# Patient Record
Sex: FEMALE | Race: WHITE | NOT HISPANIC OR LATINO | Employment: UNEMPLOYED | URBAN - METROPOLITAN AREA
[De-identification: names, ages, dates, MRNs, and addresses within clinical notes are randomized per-mention and may not be internally consistent; named-entity substitution may affect disease eponyms.]

---

## 2017-01-01 LAB — NEGATIVE: NORMAL

## 2017-10-15 ENCOUNTER — TRANSFERRED RECORDS (OUTPATIENT)
Dept: HEALTH INFORMATION MANAGEMENT | Facility: CLINIC | Age: 52
End: 2017-10-15

## 2018-03-06 ENCOUNTER — OFFICE VISIT (OUTPATIENT)
Dept: OTHER | Facility: OUTPATIENT CENTER | Age: 53
End: 2018-03-06

## 2018-03-06 DIAGNOSIS — Z51.89 AFTER CARE: Primary | ICD-10-CM

## 2018-03-06 NOTE — MR AVS SNAPSHOT
After Visit Summary   3/6/2018    Jocelin Hernandez    MRN: [id number]           Patient Information     Date Of Birth          1965        Visit Information        Provider Department      3/6/2018 4:00 PM [facility] Sexual [facility]        Today's Diagnoses     After care    -  1       Follow-ups after your visit        Your next 10 appointments already scheduled     Apr 03, 2018  4:00 PM CDT   GROUP with [facility] for [facility] ([facility])    [address]  Mail Code 7521  [facility] [address]   [phone]            Apr 17, 2018  4:00 PM CDT   GROUP with [facility] ([facility])    [address]  Mail Code 7521  [facility] [address]   [phone]            May 01, 2018  4:00 PM CDT   GROUP with [facility] ([facility])    [address]  Mail Code 7521  [facility] [address]   [phone]            May 15, 2018  4:00 PM CDT   GROUP with [facility] ([facility])    [address]  Mail Code 7521  [facility] [address]   [phone]              Who to contact     Please call your clinic at [phone] to:    Ask questions about your health    Make or cancel appointments    Discuss your medicines    Learn about your test results    Speak to your doctor            Additional Information About Your Visit        MyChart Information     BATS is an electronic gateway that provides easy, online access to your medical records. With BATS, you can request a clinic appointment, read your test results, renew a prescription or communicate with your care team.     To sign up for Cloudamizet visit the website at [email or web address]   You will be asked to enter the access code listed below, as well as some personal information. Please follow the directions to create your username  and password.     Your access code is: [email or web address]  Expires: 2018  1:57 PM     Your access code will  in 90 days. If you need help or a new code, please contact your [facility] or call [phone] for assistance.        Care EveryWhere ID     This is your Care EveryWhere ID. This could be used by other organizations to access your [place] medical records  [id number]         Blood Pressure from Last 3 Encounters:   No data found for BP    Weight from Last 3 Encounters:   No data found for Wt              We Performed the Following     Support Group - 120 Min. [90886.539]        Primary Care Provider    None Specified       No primary provider on file.        Equal Access to Services     CHARLES AMOR : Leticia Pradhan, amelia awad, zhen lundberg, soledad serrano . So [facility] [phone].    ATENCIÓN: Si habla español, tiene a lantigua disposición servicios gratuitos de asistencia lingüística. Llame al [phone].    We comply with applicable federal civil rights laws and [place] laws. We do not discriminate on the basis of race, color, national origin, age, disability, sex, sexual orientation, or gender identity.            Thank you!     Thank you for choosing [place] [facility]  for your care. Our goal is always to provide you with excellent care. Hearing back from our patients is one way we can continue to improve our services. Please take a few minutes to complete the written survey that you may receive in the mail after your visit with us. Thank you!             Your Updated Medication List - Protect others around you: Learn how to safely use, store and throw away your medicines at www.disposemymeds.org.      Notice  As of 3/6/2018 11:59 PM    You have not been prescribed any medications.

## 2018-03-20 ENCOUNTER — OFFICE VISIT (OUTPATIENT)
Dept: OTHER | Facility: OUTPATIENT CENTER | Age: 53
End: 2018-03-20

## 2018-03-20 DIAGNOSIS — Z51.89 AFTER CARE: Primary | ICD-10-CM

## 2018-03-20 NOTE — PROGRESS NOTES
Program in Human Sexuality  Center [facility]  [address], [address]  [place]  [address]    Group Progress Note    Date of Service: Mar 6, 2018  Client Name: Jocelin Hernandez  YOB: 1965   MRN: [id number]   Number of Minutes: 120  Therapist(s): Kisha Infante and Cathy Davis, PhD, LP     Current Symptoms/Status:  Distress related to partner's struggles with CSB     Progress Toward Treatment Goals:  Attending first psychoeducational group     Intervention - Modality and Description:  Supportive and Psychoeducational Techniques designed to increase level of support, improve self-esteem and empower client to make decisions that are in their best interest given the current relational dynamics.   Client participated in a ice-breaker activity where able to slowly introduce self to others.  Participated in a discussion of the importance of confidentiality and gave ideas about the way that the group could run so that it would feel emotionally safe.  Client shared aspects of personal narrative that would help the group get to know her struggles being in a relationship with someone who has CSB as well as serve as a shame-reduction mechanism.        Response to Intervention:  Client was engaged throughout the group process.      Assignment:  Materials for 2nd group     Interactive Complexity:  none     Diagnosis:  Z51.89 After care        Plan/Need for Future Services:  Return for group therapy in 2 weeks.     Cathy Davis, PhD LP  /Supervisor

## 2018-03-20 NOTE — MR AVS SNAPSHOT
After Visit Summary   3/20/2018    Jocelin Hernandez    MRN: [id number]           Patient Information     Date Of Birth          1965        Visit Information        Provider Department      3/20/2018 4:00 PM [facility]        Today's Diagnoses     After care    -  1       Follow-ups after your visit        Your next 10 appointments already scheduled     Apr 03, 2018  4:00 PM CDT   GROUP with [facility] for Sexual [facility] ([facility])    [address]  Mail Code 7521  [facility] [address]   [phone]            Apr 17, 2018  4:00 PM CDT   GROUP with [facility] ([facility])    [address]  Mail [address] 7521  [facility] [address]   [phone]            May 01, 2018  4:00 PM CDT   GROUP with [facility] ([facility])    [address]  Mail [address] 7521  [facility] [address]   [phone]            May 15, 2018  4:00 PM CDT   GROUP with [facility] ([facility])    [address]  Mail [address] 7521  [facility] [address]   [phone]              Who to contact     Please call your clinic at [phone] to:    Ask questions about your health    Make or cancel appointments    Discuss your medicines    Learn about your test results    Speak to your doctor            Additional Information About Your Visit        MyChart Information     SmartDocs (Teknowmics)t is an electronic gateway that provides easy, online access to your medical records. With Voxel, you can request a clinic appointment, read your test results, renew a prescription or communicate with your care team.     To sign up for SmartDocs (Teknowmics)t visit the website at [email or web address]   You will be asked to enter the access code listed below, as well as some personal information. Please follow the directions to create your  username and password.     Your access code is: [email or web address]  Expires: 2018  1:57 PM     Your access code will  in 90 days. If you need help or a new code, please contact your [facility] or call [phone] for assistance.        Care EveryWhere ID     This is your Care EveryWhere ID. This could be used by other organizations to access your [place] medical records  [id number]         Blood Pressure from Last 3 Encounters:   No data found for BP    Weight from Last 3 Encounters:   No data found for Wt              We Performed the Following     Support Group - 120 Min. [30188.539]        Primary Care Provider    None Specified       No primary provider on file.        Equal Access to Services     CHARLES AMOR : Leticia Pradhan, amelia awad, zhen lundberg, soledad serrano . So [facility] [phone].    ATENCIÓN: Si habla español, tiene a lantigua disposición servicios gratuitos de asistencia lingüística. Llame al [phone].    We comply with applicable federal civil rights laws and [place] laws. We do not discriminate on the basis of race, color, national origin, age, disability, sex, sexual orientation, or gender identity.            Thank you!     Thank you for choosing [place] [facility]  for your care. Our goal is always to provide you with excellent care. Hearing back from our patients is one way we can continue to improve our services. Please take a few minutes to complete the written survey that you may receive in the mail after your visit with us. Thank you!             Your Updated Medication List - Protect others around you: Learn how to safely use, store and throw away your medicines at www.disposemymeds.org.      Notice  As of 3/20/2018 11:59 PM    You have not been prescribed any medications.

## 2018-03-21 NOTE — PROGRESS NOTES
Program in Human Sexuality  Center for [facility]  [address], [address]  [place]  [address]    Group Progress Note    Date of Service: Mar 6, 2018  Client Name: Jocelin Hernandez  YOB: 1965   MRN: [id number]   Number of Minutes: 120  Therapist(s): Kisha Infante and Cathy Davis, PhD, LP      Current Symptoms/Status:  Distress related to partner's struggles with CSB      Progress Toward Treatment Goals:  Attending 2nd psychoeducational group      Intervention - Modality and Description:  Supportive and Psychoeducational Techniques designed to increase level of support, improve self-esteem and empower client to make decisions that are in their best interest given the current relational dynamics.   Client introduced self to new members who were joining by sharing aspects of personal narrative.  Participated in a discussion of treatment model for CSB at this clinic and how this is similar and different from other types of treatment (e.g. sex addiction models of recovery).       Response to Intervention:  Client was engaged throughout the group process.       Assignment:  Reflect on what she knows about partner's CSB treatment and how she fits into it.       Interactive Complexity:  none      Diagnosis:  Z51.89 After care          Plan/Need for Future Services:  Return for group therapy in 2 weeks.      Cathy Davis, PhD LP  /Supervisor

## 2018-04-03 ENCOUNTER — OFFICE VISIT (OUTPATIENT)
Dept: OTHER | Facility: OUTPATIENT CENTER | Age: 53
End: 2018-04-03

## 2018-04-03 DIAGNOSIS — Z51.89 AFTER CARE: Primary | ICD-10-CM

## 2018-04-03 NOTE — MR AVS SNAPSHOT
After Visit Summary   4/3/2018    Jocelin Hernandez    MRN: [id number]           Patient Information     Date Of Birth          1965        Visit Information        Provider Department      4/3/2018 4:00 PM [facility]        Today's Diagnoses     After care    -  1       Follow-ups after your visit        Your next 10 appointments already scheduled     May 01, 2018  4:00 PM CDT   GROUP with [facility] Health ([facility])    [address]  Mail [address] 7521  [facility] [address]   [phone]            May 15, 2018  4:00 PM CDT   GROUP with [facility] ([facility])    [address]  Mail Code 7521  [facility] [address]   [phone]              Who to contact     Please call your clinic at [phone] to:    Ask questions about your health    Make or cancel appointments    Discuss your medicines    Learn about your test results    Speak to your doctor            Additional Information About Your Visit        MyChart Information     [employer] is an electronic gateway that provides easy, online access to your medical records. With [employer] Medical, you can request a clinic appointment, read your test results, renew a prescription or communicate with your care team.     To sign up for [employer] Medical visit the website at [email or web address]   You will be asked to enter the access code listed below, as well as some personal information. Please follow the directions to create your username and password.     Your access code is: [email or web address]  Expires: 2018  1:57 PM     Your access code will  in 90 days. If you need help or a new code, please contact your [facility] or call [phone] for assistance.        Care EveryWhere ID     This is your Care EveryWhere ID. This could be used by other organizations to access your [facility]  records  [id number]         Blood Pressure from Last 3 Encounters:   No data found for BP    Weight from Last 3 Encounters:   No data found for Wt              We Performed the Following     Group Therapy [68232]        Primary Care Provider    None Specified       No primary provider on file.        Equal Access to Services     CHARLES AMOR : Hadii georgia mitchell darlin Soerasmo, washadiada luqadaha, redta kaalmada ademonique, soledad harmeet myeshaainsley menesesyaquelin cannon maggie . So [facility] [phone].    ATENCIÓN: Si habla español, tiene a lantigua disposición servicios gratuitos de asistencia lingüística. Llame al [phone].    We comply with applicable federal civil rights laws and [place] laws. We do not discriminate on the basis of race, color, national origin, age, disability, sex, sexual orientation, or gender identity.            Thank you!     Thank you for choosing [place] [facility]  for your care. Our goal is always to provide you with excellent care. Hearing back from our patients is one way we can continue to improve our services. Please take a few minutes to complete the written survey that you may receive in the mail after your visit with us. Thank you!             Your Updated Medication List - Protect others around you: Learn how to safely use, store and throw away your medicines at www.disposemymeds.org.      Notice  As of 4/3/2018 11:59 PM    You have not been prescribed any medications.

## 2018-04-16 NOTE — PROGRESS NOTES
Program in Human Sexuality  [facility]  [address], [address]  [place]  [address]    Group Progress Note    Date of Service: 4/03/18  Client Name: Jocelin Hernandez  YOB: 1965   MRN: [id number]   Number of Minutes: 120  Therapist(s): Amanda Pablo PsyD LMSANTIAGO and Cathy Davis, PhD, LP      Current Symptoms/Status:  Distress related to partner's struggles with CSB      Progress Toward Treatment Goals:  Attending 3rd psychoeducational group      Intervention - Modality and Description:  Supportive and Psychoeducational Techniques designed to increase level of support, improve self-esteem and empower client to make decisions that are in their best interest given the current relational dynamics.   Client checked in and shared questions about treatment model.  Participated in a discussion of treatment model for CSB, and conversation about boundaries.     Response to Intervention:  Client was engaged throughout the group process.       Assignment:  Reflect on what kinds of boundaries she would like to set for herself.       Interactive Complexity:  none      Diagnosis:  Z51.89 After care          Plan/Need for Future Services:  Return for group therapy in 2 weeks.      Amanda Pablo PsyD  Postdoctoral Fellow

## 2018-04-17 ENCOUNTER — TELEPHONE (OUTPATIENT)
Dept: OTHER | Facility: OUTPATIENT CENTER | Age: 53
End: 2018-04-17

## 2018-04-17 NOTE — PROGRESS NOTES
I was present for the entire group therapy session and actively participated in the session.  Cathy Davis, Ph.D.   Licensed Psychologist

## 2018-05-15 ENCOUNTER — HEALTH MAINTENANCE LETTER (OUTPATIENT)
Age: 53
End: 2018-05-15

## 2018-05-15 ENCOUNTER — OFFICE VISIT (OUTPATIENT)
Dept: OTHER | Facility: OUTPATIENT CENTER | Age: 53
End: 2018-05-15

## 2018-05-15 DIAGNOSIS — Z51.89 AFTER CARE: Primary | ICD-10-CM

## 2018-05-15 NOTE — MR AVS SNAPSHOT
After Visit Summary   5/15/2018    Jocelin Hernandez    MRN: [id number]           Patient Information     Date Of Birth          1965        Visit Information        Provider Department      5/15/2018 4:00 PM [facility]        Today's Diagnoses     After care    -  1       Follow-ups after your visit        Your next 10 appointments already scheduled     May 25, 2018 12:00 PM CDT   Return Walk In Ortho with Efrain Rooney MD   [facility] ([facility])    [address]  4th Floor  [facility] [address] [phone]            Aug 10, 2018  1:00 PM CDT   New Patient Visit with Nola Nielson MD   [facility] ([facility])    [address] [address]   [phone]              Who to contact     Please call your clinic at [phone] to:    Ask questions about your health    Make or cancel appointments    Discuss your medicines    Learn about your test results    Speak to your doctor            Additional Information About Your Visit        AVA [email or web address].[email or web address] Information     Context Matters is an electronic gateway that provides easy, online access to your medical records. With Context Matters, you can request a clinic appointment, read your test results, renew a prescription or communicate with your care team.     To sign up for Context Matters visit the website at [email or web address]   You will be asked to enter the access code listed below, as well as some personal information. Please follow the directions to create your username and password.     Your access code is: [email or web address]  Expires: 2018  1:57 PM     Your access code will  in 90 days. If you need help or a new code, please contact your [facility] or call [phone] for assistance.        Care EveryWhere ID     This is your Care EveryWhere  ID. This could be used by other organizations to access your [place] medical records  [id number]         Blood Pressure from Last 3 Encounters:   05/21/18 138/84    Weight from Last 3 Encounters:   05/21/18 77.6 kg (171 lb)              We Performed the Following     Support Group - 120 Min. [80997.539]        Primary Care Provider Office Phone # Fax #    Nola Nielson [phone]       [address] [place] [address]        Equal Access to Services     CHARLES [facility] : Hadii aad ku hadasho Soomaali, waaxda luqadaha, qaybta kaalmada adeegyada, waxay idiin hayaan adeyaquelin serrano . So [facility] [phone].    ATENCIÓN: Si habla español, tiene a lantigua disposición servicios gratuitos de asistencia lingüística. Musa al [phone].    We comply with applicable federal civil rights laws and [place] laws. We do not discriminate on the basis of race, color, national origin, age, disability, sex, sexual orientation, or gender identity.            Thank you!     Thank you for choosing [place] [facility]  for your care. Our goal is always to provide you with excellent care. Hearing back from our patients is one way we can continue to improve our services. Please take a few minutes to complete the written survey that you may receive in the mail after your visit with us. Thank you!             Your Updated Medication List - Protect others around you: Learn how to safely use, store and throw away your medicines at www.disposemymeds.org.      Notice  As of 5/15/2018 11:59 PM    You have not been prescribed any medications.

## 2018-05-21 ENCOUNTER — OFFICE VISIT (OUTPATIENT)
Dept: ORTHOPEDICS | Facility: CLINIC | Age: 53
End: 2018-05-21
Payer: COMMERCIAL

## 2018-05-21 VITALS
DIASTOLIC BLOOD PRESSURE: 84 MMHG | HEART RATE: 75 BPM | SYSTOLIC BLOOD PRESSURE: 138 MMHG | HEIGHT: 65 IN | BODY MASS INDEX: 28.49 KG/M2 | WEIGHT: 171 LBS

## 2018-05-21 DIAGNOSIS — M25.561 ACUTE PAIN OF RIGHT KNEE: Primary | ICD-10-CM

## 2018-05-21 NOTE — PROGRESS NOTES
"[facility] Walk-in Clinic Note      Patient is a 53 year old female who presents to the office today for: Left knee pain  Roughly 6 months ago the patient began having pain in the medial left knee.  No specific injury or trauma but it occurred while walking.  Was seen and evaluated at [facility] and ultimately diagnosed with meniscus tear.  Underwent arthroscopic surgery, but has had persistent pain in the medial knee since that time.  Also has persistent effusion as well as popliteal cyst.  Has tried physical therapy briefly at [facility] but was unhappy with the treatment and found it difficult to participate because of pain and swelling.  Currently she continues to have swelling.  Pain is worse with deep flexion, stairs, kneeling.  Also has aching pain with walking.  Pain localizes medially and and posterior knee.    Denies weakness, numbness, tingling, clicking, locking, or catching.      Past Medical History, Current Medications, and Allergies are reviewed in the electronic medical record as appropriate.     ROS: Pertinent items are noted in HPI.  Constitutional: negative for fevers, chills and malaise  Cardiovascular: negative for dyspnea, fatigue, lower extremity edema  Integument/breast: negative for rash, skin lesion(s) and skin color change  Neurological: negative for paresthesia and weakness      EXAM:/84  Pulse 75  Ht 5' 5.25\" (1.657 m)  Wt 171 lb (77.6 kg)  BMI 28.24 kg/m2    Patient is alert, No acute distress, pleasant and conversational.    Gait: nonantalgic. Normal heel toe gait.    Patient is able to perform two legged squat with medial left knee at roughly 75  flexion.    left knee:   Skin intact. No erythema or ecchymosis.  Mild to moderate effusion.  No soft tissue swelling.  Popliteal cyst present    AROM: Zero to approximately 135  without restriction but pain on forced full flexion    Palpation:   TTP over medial joint line  No medial or lateral facet joint tenderness.  No " lateral joint line tenderness     Special Tests:  Negative bounce test, + Roldan's.  No ligamentous laxity or pain with valgus or varus stress.  Negative Lachman's, Anterior Drawer and Posterior Drawer     Full Isometric quad strength, extensor mechanism in place     Neurovascularly intact in the lower extremity    Hip and Ankle with full AROM and nontender      Imaging:   No previous imaging available for review.  However,  radiology reports are available and reviewed.  MRI left knee dated 10/15/17:  IMPRESSION:   1. Large complex primarily horizontal tear throughout the medial meniscus.  2. Mild tearing of the anterior horn of the lateral meniscus with adjacent 2.5 cm parameniscal cyst.   3. Full-thickness chondral fissure in the medial patellar facet.  4. Small joint effusion. Moderate popliteal cyst.    X-ray left knee dated 10/12/2017:  Left knee, 3 views.   Indication:  Left knee pain.  Assessment:  No obvious fracture, subluxation or dislocation noted.     Assessment: Patient is a 53 year old female with persistent left knee pain after arthroscopy.  While her pain continues to localize medially, she may be having persistent effusion from chondromalacia seen on MRI as well.  No mechanical symptoms at the current time.    Recommendations:   Reviewed imaging and previous records with the patient in detail.  Discussed options at this point and patient would like to pursue therapeutic aspiration as well as corticosteroid injection.  She will make an appointment to follow-up and have this done.  Also plan to pursue dedicated course of physical therapy.  If ineffective, will consider repeat MRI and/or surgical consultation.    Efrain Rooney MD

## 2018-05-21 NOTE — MR AVS SNAPSHOT
After Visit Summary   2018    Jocelin Hernandez    MRN: [id number]           Patient Information     Date Of Birth          1965        Visit Information        Provider Department      2018 12:00 PM Efrain Rooney MD  [facility]        Today's Diagnoses     Acute pain of right knee    -  1       Follow-ups after your visit        Additional Services     RADHA PT, HAND, AND CHIROPRACTIC REFERRAL       Physical Therapy Referral                  Your next 10 appointments already scheduled     May 25, 2018 12:00 PM CDT   Return Walk In Ortho with MD FLAKO Chowdary [facility] ([facility] [place])    [address]  4th Floor  [facility] [id number] [phone]            Aug 10, 2018  1:00 PM CDT   New Patient Visit with Nola Nielson MD   [facility] ([facility])    [address], [facility] [address]  [facility] [address]   [phone]              Who to contact     Please call your clinic at [phone] to:    Ask questions about your health    Make or cancel appointments    Discuss your medicines    Learn about your test results    Speak to your doctor            Additional Information About Your Visit        MyChart Information     Plurchasehart is an electronic gateway that provides easy, online access to your medical records. With [employer], you can request a clinic appointment, read your test results, renew a prescription or communicate with your care team.     To sign up for [email or web address] visit the website at [email or web address]   You will be asked to enter the access code listed below, as well as some personal information. Please follow the directions to create your username and password.     Your access code is: [email or web address]  Expires: 2018  1:57 PM     Your access code will  in 90 days. If you need help or a new code,  "please contact your [facility] or call [phone] for assistance.        Care EveryWhere ID     This is your Care EveryWhere ID. This could be used by other organizations to access your [facility] medical records  [id number]        Your Vitals Were     Pulse Height BMI (Body Mass Index)             75 5' 5.25\" (1.657 m) 28.24 kg/m2          Blood Pressure from Last 3 Encounters:   05/21/18 138/84    Weight from Last 3 Encounters:   05/21/18 171 lb (77.6 kg)              We Performed the Following     RADHA PT, HAND, AND CHIROPRACTIC REFERRAL        Primary Care Provider Office Phone # Fax #    Nola Nielson [phone]       [address] [place] [address]        Equal Access to Services     [facility] : Hadii georgia mitchell hadasho Soomaali, waaxda luqadaha, qaybta kaalmada adeegyada, soledad ga haycathy serrano . So [facility] [place] [facility] [phone].    ATENCIÓN: Si habla español, tiene a lantigua disposición servicios gratuitos de asistencia lingüística. Musa al [phone].    We comply with applicable federal civil rights laws and [place] laws. We do not discriminate on the basis of race, color, national origin, age, disability, sex, sexual orientation, or gender identity.            Thank you!     Thank you for choosing [facility] CLINIC  for your care. Our goal is always to provide you with excellent care. Hearing back from our patients is one way we can continue to improve our services. Please take a few minutes to complete the written survey that you may receive in the mail after your visit with us. Thank you!             Your Updated Medication List - Protect others around you: Learn how to safely use, store and throw away your medicines at www.disposemymeds.org.          This list is accurate as of 5/21/18 11:59 PM.  Always use your most recent med list.                   Brand Name Dispense Instructions for use Diagnosis    " acetaminophen 500 MG tablet    TYLENOL     Take 1,000 mg by mouth        albuterol 108 (90 Base) MCG/ACT Inhaler    PROAIR HFA/PROVENTIL HFA/VENTOLIN HFA     Inhale 2 puffs into the lungs        atenolol 25 MG tablet    TENORMIN     Take 25 mg by mouth        buPROPion 150 MG 24 hr tablet    WELLBUTRIN XL     TK 1 T PO QAM

## 2018-05-22 PROBLEM — L71.9 ROSACEA: Status: ACTIVE | Noted: 2018-05-22

## 2018-05-22 PROBLEM — I10 HTN (HYPERTENSION): Status: ACTIVE | Noted: 2018-05-22

## 2018-05-22 RX ORDER — ALBUTEROL SULFATE 90 UG/1
2 AEROSOL, METERED RESPIRATORY (INHALATION)
COMMUNITY
Start: 2018-04-22 | End: 2018-09-12

## 2018-05-22 RX ORDER — BUPROPION HYDROCHLORIDE 150 MG/1
TABLET ORAL
COMMUNITY
Start: 2017-08-24 | End: 2018-09-12

## 2018-05-22 RX ORDER — ATENOLOL 25 MG/1
25 TABLET ORAL
COMMUNITY
Start: 2018-04-23 | End: 2018-09-12

## 2018-05-22 RX ORDER — ACETAMINOPHEN 500 MG
1000 TABLET ORAL
COMMUNITY

## 2018-05-25 ENCOUNTER — OFFICE VISIT (OUTPATIENT)
Dept: ORTHOPEDICS | Facility: CLINIC | Age: 53
End: 2018-05-25
Payer: COMMERCIAL

## 2018-05-25 VITALS — WEIGHT: 171 LBS | BODY MASS INDEX: 28.49 KG/M2 | HEIGHT: 65 IN | HEART RATE: 78 BPM

## 2018-05-25 DIAGNOSIS — G89.29 CHRONIC PAIN OF LEFT KNEE: Primary | ICD-10-CM

## 2018-05-25 DIAGNOSIS — M25.562 CHRONIC PAIN OF LEFT KNEE: Primary | ICD-10-CM

## 2018-05-25 NOTE — PROGRESS NOTES
Program in [facility]  [facility]  [address], [address]  [place]  [address]    Group Progress Note    Date of Service: 5/15/18  Client Name: Jocelin Hernandez  YOB: 1965   MRN: [id number]   Number of Minutes: 120  Therapist(s): Kisha Infante (absent due to illness) and Cathy Davis, PhD, LP     Current Symptoms/Status:  Distress related to partner's struggles with CSB     Progress Toward Treatment Goals:  Attending psychoeducational group     Intervention - Modality and Description:  Supportive and Psychoeducational Techniques designed to increase level of support, improve self-esteem and empower client to make decisions that are in their best interest given the current relational dynamics.   Client participated in a review of boundary setting and the implications of setting boundaries in group member's different R dynamics.  Strongly recommended that client put boundaries in place that are doable when feeling unsafe.  Began discussion of own willingness to be vulnerable and increase levels of intimacy across Rs including with spouse.      Response to Intervention:  Client was engaged throughout the group process.      Assignment:  Review question of willingness to be vulnerable.      Interactive Complexity:  none     Diagnosis:  Z51.89 After care        Plan/Need for Future Services:  Return for group in 2 weeks.      Cathy Davis, PhD LP

## 2018-05-25 NOTE — MR AVS SNAPSHOT
"              After Visit Summary   5/25/2018    Jocelin Hernandez    MRN: [id number]           Patient Information     Date Of Birth          1965        Visit Information        Provider Department      5/25/2018 12:00 PM Efrain Rooney MD [facility]        Today's Diagnoses     Chronic pain of left knee    -  1       Follow-ups after your visit        Your next 10 appointments already scheduled     Jun 05, 2018  4:00 PM CDT   GROUP with [facility]   [facility] ([facility])    [address]  [address]  [facility] [address]   [phone]            Aug 10, 2018  1:00 PM CDT   New Patient Visit with Nola Nielson MD   [facility] ([facility])    [facility]  [address]  [facility] [address]   [phone]              Who to contact     Please call your clinic at [phone] to:    Ask questions about your health    Make or cancel appointments    Discuss your medicines    Learn about your test results    Speak to your doctor            Additional Information About Your Visit        Care EveryWhere ID     This is your Care EveryWhere ID. This could be used by other organizations to access your [facility] medical records  [id number]        Your Vitals Were     Pulse Height BMI (Body Mass Index)             78 5' 5\" (1.651 m) 28.46 kg/m2          Blood Pressure from Last 3 Encounters:   05/21/18 138/84    Weight from Last 3 Encounters:   05/25/18 171 lb (77.6 kg)   05/21/18 171 lb (77.6 kg)              We Performed the Following     DRAIN/INJECT LARGE JOINT/BURSA        Primary Care Provider Office Phone # Fax #    Nola Nielson [phone]       [address]  [facility] [address]        Equal Access to Services     CHARLES AMOR AH: Leticia Pradhan, amelia awad, zhen lundberg, soledad cannon " lanestor bowers. So [facility] [phone].    ATENCIÓN: Si habla bety, tiene a lantgiua disposición servicios gratuitos de asistencia lingüística. Musa al [phone].    We comply with applicable federal civil rights laws and [place] laws. We do not discriminate on the basis of race, color, national origin, age, disability, sex, sexual orientation, or gender identity.            Thank you!     Thank you for choosing [facility] OR[facility] CLINIC  for your care. Our goal is always to provide you with excellent care. Hearing back from our patients is one way we can continue to improve our services. Please take a few minutes to complete the written survey that you may receive in the mail after your visit with us. Thank you!             Your Updated Medication List - Protect others around you: Learn how to safely use, store and throw away your medicines at www.disposemymeds.org.          This list is accurate as of 5/25/18 11:59 PM.  Always use your most recent med list.                   Brand Name Dispense Instructions for use Diagnosis    acetaminophen 500 MG tablet    TYLENOL     Take 1,000 mg by mouth        albuterol 108 (90 Base) MCG/ACT Inhaler    PROAIR HFA/PROVENTIL HFA/VENTOLIN HFA     Inhale 2 puffs into the lungs        atenolol 25 MG tablet    TENORMIN     Take 25 mg by mouth        buPROPion 150 MG 24 hr tablet    WELLBUTRIN XL     TK 1 T PO QAM

## 2018-05-25 NOTE — NURSING NOTE
[address] [address]  Dept: [phone]  ______________________________________________________________________________    Patient: Jocelin Hernandez   : 1965   MRN: [id number]   May 25, 2018    INVASIVE PROCEDURE SAFETY CHECKLIST    Date: 18   Procedure: Left knee CSI and aspiration   Patient Name: Jocelin Hernandez  MRN: [id number]  YOB: 1965    Action: Complete sections as appropriate. Any discrepancy results in a HARD COPY until resolved.     PRE PROCEDURE:  Patient ID verified with 2 identifiers (name and  or MRN): Yes  Procedure and site verified with patient/designee (when able): Yes  Accurate consent documentation in medical record: Yes  H&P (or appropriate assessment) documented in medical record: Yes  H&P must be up to 20 days prior to procedure and updates within 24 hours of procedure as applicable: Yes  Relevant diagnostic and radiology test results appropriately labeled and displayed as applicable: Yes  Procedure site(s) marked with provider initials: Yes    TIMEOUT:  Time-Out performed immediately prior to starting procedure, including verbal and active participation of all team members addressing the following:Yes  * Correct patient identify  * Confirmed that the correct side and site are marked  * An accurate procedure consent form  * Agreement on the procedure to be done  * Correct patient position  * Relevant images and results are properly labeled and appropriately displayed  * The need to administer antibiotics or fluids for irrigation purposes during the procedure as applicable   * Safety precautions based on patient history or medication use    DURING PROCEDURE: Verification of correct person, site, and procedures any time the responsibility for care of the patient is transferred to another member of the care team.     The following medication was given:     MEDICATION:  Kenalog 40 mg  ROUTE: IA  SITE:  Left knee  DOSE: 1mL  LOT #: [id number]  : Keegy  EXPIRATION DATE: 01/2020  NDC#: [address]-7   Was there drug waste? No    MEDICATION:  Lidocaine without epinephrine  ROUTE: IA  SITE: Left knee  DOSE: 9mL  LOT #: [id number] (2vials)  : flaveit  EXPIRATION DATE: 02/2022  NDC#: 10450-455-12   Was there drug waste? Yes  Amount of drug waste (mL): 1.  Reason for waste:  Single use vial    Christen Lim, ATC  May 25, 2018

## 2018-05-25 NOTE — PROGRESS NOTES
SPORTS & ORTHOPEDIC WALK-IN FOLLOW-UP VISIT 5/25/2018    Interval History:     Follow up reason: L knee aspiration/CSI    Date of injury: no injury, pain since knee scope 6 months ago    Date last seen: 5/21/18    Following Therapeutic Plan: Yes     Pain: Unchanged    Function: Unchanged    Interval History: NA     Medical History:    Any recent changes to your medical history? No    Any new medication prescribed since last visit? No

## 2018-05-29 ENCOUNTER — OFFICE VISIT (OUTPATIENT)
Dept: OTHER | Facility: OUTPATIENT CENTER | Age: 53
End: 2018-05-29

## 2018-05-29 DIAGNOSIS — Z51.89 AFTER CARE: Primary | ICD-10-CM

## 2018-05-29 NOTE — MR AVS SNAPSHOT
After Visit Summary   5/29/2018    Jocelin Hernandez    MRN: [id number]           Patient Information     Date Of Birth          1965        Visit Information        Provider Department      5/29/2018 4:00 PM [facility] Sexual Health        Today's Diagnoses     After care    -  1       Follow-ups after your visit        Your next 10 appointments already scheduled     Jun 28, 2018  8:20 AM CDT   RADHA Extremity with Jocelin Simmons PT   [facility] Physical Therapy RADHA ([facility])    [address] [place] [address] [address] [facility] [address] [phone]            Jul 17, 2018  4:00 PM CDT   GROUP with [facility] Sexual Health ([facility])    [address]  [address] 7521  [facility] [address]   [phone]            Aug 10, 2018  1:00 PM CDT   New Patient Visit with Nola Nielson MD   [facility] ([facility])    [facility]  [address]  [facility] [address]   [phone]            Aug 21, 2018  4:00 PM CDT   GROUP with [facility] Sexual Health ([facility])    [address]  [address]  [facility] [address]   [phone]              Who to contact     Please call your clinic at [phone] to:    Ask questions about your health    Make or cancel appointments    Discuss your medicines    Learn about your test results    Speak to your doctor            Additional Information About Your Visit        Care EveryWhere ID     This is your Care EveryWhere ID. This could be used by other organizations to access your [facility] medical records  [id number]         Blood Pressure from Last 3 Encounters:   05/21/18 138/84    Weight from Last 3 Encounters:   05/25/18 77.6 kg (171 lb)   05/21/18 77.6 kg (171 lb)              We Performed the Following     Support Group - 120 Min. [[id number]]        Primary Care Provider  Office Phone # Fax #    Nola Nielson [phone]       [address] [facility] [address]  [facility] [address]        Equal Access to Services     CHARLES AMOR : Hadii aad ku hadvanialisa Abbiali, washadiada luqadaha, qaiggyta kalara lundberg, soledad quiles gideonyaquelin cannon maggie bowers. So [facility] [phone].    ATENCIÓN: Si habla español, tiene a lantigua disposición servicios gratuitos de asistencia lingüística. Llame al [phone].    We comply with applicable federal civil rights laws and [place] laws. We do not discriminate on the basis of race, color, national origin, age, disability, sex, sexual orientation, or gender identity.            Thank you!     Thank you for choosing [place] [facility]  for your care. Our goal is always to provide you with excellent care. Hearing back from our patients is one way we can continue to improve our services. Please take a few minutes to complete the written survey that you may receive in the mail after your visit with us. Thank you!             Your Updated Medication List - Protect others around you: Learn how to safely use, store and throw away your medicines at www.disposemymeds.org.          This list is accurate as of 5/29/18 11:59 PM.  Always use your most recent med list.                   Brand Name Dispense Instructions for use Diagnosis    acetaminophen 500 MG tablet    TYLENOL     Take 1,000 mg by mouth        albuterol 108 (90 Base) MCG/ACT Inhaler    PROAIR HFA/PROVENTIL HFA/VENTOLIN HFA     Inhale 2 puffs into the lungs        atenolol 25 MG tablet    TENORMIN     Take 25 mg by mouth        buPROPion 150 MG 24 hr tablet    WELLBUTRIN XL     TK 1 T PO QAM

## 2018-05-30 NOTE — PROGRESS NOTES
"[facility] Sports and Orthopedic Walk-in Clinic Note      Patient is a 53 year old female who presents to the office today for left knee aspiration and injection. Seen previously on  5/21/18 and discussed steroid injection and physical therapy because of persistent pain postoperatively from menisectomy 6 months ago.     ROS: Pertinent items are noted in HPI.        EXAM:Pulse 78  Ht 5' 5\" (1.651 m)  Wt 171 lb (77.6 kg)  BMI 28.46 kg/m2    General: alert, pleasant, no distress    Imaging: none new     Assessment: Patient is a 53 year old female with persistent knee pain and stiffness following menisectomy.     Recommendations:   Steroid injection given, see note below.   Recommended course of physical therapy  If no improvement after 6 weeks, will consider repeat MRI and/or follow up with surgeon.     PROCEDURE: left knee aspiration/injection   The patient was apprised of the risks and the benefits of the procedure written consent was signed by the patient.   Landmarks were located on the anterior knee, superior lateral to the patella and the area was marked and cleaned with chlorhexadine swab.   Using no touch technique, the skin was anesthetized with 3mL 1% lidocaine. A 20g needle was introduced easily into the joint space resulting in the aspiration of 30 ML clear yellowish fluid. The using sterile technique, the syringe was removed and exchanged. 40 mg of triamcinolone along with 4 mL of 1% lidocaine  was injected easily into the joint space.   There were no complications. The patient tolerated the procedure well. There was minimal bleeding.   The patient was instructed to ice the knee upon leaving clinic and refrain from overuse over the next 2 days.   The patient was instructed to go to the emergency room with any unusual pain, swelling, or redness occurred in the injected area.           Efrain Rooney MD          "

## 2018-06-01 ENCOUNTER — TRANSFERRED RECORDS (OUTPATIENT)
Dept: HEALTH INFORMATION MANAGEMENT | Facility: CLINIC | Age: 53
End: 2018-06-01

## 2018-06-01 ENCOUNTER — MEDICAL CORRESPONDENCE (OUTPATIENT)
Dept: HEALTH INFORMATION MANAGEMENT | Facility: CLINIC | Age: 53
End: 2018-06-01

## 2018-06-01 LAB — PAP SMEAR - HIM PATIENT REPORTED: NEGATIVE

## 2018-06-03 ENCOUNTER — MEDICAL CORRESPONDENCE (OUTPATIENT)
Dept: HEALTH INFORMATION MANAGEMENT | Facility: CLINIC | Age: 53
End: 2018-06-03

## 2018-06-05 ENCOUNTER — OFFICE VISIT (OUTPATIENT)
Dept: OTHER | Facility: OUTPATIENT CENTER | Age: 53
End: 2018-06-05

## 2018-06-05 DIAGNOSIS — Z51.89 AFTER CARE: Primary | ICD-10-CM

## 2018-06-05 NOTE — MR AVS SNAPSHOT
After Visit Summary   6/5/2018    Jocelin Hernandez    MRN: [id number]           Patient Information     Date Of Birth          1965        Visit Information        Provider Department      6/5/2018 4:00 PM [facility] Sexual Health        Today's Diagnoses     After care    -  1       Follow-ups after your visit        Your next 10 appointments already scheduled     Jun 19, 2018  4:00 PM CDT   GROUP with [facility] Sexual Health ([facility])    [address]  [address]  [facility] [address]   [phone]            Jun 28, 2018  8:20 AM CDT   RADHA Extremity with Jocelin Simmons PT   [facility] Physical Therapy RAD[facility] ([facility])    [address] [place] [address] [address] [facility] [address] [phone]            Jul 17, 2018  4:00 PM CDT   GROUP with [facility] Sexual Health ([facility])    [address]  [address]  [facility] [address]   [phone]            Aug 10, 2018  1:00 PM CDT   New Patient Visit with Nola Nielson MD   [facility] ([facility])    [facility]  [address]  [facility] [address]   [phone]            Aug 21, 2018  4:00 PM CDT   GROUP with [facility] Sexual Health ([facility])    [address]  [address]  [facility] [address]   [phone]              Who to contact     Please call your clinic at [phone] to:    Ask questions about your health    Make or cancel appointments    Discuss your medicines    Learn about your test results    Speak to your doctor            Additional Information About Your Visit        Care EveryWhere ID     This is your Care EveryWhere ID. This could be used by other organizations to access your [facility] medical records  [id number]         Blood Pressure from Last 3 Encounters:   05/21/18 138/84     Weight from Last 3 Encounters:   05/25/18 77.6 kg (171 lb)   05/21/18 77.6 kg (171 lb)              We Performed the Following     Support Group - 120 Min. [[phone]]        Primary Care Provider Office Phone # Fax #    Nola Nielson [phone]       [address] [facility] [address]  [facility] [address]        Equal Access to Services     [facility] : Hadii aad ku hadasho Soomaali, waaxda luqadaha, qaybta kaalmada adeegyada, waxay idiin hayaan adeeg kharash la'aan . So [facility] [phone].    ATENCIÓN: Si rohini albert, tiene a lantigua disposición servicios gratuitos de asistencia lingüística. Llame al [phone].    We comply with applicable federal civil rights laws and [place] laws. We do not discriminate on the basis of race, color, national origin, age, disability, sex, sexual orientation, or gender identity.            Thank you!     Thank you for choosing [facility]  for your care. Our goal is always to provide you with excellent care. Hearing back from our patients is one way we can continue to improve our services. Please take a few minutes to complete the written survey that you may receive in the mail after your visit with us. Thank you!             Your Updated Medication List - Protect others around you: Learn how to safely use, store and throw away your medicines at www.disposemymeds.org.          This list is accurate as of 6/5/18 11:59 PM.  Always use your most recent med list.                   Brand Name Dispense Instructions for use Diagnosis    acetaminophen 500 MG tablet    TYLENOL     Take 1,000 mg by mouth        albuterol 108 (90 Base) MCG/ACT Inhaler    PROAIR HFA/PROVENTIL HFA/VENTOLIN HFA     Inhale 2 puffs into the lungs        atenolol 25 MG tablet    TENORMIN     Take 25 mg by mouth        buPROPion 150 MG 24 hr tablet    WELLBUTRIN XL     TK 1 T PO QAM

## 2018-06-13 ENCOUNTER — THERAPY VISIT (OUTPATIENT)
Dept: PHYSICAL THERAPY | Facility: CLINIC | Age: 53
End: 2018-06-13
Payer: COMMERCIAL

## 2018-06-13 DIAGNOSIS — M25.562 LEFT KNEE PAIN, UNSPECIFIED CHRONICITY: Primary | ICD-10-CM

## 2018-06-13 PROCEDURE — 97161 PT EVAL LOW COMPLEX 20 MIN: CPT | Mod: GP | Performed by: PHYSICAL THERAPIST

## 2018-06-13 PROCEDURE — 97530 THERAPEUTIC ACTIVITIES: CPT | Mod: GP | Performed by: PHYSICAL THERAPIST

## 2018-06-13 PROCEDURE — 97110 THERAPEUTIC EXERCISES: CPT | Mod: GP | Performed by: PHYSICAL THERAPIST

## 2018-06-13 ASSESSMENT — ACTIVITIES OF DAILY LIVING (ADL)
LIMPING: THE SYMPTOM AFFECTS MY ACTIVITY MODERATELY
SWELLING: THE SYMPTOM AFFECTS MY ACTIVITY MODERATELY
WALK: ACTIVITY IS MINIMALLY DIFFICULT
HOW_WOULD_YOU_RATE_THE_OVERALL_FUNCTION_OF_YOUR_KNEE_DURING_YOUR_USUAL_DAILY_ACTIVITIES?: ABNORMAL
RISE FROM A CHAIR: ACTIVITY IS MINIMALLY DIFFICULT
STAND: ACTIVITY IS NOT DIFFICULT
WEAKNESS: THE SYMPTOM AFFECTS MY ACTIVITY MODERATELY
SQUAT: ACTIVITY IS FAIRLY DIFFICULT
STIFFNESS: THE SYMPTOM AFFECTS MY ACTIVITY SLIGHTLY
HOW_WOULD_YOU_RATE_THE_CURRENT_FUNCTION_OF_YOUR_KNEE_DURING_YOUR_USUAL_DAILY_ACTIVITIES_ON_A_SCALE_FROM_0_TO_100_WITH_100_BEING_YOUR_LEVEL_OF_KNEE_FUNCTION_PRIOR_TO_YOUR_INJURY_AND_0_BEING_THE_INABILITY_TO_PERFORM_ANY_OF_YOUR_USUAL_DAILY_ACTIVITIES?: 3
KNEE_ACTIVITY_OF_DAILY_LIVING_SCORE: 61.43
KNEE_ACTIVITY_OF_DAILY_LIVING_SUM: 43
KNEEL ON THE FRONT OF YOUR KNEE: ACTIVITY IS FAIRLY DIFFICULT
GO UP STAIRS: ACTIVITY IS SOMEWHAT DIFFICULT
RAW_SCORE: 43
SIT WITH YOUR KNEE BENT: ACTIVITY IS MINIMALLY DIFFICULT
GO DOWN STAIRS: ACTIVITY IS SOMEWHAT DIFFICULT
PAIN: THE SYMPTOM AFFECTS MY ACTIVITY SLIGHTLY
GIVING WAY, BUCKLING OR SHIFTING OF KNEE: I HAVE THE SYMPTOM BUT IT DOES NOT AFFECT MY ACTIVITY

## 2018-06-13 NOTE — PROGRESS NOTES
"KEY PT FINDINGS:  1) Quad girth deficits (2.5cm)  2) Impaired quad activation - co-contraction with gluteals  3) Impaired hip strength    Physical Therapy Initial Evaluation: Subjective History     Injury/Condition Details:  Presenting Complaint Left Knee Pain    Onset Timing/Date November 2017   Mechanism Had meniscus surgery (menisectomy) at [facility] in November. Since surgery has had continued swelling and pain. Had two sessions of PT - did not continue, not happy with her care. Initially hurt her left knee while walking and then stepped off a window sill.     Saw Dr. Rooney and had aspiration of fluid and a cortizone injection. Helped initially but it did not last. The swelling has stayed down except for the cyst in the back (from her dog running into her).     Is out of shape currently because she has not been able to be active.      Symptom Behavior Details    Primary Symptoms Constant symptoms; worsen with activity, pain (Location: Medial aspect of the knee, feeling of fullness posterior knee, also having pain bilateral hipsQuality: Sharp and Aching/Throbbing), stiffness, weakness, swelling, buckling/shifting/giving way, denies mechanical symptoms - catching/locking.    Worst Pain 5/10 (with Stairs)   Symptom Provocators Standing, walking, stairs, crossing left leg over right leg.    Best Pain 2/10    Symptom Relievers Ice, medication, activity medication   Time of day dependent? Worse in evening after activity   Recent symptom change? symptoms improving since injection     Prior Testing/Intervention for current condition:  Prior Tests  x-ray and MRI   Prior Treatment PT , Injections: steroid (yes, helpful) and Surgery(ies): menisectomy     Lifestyle & General Medical History:  Employment Homemaker    Usual physical activities  (within past year) Riding bike, walking, pilates (3x/week)   Orthopaedic history Labral surgery on the left, calf has been 1\" bigger.    Notable medical history See Epic Chart     Lower " Extremity Physical Therapy Examination    Dynamic Movement Screen:  2 leg stance: Mild decrease in arch height bilaterally, fullness of the left knee - fat pad area  2 leg squat: Apprehension to motion, off loading of the left side    1 leg stance: Right: Proprioception deficits; Left: Proprioception deficits,   1 leg squat:   Right: Femoral IR/ADD, increased trunk flexion and anterior knee excursion  Left: Not assessed due to apprehension    Gait: Non antalgic gait pattern    Knee Joint ROM   Hyperextension Extension Flexion   Right 4 deg 0 deg 145 deg   Left 4 deg 0 deg 142 deg             Hip Joint ROM: deferred to next visit    Lower Extremity Muscle Strength (x/5)   Hip IR Hip ER Knee Ext Hip ABD Hip Ext Knee Flex   Right  5-/5 4+/5 5-/5 4+/5 4/5 5-/5   Left 4+/5 4/5 4+/5 4/5 4-/5 5-/5     Basic Muscle Activation:  Transversus Abdominus: Poor core control with DL bridge test and ASLR  Quadriceps: Right: Normal, Left: Depressed intensity, co-contraction with gluteals    Circumferential Thigh Girth (5cm>15cm proximal to suprapatellar border):  Right: 36.5cm > 52.5cm  Left: 38.5cm > 49.5cm    Knee Joint Effusion (Stroke Test Assessment):  Right: 0  Left: 1+    Lower Extremity Flexibility Screen:  Hamstrings (Supine SLR): Right: -; Left: +  Gastroc (Supine Active DF): Right: -; Left: +  Quadriceps (Prone KF): Right: -; Left: +  Hip Flexors (Josse Test): Right: +; Left: +  ITB (Zeinab Test): Right: +; Left: +    Palpation:   Tender to palpation at the following structures: Medial joint line, infrapatellar fat pad  NOT tender to palpation at the following structures: Medial patellar boarder, lateral patellar boarder    Assessment/Plan:  Jocelin presents to physical therapy with complaints of left knee pain. She is 8 months s/p menisectomy and continues to have quad strength deficits and girth deficits which could be contributing to her continued pain and swelling. She would benefit from quadriceps strengthening and  continued movement analysis as well as hip assessment given her history of hip surgery.     Patient is a 53 year old female with left side knee complaints.    Patient has the following significant findings with corresponding treatment plan.                Diagnosis 1:  Left knee pain  Pain -  hot/cold therapy, manual therapy, STS, splint/taping/bracing/orthotics, self management and education  Decreased ROM/flexibility - manual therapy, therapeutic exercise and home program  Decreased strength - therapeutic exercise, therapeutic activities and home program  Impaired balance - neuro re-education, therapeutic activities and home program  Decreased proprioception - neuro re-education, therapeutic activities and home program  Edema - vasopneumatics and cryocuff  Impaired muscle performance - neuro re-education and home program  Decreased function - therapeutic activities and home program    Therapy Evaluation Codes:   1) History comprised of:   Personal factors that impact the plan of care:      Age, Overall behavior pattern and Time since onset of symptoms.    Comorbidity factors that impact the plan of care are:      None.     Medications impacting care: None.  2) Examination of Body Systems comprised of:   Body structures and functions that impact the plan of care:      Hip and Knee.   Activity limitations that impact the plan of care are:      Sports, Squatting/kneeling, Stairs, Standing and Walking.  3) Clinical presentation characteristics are:   Stable/Uncomplicated.  4) Decision-Making    Low complexity using standardized patient assessment instrument and/or measureable assessment of functional outcome.  Cumulative Therapy Evaluation is: Low complexity.    Previous and current functional limitations:  (See Goal Flow Sheet for this information)    Short term and Long term goals: (See Goal Flow Sheet for this information)     Communication ability:  Patient appears to be able to clearly communicate and understand  verbal and written communication and follow directions correctly.  Treatment Explanation - The following has been discussed with the patient:   RX ordered/plan of care  Anticipated outcomes  Possible risks and side effects  This patient would benefit from PT intervention to resume normal activities.   Rehab potential is good.    Frequency:  1 X week, once daily  Duration:  for 3 weeks then transition to 2 x month for 2 months  Discharge Plan:  Achieve all LTG.  Independent in home treatment program.  Reach maximal therapeutic benefit.    Please refer to the daily flowsheet for treatment today, total treatment time and time spent performing 1:1 timed codes.

## 2018-06-13 NOTE — MR AVS SNAPSHOT
After Visit Summary   6/13/2018    Jocelin Hernandez    MRN: [id number]           Patient Information     Date Of Birth          1965        Visit Information        Provider Department      6/13/2018 8:50 AM Jocelin Simmons PT [facility]        Today's Diagnoses     Left knee pain, unspecified chronicity    -  1       Follow-ups after your visit        Your next 10 appointments already scheduled     Jun 19, 2018  8:20 AM CDT   [facility] Extremity with [facility] ([facility])    [address] [place] [id number] [phone]            Jun 26, 2018  8:20 AM CDT   [facility] Extremity with [facility] ([facility])    [address] [place] [address] [phone]            Aug 10, 2018  1:00 PM CDT   New Patient Visit with Nola Nielson MD   [facility])    [address] [address]   [phone]              Who to contact     If you have questions or need follow up information about today's clinic visit or your schedule please contact [facility] directly at [phone].  Normal or non-critical lab and imaging results will be communicated to you by MyChart, letter or phone within 4 business days after the clinic has received the results. If you do not hear from us within 7 days, please contact the clinic through MyChart or phone. If you have a critical or abnormal lab result, we will notify you by phone as soon as possible.  Submit refill requests through "[employer] or call your pharmacy and they will forward the refill request to us. Please allow 3 business days for your refill to be completed.          Additional Information About Your Visit        Care EveryWhere ID     This is your Care EveryWhere  ID. This could be used by other organizations to access your [facility] medical records  [id number]         Blood Pressure from Last 3 Encounters:   05/21/18 138/84    Weight from Last 3 Encounters:   05/25/18 77.6 kg (171 lb)   05/21/18 77.6 kg (171 lb)              We Performed the Following     HC PT EVAL, LOW COMPLEXITY     [facility] INITIAL EVAL REPORT     THERAPEUTIC ACTIVITIES     THERAPEUTIC EXERCISES        Primary Care Provider Office Phone # Fax #    Nola Nielson [phone]       [address] [address]        Equal Access to Services     [facility]: Hadii aad ku hadasho Soomaali, waaxda luqadaha, qaybta kaalmada adeegyada, soledad ga hayaan adeyaquelin serrano . So [facility] [phone].    ATENCIÓN: Si habla español, tiene a lantigua disposición servicios gratuitos de asistencia lingüística. Llame al [phone].    We comply with applicable federal civil rights laws and [place] laws. We do not discriminate on the basis of race, color, national origin, age, disability, sex, sexual orientation, or gender identity.            Thank you!     Thank you for choosing [facility]  for your care. Our goal is always to provide you with excellent care. Hearing back from our patients is one way we can continue to improve our services. Please take a few minutes to complete the written survey that you may receive in the mail after your visit with us. Thank you!             Your Updated Medication List - Protect others around you: Learn how to safely use, store and throw away your medicines at www.disposemymeds.org.          This list is accurate as of 6/13/18 10:45 AM.  Always use your most recent med list.                   Brand Name Dispense Instructions for use Diagnosis    acetaminophen 500 MG tablet    TYLENOL     Take 1,000 mg by mouth        albuterol 108 (90 Base) MCG/ACT Inhaler    PROAIR HFA/PROVENTIL HFA/VENTOLIN HFA     Inhale 2 puffs into the lungs         atenolol 25 MG tablet    TENORMIN     Take 25 mg by mouth        buPROPion 150 MG 24 hr tablet    WELLBUTRIN XL     TK 1 T PO QAM

## 2018-06-18 NOTE — PROGRESS NOTES
Program in Human [facility]  [facility]  [address], [address]  [place]  [address]    Group Progress Note    Date of Service: 6/05/18  Client Name: Jocelin Hernandez  YOB: 1965   MRN: [id number]   Number of Minutes: 120  Therapist(s): Kisha Infante and Cathy Davis, PhD, LP     Current Symptoms/Status:  Distress related to partner's struggles with CSB     Progress Toward Treatment Goals:  Attending psychoeducational group       Intervention - Modality and Description:  Supportive and Psychoeducational Techniques designed to increase level of support, improve self-esteem and empower client to make decisions that are in their best interest given the current relational dynamics.   Client participated in a review of boundaries and progress over past week(s).  Followed up by completing my/our sexuality exercise and having discussion about healing sexual wounds.     Response to Intervention:  Client was engaged throughout the group process.      Assignment:  Review question of willingness to be vulnerable.      Interactive Complexity:  none     Diagnosis:  Z51.89 After care        Plan/Need for Future Services:  Begin aftercare group    Amanda Pablo PsyD  Postdoctoral Fellow

## 2018-06-21 NOTE — PROGRESS NOTES
Program in Human Sexuality  [facility]  [address], [address]  [place]  [address]    Group Progress Note    Date of Service: 5/29/18  Client Name: Jocelin Hernandez  YOB: 1965   MRN: [id number]   Number of Minutes: 120  Therapist(s): Amanda Pablo PsyD LMJESSICA and Cathy Davis, PhD, LP     Current Symptoms/Status:  Distress related to partner's struggles with CSB     Progress Toward Treatment Goals:  Attending psychoeducational group     Intervention - Modality and Description:  Supportive and Psychoeducational Techniques designed to increase level of support, improve self-esteem and empower client to make decisions that are in their best interest given the current relational dynamics. Explored intimacy assessment exercise in group and had small group discussion about areas of intimacy that are important and how to make changes with discrepancies. Began discussion on sexuality and introduced sexual wounds worksheet.     Response to Intervention:  Client was engaged throughout the group process.      Assignment:  Review question of willingness to be vulnerable.      Interactive Complexity:  none     Diagnosis:  Z51.89 After care        Plan/Need for Future Services:  Return for aftercare group in July  .    Amanda Pablo PsyD  Postdoctoral Fellow

## 2018-06-28 ENCOUNTER — THERAPY VISIT (OUTPATIENT)
Dept: PHYSICAL THERAPY | Facility: CLINIC | Age: 53
End: 2018-06-28
Payer: COMMERCIAL

## 2018-06-28 DIAGNOSIS — M25.562 LEFT KNEE PAIN, UNSPECIFIED CHRONICITY: ICD-10-CM

## 2018-06-28 PROCEDURE — 97140 MANUAL THERAPY 1/> REGIONS: CPT | Mod: GP | Performed by: PHYSICAL THERAPIST

## 2018-06-28 PROCEDURE — 97530 THERAPEUTIC ACTIVITIES: CPT | Mod: GP | Performed by: PHYSICAL THERAPIST

## 2018-06-28 PROCEDURE — 97110 THERAPEUTIC EXERCISES: CPT | Mod: GP | Performed by: PHYSICAL THERAPIST

## 2018-07-03 ENCOUNTER — RADIANT APPOINTMENT (OUTPATIENT)
Dept: CARDIOLOGY | Facility: CLINIC | Age: 53
End: 2018-07-03
Payer: COMMERCIAL

## 2018-07-03 ENCOUNTER — OFFICE VISIT (OUTPATIENT)
Dept: CARDIOLOGY | Facility: CLINIC | Age: 53
End: 2018-07-03
Attending: INTERNAL MEDICINE
Payer: COMMERCIAL

## 2018-07-03 VITALS
BODY MASS INDEX: 28.49 KG/M2 | HEIGHT: 65 IN | OXYGEN SATURATION: 100 % | SYSTOLIC BLOOD PRESSURE: 134 MMHG | DIASTOLIC BLOOD PRESSURE: 89 MMHG | HEART RATE: 96 BPM | WEIGHT: 171 LBS

## 2018-07-03 DIAGNOSIS — F41.0 PANIC ATTACK: ICD-10-CM

## 2018-07-03 DIAGNOSIS — R00.2 PALPITATIONS: Primary | ICD-10-CM

## 2018-07-03 DIAGNOSIS — R00.2 PALPITATIONS: ICD-10-CM

## 2018-07-03 PROCEDURE — G0463 HOSPITAL OUTPT CLINIC VISIT: HCPCS | Mod: ZF

## 2018-07-03 PROCEDURE — 99204 OFFICE O/P NEW MOD 45 MIN: CPT | Mod: ZP | Performed by: INTERNAL MEDICINE

## 2018-07-03 ASSESSMENT — PAIN SCALES - GENERAL: PAINLEVEL: NO PAIN (0)

## 2018-07-03 NOTE — PATIENT INSTRUCTIONS
"You were seen today in the Cardiovascular Clinic at the [facility].     Cardiology Providers you saw during your visit: Nahun Rios MD     Diagnosis:   Encounter Diagnoses   Name Primary?     Palpitations Yes     Panic attack      Orders:   Orders Placed This Encounter   Procedures     Colonoscopy - HIM Scan     Follow-Up with Cardiac Advanced Practice Provider     Echocardiogram Limited       Current Medication List  Current Outpatient Prescriptions   Medication Sig Dispense Refill     acetaminophen (TYLENOL) 500 MG tablet Take 1,000 mg by mouth       albuterol (PROAIR HFA/PROVENTIL HFA/VENTOLIN HFA) 108 (90 Base) MCG/ACT Inhaler Inhale 2 puffs into the lungs       atenolol (TENORMIN) 25 MG tablet Take 25 mg by mouth       buPROPion (WELLBUTRIN XL) 150 MG 24 hr tablet TK 1 T PO QAM           Medications Discontinued:  There are no discontinued medications.      Recommendations:   1. Echocardiogram     Follow up with Provider - As needed         Please feel free to call me with any questions or concerns.       Phyllis Ramirez LPN     Questions: [phone].   First press #1 for the [employer] for \"Medical Questions\" to reach us Cardiology Nurses.     [phone].   First press #1 for the [employer] and then press #1     On Call Cardiologist for after hours or on weekends: [phone]   option #4 and ask to speak to the on-call Cardiologist.          If you need a medication refill please contact your pharmacy.  Please allow 3 business days for your refill to be completed.  ________________________________________________________________________________________________________________________________     Preventive Care:    Breast Cancer Screening: During our visit today, we discussed that it is recommended you receive breast cancer screening. Please call or make an appointment with your primary care provider to discuss this with them. You may also call the  [facility] scheduling line " ([phone]) to set up a mammography appointment at the Breast Center within the [facility].

## 2018-07-03 NOTE — NURSING NOTE
Chief Complaint   Patient presents with     New Patient     Palpitations     Vitals were taken and medications were reconciled.     ALESHIA Marin  8:05 AM

## 2018-07-03 NOTE — PROGRESS NOTES
I am delighted to see Jocelin Hernandez in consultation.The primary encounter diagnosis was Palpitations. A diagnosis of Panic attack was also pertinent to this visit.   As you know, the patient is a 53 year old  female. She   has a past medical history of Hypertension; Hypothyroid; Palpitations; Pneumonia; and Shortness of breath..    On this visit, the patient states that she has palpitations about once a day associated mostly associated by anxiety.  The patient denies chest pressure/discomfort, dyspnea, near-syncope, syncope, fatigue, orthopnea, paroxysmal nocturnal dyspnea, lower extermity edema and shortness of breath.    The patient's cardiovascular risk factors include hypertension.    The following portions of the patient's history were reviewed and updated as appropriate: allergies, current medications, past family history, past medical history, past social history, past surgical history, and the problem list.    PMH: The patient's past medical history includes:    Past Medical History:   Diagnosis Date     Hypertension      Hypothyroid      Palpitations      Pneumonia      Shortness of breath       Past Surgical History:   Procedure Laterality Date     KNEE SURGERY Left        The patient's medications as of the current encounter are:     Current Outpatient Prescriptions   Medication Sig Dispense Refill     acetaminophen (TYLENOL) 500 MG tablet Take 1,000 mg by mouth       albuterol (PROAIR HFA/PROVENTIL HFA/VENTOLIN HFA) 108 (90 Base) MCG/ACT Inhaler Inhale 2 puffs into the lungs       atenolol (TENORMIN) 25 MG tablet Take 25 mg by mouth       buPROPion (WELLBUTRIN XL) 150 MG 24 hr tablet TK 1 T PO QAM         Labs:     No results found for any previous visit.    Allergies:    Allergies   Allergen Reactions     Hydrocortisone      Seasonal Allergies        Family History:   Family History   Problem Relation Age of Onset     Lung Cancer Mother      Cerebrovascular Disease Father      No Known  "Problems Sister      No Known Problems Son      No Known Problems Daughter      No Known Problems Sister      No Known Problems Daughter        Psychosocial history:  reports that she has never smoked. She has never used smokeless tobacco. She reports that she drinks about 0.6 oz of alcohol per week  She reports that she does not use illicit drugs.    Review of systems: negative for, chest pain, paroxysmal nocturnal dyspnea, dyspnea on exertion, orthopnea, lower extremity edema, syncope or near-syncope, cyanosis, claudication, phlebitis, varicose veins and exercise intolerance    In addition,   General: No change in weight, sleep or appetite.  Normal energy.  No fever or chills  Eyes: Negative for vision changes or eye problems  ENT: No problems with ears, nose or throat.  No difficulty swallowing.  Resp: No coughing, wheezing or shortness of breath. Recovered from pneumonia this spring.  GI: No nausea, vomiting,  heartburn, abdominal pain, diarrhea, constipation or change in bowel habits  : No urinary frequency or dysuria, bladder or kidney problems  Musculoskeletal: No significant muscle.  L>R knee pain  Neurologic: No headaches, numbness, tingling, weakness, problems with balance or coordination  Psychiatric: No problems with depression or mental health. No SI. Frequent anxiety  Heme/immune/allergy: No history of bleeding or clotting problems or anemia.  No allergies or immune system problems  Endocrine: No history of  Diabetes.  Hypothyroid (meds recently raised.)  Skin: No rashes,worrisome lesions or skin problems  Vascular:  No claudication, lifestyle limiting or otherwise; no ischemic rest pain; no non-healing ulcers. No weakness, No loss of sensation        Physical examination  Vitals: /89 (BP Location: Left arm, Patient Position: Chair, Cuff Size: Adult Regular)  Pulse 96  Ht 1.651 m (5' 5\")  Wt 77.6 kg (171 lb)  SpO2 100%  BMI 28.46 kg/m2  BMI= Body mass index is 28.46 kg/(m^2).    In " general, the patient is a pleasant female in no apparent distress.    HEENT: Normiocephalic and atraumatic.  PERRLA.  EOMI.  Sclerae white, not injected.  Nares clear.  Pharynx without erythema or exudate.  Dentition intact.    Neck: No adenopathy.  No thyromegaly. Carotids +2/2 bilaterally without bruits.  No jugular venous distension.   Heart:  The PMI is in the 5th ICS in the midclavicular line. There is no heave. Regular rate and rhythm. Normal S1, S2 splits physiologically. No murmur, rub, click, or gallop.    Lungs: Clear to asculation.  No ronchi, wheezes, rales.  No dullness to percussion.   Abdomen: Soft, nontender, nondistended. No organomegaly. No AAA.  No bruits.   Extremities: No clubbing, cyanosis, or edema. The pulses were intact bilaterally.   Neurological: The neurological examination reveal a patient who was oriented to person, place, and time.  The remainder of the examination was nonfocal.    Cardiac tests include:    Holter reviewed.  Pt says Holter recorded her palpitations.  Holter shows no significant ventricular ectopy and rare SVT, most likely sinus tach.    Assessment and Plan    1. Palpitations - will get echo, especially since history of cyanosis elliott-delivery  - may treat expectantly or with PRN beta blocker  - most likely secondary to anxiety  2. HTN - trial of exercise    The patient is to return  PRN. The patient understood the treatment plan as outlined above.  There were no barriers to learning.      Nate Rios MD

## 2018-07-03 NOTE — MR AVS SNAPSHOT
"              After Visit Summary   7/3/2018    Jocelin Hernandez    MRN: [id number]           Patient Information     Date Of Birth          1965        Visit Information        Provider Department      7/3/2018 8:00 AM Nate Rios MD [facility]        Today's Diagnoses     Palpitations    -  1    Panic attack          Care Instructions    You were seen today in the Cardiovascular Clinic at the [facility].     Cardiology Providers you saw during your visit: Nahun Rios MD     Diagnosis:   Encounter Diagnoses   Name Primary?     Palpitations Yes     Panic attack      Orders:   Orders Placed This Encounter   Procedures     Colonoscopy - HIM Scan     Follow-Up with Cardiac Advanced Practice Provider     Echocardiogram Limited       Current Medication List  Current Outpatient Prescriptions   Medication Sig Dispense Refill     acetaminophen (TYLENOL) 500 MG tablet Take 1,000 mg by mouth       albuterol (PROAIR HFA/PROVENTIL HFA/VENTOLIN HFA) 108 (90 Base) MCG/ACT Inhaler Inhale 2 puffs into the lungs       atenolol (TENORMIN) 25 MG tablet Take 25 mg by mouth       buPROPion (WELLBUTRIN XL) 150 MG 24 hr tablet TK 1 T PO QAM           Medications Discontinued:  There are no discontinued medications.      Recommendations:   1. Echocardiogram     Follow up with Provider - As needed         Please feel free to call me with any questions or concerns.       Phyllis Ramirez LPN     Questions: [phone].   First press #1 for the [employer] for \"Medical Questions\" to reach us Cardiology Nurses.     [phone].   First press #1 for the [employer] and then press #1     On Call Cardiologist for after hours or on weekends: [phone]   option #4 and ask to speak to the on-call Cardiologist.          If you need a medication refill please contact your pharmacy.  Please allow 3 business days for your refill to be " completed.  ________________________________________________________________________________________________________________________________     Preventive Care:    Breast Cancer Screening: During our visit today, we discussed that it is recommended you receive breast cancer screening. Please call or make an appointment with your primary care provider to discuss this with them. You may also call the [facility] scheduling line ([phone]) to set up a mammography appointment at the Breast Center within the [facility].              Follow-ups after your visit        Additional Services     Follow-Up with Cardiac Advanced Practice Provider       As needed                  Your next 10 appointments already scheduled     Jul 03, 2018 10:00 AM CDT   Ech Limited with [id number]   [facility] Echo ([facility])    [address]  3rd Floor  [facility] [id number] [phone]           1.  Please bring or wear a comfortable two-piece outfit. 2.  You may eat, drink and take your normal medicines. 3.  For any questions that cannot be answered, please contact the ordering physician            Jul 17, 2018  4:00 PM CDT   GROUP with [facility] ([facility])    [address]  [address] 75[address]  [facility] [address]   [phone]            Aug 10, 2018  1:00 PM CDT   New Patient Visit with Nola Nielson MD   [facility] ([facility])    [facility]  [address]  [facility] [address]   [phone]            Aug 21, 2018  4:00 PM CDT   GROUP with [facility] ([facility])    [address]  [address]  [facility] [address]   [phone]              Future tests that were ordered for you today     Open Future Orders        Priority Expected Expires Ordered    Follow-Up with Cardiac Advanced Practice Provider Routine   "10/1/2019 7/3/2018    Echocardiogram Limited Routine 7/3/2018 10/1/2018 7/3/2018            Who to contact     If you have questions or need follow up information about today's clinic visit or your schedule please contact [facility] directly at [phone].  Normal or non-critical lab and imaging results will be communicated to you by MyChart, letter or phone within 4 business days after the clinic has received the results. If you do not hear from us within 7 days, please contact the clinic through MyChart or phone. If you have a critical or abnormal lab result, we will notify you by phone as soon as possible.  Submit refill requests through Tier 3 or call your pharmacy and they will forward the refill request to us. Please allow 3 business days for your refill to be completed.          Additional Information About Your Visit        Care EveryWhere ID     This is your Care EveryWhere ID. This could be used by other organizations to access your [place] medical records  [id number]        Your Vitals Were     Pulse Height Pulse Oximetry BMI (Body Mass Index)          96 1.651 m (5' 5\") 100% 28.46 kg/m2         Blood Pressure from Last 3 Encounters:   07/03/18 134/89   05/21/18 138/84    Weight from Last 3 Encounters:   07/03/18 77.6 kg (171 lb)   05/25/18 77.6 kg (171 lb)   05/21/18 77.6 kg (171 lb)              We Performed the Following     Colonoscopy - HIM Scan        Primary Care Provider Office Phone # Fax #    Nola Nielson [phone]       [address] [place] [id number]        Equal Access to Services     [facility]: Hadii aad stephen hadasho Soerasmo, waaxda luqadaha, qaybta kaalmada soledad lundberg . So [facility] [phone].    ATENCIÓN: Si habla español, tiene a lantigua disposición servicios gratuitos de asistencia lingüística. Llame al [phone].    We comply with applicable federal civil rights laws and [place] laws. We do not " discriminate on the basis of race, color, national origin, age, disability, sex, sexual orientation, or gender identity.            Thank you!     Thank you for choosing [facility]  for your care. Our goal is always to provide you with excellent care. Hearing back from our patients is one way we can continue to improve our services. Please take a few minutes to complete the written survey that you may receive in the mail after your visit with us. Thank you!             Your Updated Medication List - Protect others around you: Learn how to safely use, store and throw away your medicines at www.disposemymeds.org.          This list is accurate as of 7/3/18  9:06 AM.  Always use your most recent med list.                   Brand Name Dispense Instructions for use Diagnosis    acetaminophen 500 MG tablet    TYLENOL     Take 1,000 mg by mouth        albuterol 108 (90 Base) MCG/ACT Inhaler    PROAIR HFA/PROVENTIL HFA/VENTOLIN HFA     Inhale 2 puffs into the lungs        atenolol 25 MG tablet    TENORMIN     Take 25 mg by mouth        buPROPion 150 MG 24 hr tablet    WELLBUTRIN XL     TK 1 T PO QAM

## 2018-07-03 NOTE — LETTER
7/3/2018    RE: Jocelin Hernandez  [address]  [place] [facility] [place] [address]       Dear Colleague,    Thank you for the opportunity to participate in the care of your patient, Jocelin Hernandez, at the [facility]. Please see a copy of my visit note below.    I am delighted to see Jocelin Hernandez in consultation.The primary encounter diagnosis was Palpitations. A diagnosis of Panic attack was also pertinent to this visit.   As you know, the patient is a 53 year old  female. She   has a past medical history of Hypertension; Hypothyroid; Palpitations; Pneumonia; and Shortness of breath..    On this visit, the patient states that she has palpitations about once a day associated mostly associated by anxiety.  The patient denies chest pressure/discomfort, dyspnea, near-syncope, syncope, fatigue, orthopnea, paroxysmal nocturnal dyspnea, lower extermity edema and shortness of breath.    The patient's cardiovascular risk factors include hypertension.    The following portions of the patient's history were reviewed and updated as appropriate: allergies, current medications, past family history, past medical history, past social history, past surgical history, and the problem list.    PMH: The patient's past medical history includes:    Past Medical History:   Diagnosis Date     Hypertension      Hypothyroid      Palpitations      Pneumonia      Shortness of breath       Past Surgical History:   Procedure Laterality Date     KNEE SURGERY Left        The patient's medications as of the current encounter are:     Current Outpatient Prescriptions   Medication Sig Dispense Refill     acetaminophen (TYLENOL) 500 MG tablet Take 1,000 mg by mouth       albuterol (PROAIR HFA/PROVENTIL HFA/VENTOLIN HFA) 108 (90 Base) MCG/ACT Inhaler Inhale 2 puffs into the lungs       atenolol (TENORMIN) 25 MG tablet Take 25 mg by mouth       buPROPion (WELLBUTRIN XL) 150 MG 24 hr tablet  TK 1 T PO QAM         Labs:     No results found for any previous visit.    Allergies:    Allergies   Allergen Reactions     Hydrocortisone      Seasonal Allergies        Family History:   Family History   Problem Relation Age of Onset     Lung Cancer Mother      Cerebrovascular Disease Father      No Known Problems Sister      No Known Problems Son      No Known Problems Daughter      No Known Problems Sister      No Known Problems Daughter        Psychosocial history:  reports that she has never smoked. She has never used smokeless tobacco. She reports that she drinks about 0.6 oz of alcohol per week  She reports that she does not use illicit drugs.    Review of systems: negative for, chest pain, paroxysmal nocturnal dyspnea, dyspnea on exertion, orthopnea, lower extremity edema, syncope or near-syncope, cyanosis, claudication, phlebitis, varicose veins and exercise intolerance    In addition,   General: No change in weight, sleep or appetite.  Normal energy.  No fever or chills  Eyes: Negative for vision changes or eye problems  ENT: No problems with ears, nose or throat.  No difficulty swallowing.  Resp: No coughing, wheezing or shortness of breath. Recovered from pneumonia this spring.  GI: No nausea, vomiting,  heartburn, abdominal pain, diarrhea, constipation or change in bowel habits  : No urinary frequency or dysuria, bladder or kidney problems  Musculoskeletal: No significant muscle.  L>R knee pain  Neurologic: No headaches, numbness, tingling, weakness, problems with balance or coordination  Psychiatric: No problems with depression or mental health. No SI. Frequent anxiety  Heme/immune/allergy: No history of bleeding or clotting problems or anemia.  No allergies or immune system problems  Endocrine: No history of  Diabetes.  Hypothyroid (meds recently raised.)  Skin: No rashes,worrisome lesions or skin problems  Vascular:  No claudication, lifestyle limiting or otherwise; no ischemic rest pain; no  "non-healing ulcers. No weakness, No loss of sensation        Physical examination  Vitals: /89 (BP Location: Left arm, Patient Position: Chair, Cuff Size: Adult Regular)  Pulse 96  Ht 1.651 m (5' 5\")  Wt 77.6 kg (171 lb)  SpO2 100%  BMI 28.46 kg/m2  BMI= Body mass index is 28.46 kg/(m^2).    In general, the patient is a pleasant female in no apparent distress.    HEENT: Normiocephalic and atraumatic.  PERRLA.  EOMI.  Sclerae white, not injected.  Nares clear.  Pharynx without erythema or exudate.  Dentition intact.    Neck: No adenopathy.  No thyromegaly. Carotids +2/2 bilaterally without bruits.  No jugular venous distension.   Heart:  The PMI is in the 5th ICS in the midclavicular line. There is no heave. Regular rate and rhythm. Normal S1, S2 splits physiologically. No murmur, rub, click, or gallop.    Lungs: Clear to asculation.  No ronchi, wheezes, rales.  No dullness to percussion.   Abdomen: Soft, nontender, nondistended. No organomegaly. No AAA.  No bruits.   Extremities: No clubbing, cyanosis, or edema. The pulses were intact bilaterally.   Neurological: The neurological examination reveal a patient who was oriented to person, place, and time.  The remainder of the examination was nonfocal.    Cardiac tests include:    Holter reviewed.  Pt says Holter recorded her palpitations.  Holter shows no significant ventricular ectopy and rare SVT, most likely sinus tach.    Assessment and Plan    1. Palpitations - will get echo, especially since history of cyanosis elliott-delivery  - may treat expectantly or with PRN beta blocker  - most likely secondary to anxiety  2. HTN - trial of exercise    The patient is to return  PRN. The patient understood the treatment plan as outlined above.  There were no barriers to learning.      Nate Rios MD     "

## 2018-07-17 ENCOUNTER — OFFICE VISIT (OUTPATIENT)
Dept: OTHER | Facility: OUTPATIENT CENTER | Age: 53
End: 2018-07-17

## 2018-07-17 DIAGNOSIS — Z51.89 AFTER CARE: Primary | ICD-10-CM

## 2018-07-17 NOTE — MR AVS SNAPSHOT
After Visit Summary   7/17/2018    Jocelin Hernandez    MRN: [id number]           Patient Information     Date Of Birth          1965        Visit Information        Provider Department      7/17/2018 4:00 PM [facility]        Today's Diagnoses     After care    -  1       Follow-ups after your visit        Your next 10 appointments already scheduled     Aug 21, 2018  4:00 PM CDT   GROUP with [facility] ([facility])    [address]  Mail Code 7521  [facility] [address]   [phone]              Who to contact     Please call your clinic at [phone] to:    Ask questions about your health    Make or cancel appointments    Discuss your medicines    Learn about your test results    Speak to your doctor            Additional Information About Your Visit        MyChart Information     Jet gives you secure access to your electronic health record. If you see a primary care provider, you can also send messages to your care team and make appointments. If you have questions, please call your primary care clinic.  If you do not have a primary care provider, please call [phone] and they will assist you.      Jet is an electronic gateway that provides easy, online access to your medical records. With Jet, you can request a clinic appointment, read your test results, renew a prescription or communicate with your care team.     To access your existing account, please contact your [facility] or call [phone] for assistance.        Care EveryWhere ID     This is your Care EveryWhere ID. This could be used by other organizations to access your [place] medical records  [id number]         Blood Pressure from Last 3 Encounters:   08/10/18 133/83   07/03/18 134/89   05/21/18 138/84    Weight from Last 3 Encounters:   08/10/18 76.7 kg (169 lb)   07/03/18 77.6 kg (171 lb)    05/25/18 77.6 kg (171 lb)              We Performed the Following     Support Group - 90 Min. [[id number]]        Primary Care Provider Office Phone # Fax #    Nola Nielson [phone]       [address] [address]        Equal Access to Services     [facility] : Hadii aad ku hadasho Soomaali, waaxda luqadaha, qaybta kaalmada adeegyada, waxay idiin hayaan adeeg analiash la'corinen . So [facility] [phone].    ATENCIÓN: Si habla español, tiene a lantigua disposición servicios gratuitos de asistencia lingüística. Musa al [phone].    We comply with applicable federal civil rights laws and [place] laws. We do not discriminate on the basis of race, color, national origin, age, disability, sex, sexual orientation, or gender identity.            Thank you!     Thank you for choosing [facility]  for your care. Our goal is always to provide you with excellent care. Hearing back from our patients is one way we can continue to improve our services. Please take a few minutes to complete the written survey that you may receive in the mail after your visit with us. Thank you!             Your Updated Medication List - Protect others around you: Learn how to safely use, store and throw away your medicines at www.disposemymeds.org.          This list is accurate as of 7/17/18 11:59 PM.  Always use your most recent med list.                   Brand Name Dispense Instructions for use Diagnosis    acetaminophen 500 MG tablet    TYLENOL     Take 1,000 mg by mouth        albuterol 108 (90 Base) MCG/ACT inhaler    PROAIR HFA/PROVENTIL HFA/VENTOLIN HFA     Inhale 2 puffs into the lungs        atenolol 25 MG tablet    TENORMIN     Take 25 mg by mouth        buPROPion 150 MG 24 hr tablet    WELLBUTRIN XL     TK 1 T PO QAM

## 2018-08-06 NOTE — PROGRESS NOTES
Program in [facility]  [facility]  [address], [address]  [place]  [address]    Group Progress Note    Date of Service: 7/17/18  Client Name: Jocelin Hernandez  YOB: 1965   MRN: [id number]   Number of Minutes: 60  Therapist(s): Kisha Infante      Current Symptoms/Status:  Distress related to partner's struggles with CSB     Progress Toward Treatment Goals:  Attends monthly support group.    Intervention - Modality and Description:  Supportive techniques.     Response to Intervention:  Client shared that she was diagnosed with stage 3 adrenal fatigue and has asked her  for a separation.     Assignment:  None     Interactive Complexity:  none     Diagnosis:  Z51.89 After care        Plan/Need for Future Services:  Continue aftercare group    Amanda Pablo PsyD  Postdoctoral Fellow

## 2018-08-10 ENCOUNTER — OFFICE VISIT (OUTPATIENT)
Dept: FAMILY MEDICINE | Facility: CLINIC | Age: 53
End: 2018-08-10
Payer: COMMERCIAL

## 2018-08-10 VITALS
DIASTOLIC BLOOD PRESSURE: 83 MMHG | SYSTOLIC BLOOD PRESSURE: 133 MMHG | BODY MASS INDEX: 28.16 KG/M2 | HEART RATE: 83 BPM | TEMPERATURE: 98.4 F | OXYGEN SATURATION: 99 % | HEIGHT: 65 IN | WEIGHT: 169 LBS

## 2018-08-10 DIAGNOSIS — K92.9 DIGESTIVE DISORDER: ICD-10-CM

## 2018-08-10 DIAGNOSIS — G89.29 CHRONIC PAIN OF LEFT KNEE: Primary | ICD-10-CM

## 2018-08-10 DIAGNOSIS — E03.8 SUBCLINICAL HYPOTHYROIDISM: ICD-10-CM

## 2018-08-10 DIAGNOSIS — M25.562 CHRONIC PAIN OF LEFT KNEE: Primary | ICD-10-CM

## 2018-08-10 DIAGNOSIS — R61 NIGHT SWEATS: ICD-10-CM

## 2018-08-10 DIAGNOSIS — Z72.820 POOR SLEEP: ICD-10-CM

## 2018-08-10 DIAGNOSIS — N32.9 BLADDER PROBLEM: ICD-10-CM

## 2018-08-10 DIAGNOSIS — F43.9 STRESS AT HOME: ICD-10-CM

## 2018-08-10 PROBLEM — Z51.89 AFTER CARE: Status: RESOLVED | Noted: 2018-03-20 | Resolved: 2018-08-10

## 2018-08-10 PROBLEM — I10 HTN (HYPERTENSION): Status: RESOLVED | Noted: 2018-05-22 | Resolved: 2018-08-10

## 2018-08-10 LAB
BILIRUBIN UR: NEGATIVE
BLOOD UR: ABNORMAL
GLUCOSE URINE: NEGATIVE
KETONES UR QL: NEGATIVE
LEUKOCYTE ESTERASE UR: NEGATIVE
NITRITE UR QL STRIP: NEGATIVE
PH UR STRIP: 7.5 [PH] (ref 5–7)
PROTEIN UR: NEGATIVE
SP GR UR STRIP: 1.02
UROBILINOGEN UR STRIP-ACNC: ABNORMAL

## 2018-08-10 RX ORDER — THYROID 15 MG/1
TABLET ORAL
COMMUNITY
Start: 2018-06-21 | End: 2019-03-02

## 2018-08-10 RX ORDER — THYROID 30 MG/1
TABLET ORAL
COMMUNITY
Start: 2018-06-21 | End: 2019-03-02

## 2018-08-10 ASSESSMENT — PAIN SCALES - GENERAL: PAINLEVEL: MILD PAIN (2)

## 2018-08-10 NOTE — MR AVS SNAPSHOT
"              After Visit Summary   8/10/2018    Jocelin Hernandez    MRN: [id number]           Patient Information     Date Of Birth          1965        Visit Information        Provider Department      8/10/2018 1:00 PM Nola Nielson MD [facility]        Today's Diagnoses     Chronic pain of left knee    -  1    Night sweats        Bladder problem          Care Instructions    [facility] sports medicine clinic [phone]: Partha Thorpe,     Physical Therapy [call [phone]]  [facility] or RADHA Johnson MD/nutritionist.  [email or web address]    Progesterone 100 mg at bedtime    Follow-up in two months.           Follow-ups after your visit        Additional Services     PHYSICAL THERAPY REFERRAL       This therapy referral will be filtered to a centralized scheduling office at [facility] and the patient will receive a call to schedule an appointment at a [facility] location most convenient for them.     [facility] provides Physical Therapy evaluation and treatment and many specialty services across the [facility] system.  If requesting a specialty program, please choose from the list below.    If you have not heard from the scheduling office within 2 business days, please call [phone] for all locations, with the exception of [place], please call [phone] and [facility], please call [phone]  Treatment: Evaluation & Treatment  Special Instructions/Modalities: PT  Special Programs: None    Please be aware that coverage of these services is subject to the terms and limitations of your health insurance plan.  Call member services at your health plan with any benefit or coverage questions.      **Note to Provider:  If you are referring outside of [facility] for the therapy appointment, please list the name of the location in the \"special instructions\" above, print the referral and give to the patient to schedule the appointment.                " "  Your next 10 appointments already scheduled     Aug 21, 2018  4:00 PM CDT   GROUP with  [facility] ([facility])    [address]  Mail Code 7521  [facility] [address]   [phone]              Who to contact     Please call your clinic at [phone] to:    Ask questions about your health    Make or cancel appointments    Discuss your medicines    Learn about your test results    Speak to your doctor            Additional Information About Your Visit        [employer] Messenger Information     Face-Me gives you secure access to your electronic health record. If you see a primary care provider, you can also send messages to your care team and make appointments. If you have questions, please call your primary care clinic.  If you do not have a primary care provider, please call [phone] and they will assist you.      Face-Me is an electronic gateway that provides easy, online access to your medical records. With Face-Me, you can request a clinic appointment, read your test results, renew a prescription or communicate with your care team.     To access your existing account, please contact your [facility] or call [phone] for assistance.        Care EveryWhere ID     This is your Care EveryWhere ID. This could be used by other organizations to access your [facility] medical records  [id number]        Your Vitals Were     Pulse Temperature Height Pulse Oximetry BMI (Body Mass Index)       83 98.4  F (36.9  C) (Oral) 5' 5\" (165.1 cm) 99% 28.12 kg/m2        Blood Pressure from Last 3 Encounters:   08/10/18 133/83   07/03/18 134/89   05/21/18 138/84    Weight from Last 3 Encounters:   08/10/18 169 lb (76.7 kg)   07/03/18 171 lb (77.6 kg)   05/25/18 171 lb (77.6 kg)              We Performed the Following     Colonoscopy - HIM Scan     Mammogram - HIM Scan     PAP Smear - HIM Patient Reported     PHYSICAL THERAPY REFERRAL     Urinalysis " ([place])     Urine Culture Aerobic Bacterial          Today's Medication Changes          These changes are accurate as of 8/10/18  1:47 PM.  If you have any questions, ask your nurse or doctor.               Start taking these medicines.        Dose/Directions    progesterone 100 MG capsule   Commonly known as:  PROMETRIUM   Used for:  Night sweats   Started by:  Nola Nielson MD        Dose:  100 mg   Take 1 capsule (100 mg) by mouth daily   Quantity:  30 capsule   Refills:  1            Where to get your medicines      These medications were sent to [facility] [address] [facility] [address] [facility] [address] AT SEC of [facility] [address]  [address] [facility] [facility] [address], [facility] [address]     Phone:  [phone]     progesterone 100 MG capsule                Primary Care Provider Office Phone # Fax #    Nola Nielson [phone]       [address] [facility] [address]  [facility] [address]        Equal Access to Services     [facility] : Hadii aad ku hadasho Soomaali, waaxda luqadaha, qaybta kaalmada adeegyada, waxay idiin hayaan archie serrano . So [facility] [phone].    ATENCIÓN: Si habla español, tiene a lantigua disposición servicios gratuitos de asistencia lingüística. Musa al [phone].    We comply with applicable federal civil rights laws and [place] laws. We do not discriminate on the basis of race, color, national origin, age, disability, sex, sexual orientation, or gender identity.            Thank you!     Thank you for choosing [facility]  for your care. Our goal is always to provide you with excellent care. Hearing back from our patients is one way we can continue to improve our services. Please take a few minutes to complete the written survey that you may receive in the mail after your visit with us. Thank you!             Your Updated Medication List - Protect others around you: Learn how to safely use, store and throw away your medicines at www.disposemymeds.org.           This list is accurate as of 8/10/18  1:47 PM.  Always use your most recent med list.                   Brand Name Dispense Instructions for use Diagnosis    acetaminophen 500 MG tablet    TYLENOL     Take 1,000 mg by mouth        albuterol 108 (90 Base) MCG/ACT inhaler    PROAIR HFA/PROVENTIL HFA/VENTOLIN HFA     Inhale 2 puffs into the lungs        * ARMOUR THYROID 15 MG Tabs tablet   Generic drug:  thyroid      TK 1 T PO D WITH A 30MG DOSE        * ARMOUR THYROID 30 MG tablet   Generic drug:  thyroid      TK 1 T PO D        atenolol 25 MG tablet    TENORMIN     Take 25 mg by mouth        buPROPion 150 MG 24 hr tablet    WELLBUTRIN XL     TK 1 T PO QAM        FIRST-PROGESTERONE VGS 25 VA           progesterone 100 MG capsule    PROMETRIUM    30 capsule    Take 1 capsule (100 mg) by mouth daily    Night sweats       TESTOSTERONE 2 MG/GM CREAM      Apply topically 1 gram to inner thigh or periclitorial area nightly        * Notice:  This list has 2 medication(s) that are the same as other medications prescribed for you. Read the directions carefully, and ask your doctor or other care provider to review them with you.

## 2018-08-10 NOTE — PATIENT INSTRUCTIONS
[facility] sports medicine clinic [phone]: Octavia; Maurilio,     Physical Therapy [call [phone]]  [facility] or RADHA Johnson MD/nutritionist.  [email or web address]    Progesterone 100 mg at bedtime    Follow-up in two months.

## 2018-08-10 NOTE — PROGRESS NOTES
"Jocelin is a 53 year old female new patient to [facility] presents to establish care:   HPI:  Wants to consolidate care:  - Left knee surgery [arthroscopic for meniscus tear] at [facility] in 2018.  Minimal improvement with swelling and on going pain in knee. Has followed up [facility] and sports medicine clinic at [facility].  Went to Physical therapy and hard to schedule. Limits her exercise and gaining weight. This has caused achiness in the hips because of change in gait  - IBS with diarrhea: saw [facility] 2018 with colonoscopy and symptoms got better and now symptoms are back with loose stools/explosive diarrhea ~ four times a day. Know specific trigger.  Started about one year ago with poor digestion.   - HT: started by NP with Dr. Cheema ~ 6 weeks ago.  No significant change or improvement of sleep  - Worried about her adrenals \" told she is in stage 3 adrenal dysfuntion\" by the NP  - Very dysfunctional/stressful relationship with . Private therapist and marriage therapist. Having panic attacks.   ROS:  General: generally does not feel well. .    Head/Eyes: none  Ears/Nose/Throat: none  Cardiovascular: palpitations  Respiratory: none  Gastrointestinal: diarrhea  Breast: none  Genitourinary:  Sexual Function: problematic   Musculoskeletal: none  Skin: none  Neurological: none  Mental Health: anxiety and panic attacks  - stress is trigger   Endocrine: none  PROBLEM LIST:  Hypertension; Hypothyroid; Palpitations [has been cardiology] and Anxiety.   Patient Active Problem List   Diagnosis     After care     HTN (hypertension)     Rosacea     Left knee pain     Palpitations     Panic attack   OB/GYN HISTORY:   . [26 - 26 and 15]  Menopause: LMP [after ablation ].   Night sweats \"feeling heat\" started about one year ago.  Sleep is often a problems - wakes frequently.   On HT: from NP at [facility] [Started ~ July progesterone at 25 mg [cream] at hs, tstosterone 2 mg 1/2 g in AM and super adrenal " and armour thyroid 45 mg for about six weeks].   PAST MEDICAL HISTORY:  Past Medical History:   Diagnosis Date     Hypertension      Hypothyroid      Palpitations      Pneumonia      Shortness of breath    Life Style Modifiers:   Tobacco:  reports that she has never smoked. She has never used smokeless tobacco.   Alcohol:  reports that she drinks about 0.6 oz of alcohol per week    Drug use:  reports that she does not use illicit drugs.  Exercise: limited                  Diet: eats gluten free and regimented. Low carb/high protein. Was doing a ketodiet [< 10 carbs a day]   CAM Providers:      NP: Yes    Naturopath: No    Chiropractor: No    Energy Healer: No    Homeopath: No    Acupuncture: No  PAST SURGICAL HISTORY:  Past Surgical History:   Procedure Laterality Date     KNEE SURGERY Left    FAMILY HISTORY:  Family History   Problem Relation Age of Onset     Lung Cancer Mother      Cerebrovascular Disease Father      No Known Problems Sister      No Known Problems Son      No Known Problems Daughter      No Known Problems Sister      No Known Problems Daughter    SOCIAL HISTORY:  Lives in [place].  [ stays in the lower level of the house].  15 year old son.  for 31+ years.   Social History     Marital status:      Spouse name: N/A     Number of children: N/A     Years of education: N/A     Social History Main Topics     Smoking status: Never Smoker     Smokeless tobacco: Never Used     Alcohol use 0.6 oz/week     1 Glasses of wine per week      Comment: socially     Drug use: No     Sexual activity: Not on file   MEDICATIONS:  Current Outpatient Prescriptions   Medication Sig Dispense Refill     FIRST-PROGESTERONE VGS 25 VA        TESTOSTERONE 2 MG/GM CREAM Apply topically 1 gram to inner thigh or periclitorial area nightly       thyroid (ARMOUR THYROID) 15 MG TABS tablet TK 1 T PO D WITH A 30MG DOSE       thyroid (ARMOUR THYROID) 30 MG tablet TK 1 T PO D       acetaminophen  "(TYLENOL) 500 MG tablet Take 1,000 mg by mouth       albuterol (PROAIR HFA/PROVENTIL HFA/VENTOLIN HFA) 108 (90 Base) MCG/ACT Inhaler Inhale 2 puffs into the lungs       atenolol (TENORMIN) 25 MG tablet Take 25 mg by mouth       buPROPion (WELLBUTRIN XL) 150 MG 24 hr tablet TK 1 T PO QAM     ALLERGIES:  Hydrocortisone and Seasonal allergies  VITALS:  Blood pressure 133/83, pulse 83, temperature 98.4  F (36.9  C), temperature source Oral, height 5' 5\" (165.1 cm), weight 169 lb (76.7 kg), SpO2 99 %. Body mass index is 28.12 kg/(m^2).  PHYSICAL EXAM:  Constitutional: Well appearing woman in no acute distress.   Psychological: depressed.  Eyes: anicteric, normal extra-ocular movements,   Ears, Nose and Throat: tympanic membranes clear,   Musculoskeletal: full range of motion    Skin: no concerning lesions, no jaundice.  Neurological: normal gait, no tremor.   Total of 45 minutes was spent in direct contact with the patient and >50% of the time in patient education and coordination of care.  Diagnoses and associated orders for this visit:  Chronic pain of left knee  -     PHYSICAL THERAPY REFERRAL  -     Sports medicine referral   Poor sleep/Night sweats  -     progesterone (PROMETRIUM) 100 MG capsule; Take 1 capsule (100 mg) by mouth daily  -     Discussed the option of estrogen therapy - she wants to defer and give a higher dose of progesterone a trial   Digestive disorder        -    Consult - Wilda Johnson MD/nutritionist.  [email or web address]  Stress at home/adrenal fatigue        -    Therapist/marriage counselor  Subclinical hypothyroidism        -     Continue with low dose armour thyroid [45 mg]  Bladder problem  -     Urinalysis ([place])- negative today    Discuss at length a functional medicine approach to her symptoms  Follow-up in two months  Will get labs on follow-up of thyroid      "

## 2018-08-10 NOTE — NURSING NOTE
"53 year old  Chief Complaint   Patient presents with     Establish Care     Physical     UTI       Blood pressure 133/83, pulse 83, temperature 98.4  F (36.9  C), temperature source Oral, height 5' 5\" (165.1 cm), weight 169 lb (76.7 kg), SpO2 99 %. Body mass index is 28.12 kg/(m^2).  Patient Active Problem List   Diagnosis     After care     HTN (hypertension)     Rosacea     Left knee pain     Palpitations     Panic attack       Wt Readings from Last 2 Encounters:   08/10/18 169 lb (76.7 kg)   07/03/18 171 lb (77.6 kg)     BP Readings from Last 3 Encounters:   08/10/18 133/83   07/03/18 134/89   05/21/18 138/84         Current Outpatient Prescriptions   Medication     FIRST-PROGESTERONE VGS 25 VA     TESTOSTERONE 2 MG/GM CREAM     thyroid (ARMOUR THYROID) 15 MG TABS tablet     thyroid (ARMOUR THYROID) 30 MG tablet     acetaminophen (TYLENOL) 500 MG tablet     albuterol (PROAIR HFA/PROVENTIL HFA/VENTOLIN HFA) 108 (90 Base) MCG/ACT Inhaler     atenolol (TENORMIN) 25 MG tablet     buPROPion (WELLBUTRIN XL) 150 MG 24 hr tablet     No current facility-administered medications for this visit.        Social History   Substance Use Topics     Smoking status: Never Smoker     Smokeless tobacco: Never Used     Alcohol use 0.6 oz/week     1 Glasses of wine per week      Comment: socially       Health Maintenance Due   Topic Date Due     PHQ-2 Q1 YR  01/10/1977     TETANUS IMMUNIZATION (SYSTEM ASSIGNED)  01/10/1983     HIV SCREEN (SYSTEM ASSIGNED)  01/10/1983     HEPATITIS C SCREENING  01/10/1983     PAP SCREENING Q3 YR (SYSTEM ASSIGNED)  01/10/1986     LIPID SCREEN Q5 YR FEMALE (SYSTEM ASSIGNED)  01/10/2010     MAMMO SCREEN Q2 YR (SYSTEM ASSIGNED)  01/10/2015       No results found for: PAP      Vanesa Don CMA  August 10, 2018 1:04 PM  "

## 2018-08-13 NOTE — PROGRESS NOTES
I did not personally attend the group therapy session.  I reviewed and agree with the assessment and plan as documented in this note. Edda Bocanegra, PhD, LP

## 2018-08-21 ENCOUNTER — OFFICE VISIT (OUTPATIENT)
Dept: OTHER | Facility: OUTPATIENT CENTER | Age: 53
End: 2018-08-21

## 2018-08-21 ENCOUNTER — THERAPY VISIT (OUTPATIENT)
Dept: PHYSICAL THERAPY | Facility: CLINIC | Age: 53
End: 2018-08-21
Payer: COMMERCIAL

## 2018-08-21 DIAGNOSIS — Z51.89 AFTERCARE: Primary | ICD-10-CM

## 2018-08-21 DIAGNOSIS — M25.562 LEFT KNEE PAIN: Primary | ICD-10-CM

## 2018-08-21 PROCEDURE — 97110 THERAPEUTIC EXERCISES: CPT | Mod: GP | Performed by: PHYSICAL THERAPIST

## 2018-08-21 PROCEDURE — 97112 NEUROMUSCULAR REEDUCATION: CPT | Mod: GP | Performed by: PHYSICAL THERAPIST

## 2018-08-21 ASSESSMENT — ACTIVITIES OF DAILY LIVING (ADL)
SQUAT: ACTIVITY IS FAIRLY DIFFICULT
KNEE_ACTIVITY_OF_DAILY_LIVING_SUM: 42
STAND: ACTIVITY IS NOT DIFFICULT
RAW_SCORE: 42
LIMPING: THE SYMPTOM AFFECTS MY ACTIVITY MODERATELY
SWELLING: THE SYMPTOM AFFECTS MY ACTIVITY MODERATELY
PAIN: THE SYMPTOM AFFECTS MY ACTIVITY MODERATELY
WEAKNESS: THE SYMPTOM AFFECTS MY ACTIVITY MODERATELY
WALK: ACTIVITY IS MINIMALLY DIFFICULT
GO UP STAIRS: ACTIVITY IS MINIMALLY DIFFICULT
STIFFNESS: THE SYMPTOM AFFECTS MY ACTIVITY MODERATELY
AS_A_RESULT_OF_YOUR_KNEE_INJURY,_HOW_WOULD_YOU_RATE_YOUR_CURRENT_LEVEL_OF_DAILY_ACTIVITY?: ABNORMAL
SIT WITH YOUR KNEE BENT: ACTIVITY IS NOT DIFFICULT
KNEEL ON THE FRONT OF YOUR KNEE: ACTIVITY IS FAIRLY DIFFICULT
GO DOWN STAIRS: ACTIVITY IS SOMEWHAT DIFFICULT
RISE FROM A CHAIR: ACTIVITY IS NOT DIFFICULT
GIVING WAY, BUCKLING OR SHIFTING OF KNEE: THE SYMPTOM AFFECTS MY ACTIVITY MODERATELY
KNEE_ACTIVITY_OF_DAILY_LIVING_SCORE: 60
HOW_WOULD_YOU_RATE_THE_OVERALL_FUNCTION_OF_YOUR_KNEE_DURING_YOUR_USUAL_DAILY_ACTIVITIES?: ABNORMAL

## 2018-08-21 NOTE — MR AVS SNAPSHOT
After Visit Summary   8/21/2018    Jocelin Hernandez    MRN: [id number]           Patient Information     Date Of Birth          1965        Visit Information        Provider Department      8/21/2018 4:00 PM [facility]        Today's Diagnoses     Aftercare    -  1       Follow-ups after your visit        Your next 10 appointments already scheduled     Nov 20, 2018  4:00 PM CST   GROUP with [facility]   [facility] [facility] ([facility])    [address]  [address]  [facility] [address]   [phone]            Dec 18, 2018  4:00 PM CST   GROUP with [facility]   [facility] ([facility])    [address]  [address]  [facility] [address]   [phone]            Aniceto 15, 2019  4:00 PM CST   GROUP with [facility]   [facility] ([facility])    [address]  [address]  [facility] [address]   [phone]            Feb 19, 2019  4:00 PM CST   GROUP with  [facility]   [facility] ([facility])    [address]  [address]  [facility] [address]   [phone]            Mar 19, 2019  4:00 PM CDT   [facility]   [facility] ([facility])    [address]  [address]  [facility] [address]   [phone]              Who to contact     Please call your clinic at [phone] to:    Ask questions about your health    Make or cancel appointments    Discuss your medicines    Learn about your test results    Speak to your doctor            Additional Information About Your Visit        [employer] Information     transOMICt gives you secure access to your electronic health record. If you see a primary care provider, you can also send messages to your care team and make appointments. If you have questions, please call your primary care clinic.  If you do not  have a primary care provider, please call [phone] and they will assist you.      Healthrageous is an electronic gateway that provides easy, online access to your medical records. With Healthrageous, you can request a clinic appointment, read your test results, renew a prescription or communicate with your care team.     To access your existing account, please contact your [facility] or call [phone] for assistance.        Care EveryWhere ID     This is your Care EveryWhere ID. This could be used by other organizations to access your [place] medical records  [id number]         Blood Pressure from Last 3 Encounters:   08/10/18 133/83   07/03/18 134/89   05/21/18 138/84    Weight from Last 3 Encounters:   09/12/18 78.5 kg (173 lb)   08/10/18 76.7 kg (169 lb)   07/03/18 77.6 kg (171 lb)              We Performed the Following     Support Group - 90 Min. [[phone]]        Primary Care Provider Office Phone # Fax #    Nola Nielson [phone]       [address] [place] [address]        Equal Access to Services     [facility]: Hadii aad ku hadasho Soerasmo, waaxda luqadaha, qaybta kaalmada adeegyada, soledad serrano . So [facility] [phone].    ATENCIÓN: Si habla español, tiene a lantigua disposición servicios gratuitos de asistencia lingüística. Llame al [phone].    We comply with applicable federal civil rights laws and [place] laws. We do not discriminate on the basis of race, color, national origin, age, disability, sex, sexual orientation, or gender identity.            Thank you!     Thank you for choosing [facility]  for your care. Our goal is always to provide you with excellent care. Hearing back from our patients is one way we can continue to improve our services. Please take a few minutes to complete the written survey that you may receive in the mail after your visit with us. Thank you!             Your  Updated Medication List - Protect others around you: Learn how to safely use, store and throw away your medicines at www.disposemymeds.org.          This list is accurate as of 8/21/18 11:59 PM.  Always use your most recent med list.                   Brand Name Dispense Instructions for use Diagnosis    acetaminophen 500 MG tablet    TYLENOL     Take 1,000 mg by mouth        albuterol 108 (90 Base) MCG/ACT inhaler    PROAIR HFA/PROVENTIL HFA/VENTOLIN HFA     Inhale 2 puffs into the lungs        * ARMOUR THYROID 15 MG Tabs tablet   Generic drug:  thyroid      TK 1 T PO D WITH A 30MG DOSE        * ARMOUR THYROID 30 MG tablet   Generic drug:  thyroid      TK 1 T PO D        atenolol 25 MG tablet    TENORMIN     Take 25 mg by mouth        buPROPion 150 MG 24 hr tablet    WELLBUTRIN XL     TK 1 T PO QAM        FIRST-PROGESTERONE VGS 25 VA           TESTOSTERONE 2 MG/GM CREAM      Apply topically 1 gram to inner thigh or periclitorial area nightly        * Notice:  This list has 2 medication(s) that are the same as other medications prescribed for you. Read the directions carefully, and ask your doctor or other care provider to review them with you.

## 2018-08-21 NOTE — PROGRESS NOTES
Subjective:  HPI                    Objective:  System    Physical Exam    General     ROS    Assessment/Plan:    PROGRESS  REPORT    Progress reporting period is from SOC to 8/21/2018.       SUBJECTIVE  Subjective changes noted by patient:  Patient reports reports persist knee pain since knee scope surgery in November 2017.  Patient c/o impaired walking, difficulty going up/down stairs, difficulty kneeling/squatting, and difficulty with transfers.   Knee feels better with rest.  She c/o swelling and sensation of instability.  Current pain level is 2/10 - 6/10.   Pain is intermittent.  Previous pain level was  Moderate to severe.   Changes in function:  Yes (See Goal flowsheet attached for changes in current functional level)  Adverse reaction to treatment or activity: None    OBJECTIVE  Gait: Protective gait pattern  PROM: WNL including full hyperextension  PROM: WNL  MMT: 5-/5 Ext, 5-/5 Ext, Hip Abd 5-/5  Mini joint effusion - chronic  Good Knee and Hip control with lateral step down (4 inch)        ASSESSMENT/PLAN  Updated problem list and treatment plan: Diagnosis 1:  Incomplete rehab follow knee scope  Pain -  self management, education and home program  Decreased strength - therapeutic exercise, therapeutic activities and home program  Impaired balance - neuro re-education, gait training, therapeutic activities and home program  Edema - self management/home program  Impaired gait - gait training and home program  Impaired muscle performance - neuro re-education and home program  Decreased function - therapeutic activities and home program  STG/LTGs have been met or progress has been made towards goals:  Yes (See Goal flow sheet completed today.)  Assessment of Progress: The patient's condition is improving.  Self Management Plans:  Patient has been instructed in a home treatment program.  Patient is independent in a home treatment program.  I have re-evaluated this patient and find that the nature, scope,  duration and intensity of the therapy is appropriate for the medical condition of the patient.  Jocelin continues to require the following intervention to meet STG and LTG's:  PT    Recommendations:  This patient would benefit from continued therapy.     Frequency:  1 X week, once daily  Duration:  for 6 weeks        Please refer to the daily flowsheet for treatment today, total treatment time and time spent performing 1:1 timed codes.

## 2018-09-04 DIAGNOSIS — R53.83 FATIGUE, UNSPECIFIED TYPE: Primary | ICD-10-CM

## 2018-09-07 NOTE — TELEPHONE ENCOUNTER
FUTURE VISIT INFORMATION      FUTURE VISIT INFORMATION:    Date: 9/12    Time: 1:20    Location:   REFERRAL INFORMATION:    Referring provider:  self    Referring providers clinic:      Reason for visit/diagnosis:   Left knee pain    RECORDS REQUESTED FROM:       Clinic name Comments Records Status Imaging Status   [facility] RECORDS IN CARE EVERYWHERE  Images in PAC                                   RECORDS STATUS

## 2018-09-12 ENCOUNTER — RADIANT APPOINTMENT (OUTPATIENT)
Dept: GENERAL RADIOLOGY | Facility: CLINIC | Age: 53
End: 2018-09-12
Attending: FAMILY MEDICINE
Payer: COMMERCIAL

## 2018-09-12 ENCOUNTER — PRE VISIT (OUTPATIENT)
Dept: ORTHOPEDICS | Facility: CLINIC | Age: 53
End: 2018-09-12

## 2018-09-12 ENCOUNTER — OFFICE VISIT (OUTPATIENT)
Dept: ORTHOPEDICS | Facility: CLINIC | Age: 53
End: 2018-09-12
Payer: COMMERCIAL

## 2018-09-12 VITALS — WEIGHT: 173 LBS | HEIGHT: 65 IN | RESPIRATION RATE: 16 BRPM | BODY MASS INDEX: 28.82 KG/M2

## 2018-09-12 DIAGNOSIS — M25.562 LEFT KNEE PAIN, UNSPECIFIED CHRONICITY: Primary | ICD-10-CM

## 2018-09-12 DIAGNOSIS — M25.562 LEFT KNEE PAIN, UNSPECIFIED CHRONICITY: ICD-10-CM

## 2018-09-12 NOTE — LETTER
"9/12/2018    RE: Jocelin Hernandez  [address] [facility] [address]      Subjective:   Jocelin Hernandez is a 53 year old female who presents with left knee pain. It is worse in the front and to the outside.     Original injury was walking and it came up suddenly. About 1 month later had a meniscus repair. She is not sure she was ever better after the surgery- still swollen and full for longer than she wanted.    If she rests and uses tylenol can get the pain to be down to a 1/10. If on feet, or change shoes other changes then she \"pays for it\" with swelling in knee and much increase in pain. Feels that her hips are getting out of alignment.     Other injuries: tore labrum left hip 8 years and had it repaired. No trouble since then.     Ice and compression did help. Cortisone shot helped but has worn off.     Sensation of giving out, walks stairs one step at time. Does start to give out but she can catch her self.     Background:   Aggravating factors: Walking, , notes catching/buckleing down steps, standing for long periods of time.  Relieving Factors: rest,tylenol   Prior Evaluation: Prior Physician Evalutation:  5/25/18, PT, Tria  S/p left knee arthroscopy, injection     PAST MEDICAL, SOCIAL, SURGICAL AND FAMILY HISTORY: She  has a past medical history of Hypertension; Hypothyroid; Palpitations; Pneumonia; and Shortness of breath. She also has no past medical history of Hyperlipidemia or Syncope.  She  has a past surgical history that includes knee surgery (Left).  Her family history includes Cerebrovascular Disease in her father; Lung Cancer in her mother; No Known Problems in her daughter, daughter, sister, sister, and son.  She reports that she has never smoked. She has never used smokeless tobacco. She reports that she drinks about 0.6 oz of alcohol per week  She reports that she does not use illicit drugs.    ALLERGIES: She is allergic to hydrocortisone and seasonal " "allergies.    CURRENT MEDICATIONS: She has a current medication list which includes the following prescription(s): diclofenac, progesterone, thyroid, thyroid, and acetaminophen.     REVIEW OF SYSTEMS: 6 point review of systems is negative except as noted above.     Exam:   Resp 16  Ht 5' 5\" (1.651 m)  Wt 173 lb (78.5 kg)  BMI 28.79 kg/m2           CONSTITUTIONAL: healthy, alert and no distress  HEAD: Normocephalic. No masses, lesions, tenderness or abnormalities  SKIN: no suspicious lesions or rashes  GAIT: normal  NEUROLOGIC: Non-focal  PSYCHIATRIC: affect normal/bright and mentation appears normal.    MUSCULOSKELETAL:   Left knee mild joint effusion, ttp at superior patella, nontender along joint line. Some pain reproduced with patellar grind          X-RAY INTERPRETATION:   X-Ray of the Right Knee: 3-view, AP, lateral, sunrise   ordered and interpreted in the office today was negative for fracture, showed some mild lateral patellar tracking appears worse on left. ?possible femoral medial shift. Mild degenerative changes along joint line         Assessment/Plan:       ICD-10-CM    1. Left knee pain, unspecified chronicity M25.562 X-ray Knee Left 3 Views     diclofenac (VOLTAREN) 1 % GEL topical gel     PHYSICAL THERAPY REFERRAL (Internal)     PHYSICAL THERAPY REFERRAL (External-Prints)       Patient Instructions   1. To decrease inflammation try ibuprofen 600mg 3 times a day with food scheduled for 2 weeks. If this is not working you can try the antiinflammatory gel I prescribed you    2. Pool therapy to begin will be helpful. [facility] or [facility] in [place] has this available    3. After you are improving from pool will try the land based therapy    4. Come back in 1 month to check back in. kimberly Esquivel    5. Check with insurance to see if hyaluronic acid shots are covered (synvisc or euflexxa are brand names)      Patient seen and discussed with Dr. Celeste Thorpe.    Blaine Esquivel MD  Sports " Medicine Fellow  9/12/2018 2:53 PM       Attending Note:   I have personally examined this patient and have reviewed the clinical presentation and progress note with the fellow. I agree with the treatment plan as outlined. The plan was formulated with the fellow on the day of the patient's visit. I have reviewed all imaging with the fellow and agree with the findings in the documentation.     Celeste Thorpe MD, CAQ, CCD  [facility]  Sports Medicine and Bone Health

## 2018-09-12 NOTE — PATIENT INSTRUCTIONS
1. To decrease inflammation try ibuprofen 600mg 3 times a day with food scheduled for 2 weeks. If this is not working you can try the antiinflammatory gel I prescribed you    2. Pool therapy to begin will be helpful. [facility] in [place] has this available    3. After you are improving from pool will try the land based therapy    4. Come back in 1 month to check back in. Alvin, or Maurilio    5. Check with insurance to see if hyaluronic acid shots are covered (synvisc or euflexxa are brand names)

## 2018-09-12 NOTE — MR AVS SNAPSHOT
After Visit Summary   9/12/2018    Jocelin Hernandez    MRN: [id number]           Patient Information     Date Of Birth          1965        Visit Information        Provider Department      9/12/2018 1:20 PM Celeste Thorpe MD [facility] Sports Medicine        Today's Diagnoses     Left knee pain, unspecified chronicity    -  1      Care Instructions    1. To decrease inflammation try ibuprofen 600mg 3 times a day with food scheduled for 2 weeks. If this is not working you can try the antiinflammatory gel I prescribed you    2. Pool therapy to begin will be helpful. [facility] or [facility] in [place] has this available    3. After you are improving from pool will try the land based therapy    4. Come back in 1 month to check back in. Alvin or Maurilio    5. Check with insurance to see if hyaluronic acid shots are covered (synvisc or euflexxa are brand names)           Follow-ups after your visit        Additional Services     PHYSICAL THERAPY REFERRAL (External-Prints)       Physical Therapy Referral- Pool therapy left knee pain            PHYSICAL THERAPY REFERRAL (Internal)       Physical Therapy Referral                  Your next 10 appointments already scheduled     Oct 10, 2018  4:00 PM CDT   [facility]   [facility] ([facility]    [address]  Mail [address] 7521  [facility] [address]   [phone]            Nov 14, 2018  4:00 PM CST   [facility] with [facility]   [facility] ([facility])    [address]  Mail [address] 7521  [facility] [address]   [phone]            Dec 12, 2018  4:00 PM CST   GROUP with [facility] AC [facility]   [facility] ([facility])    [address]  Mail [address] 7521  [facility] [address]   [phone]              Who to contact     Please call your clinic at [phone] to:    Ask questions about your health    Make or  "cancel appointments    Discuss your medicines    Learn about your test results    Speak to your doctor            Additional Information About Your Visit        [email or web address] Information     [employer] gives you secure access to your electronic health record. If you see a primary care provider, you can also send messages to your care team and make appointments. If you have questions, please call your primary care clinic.  If you do not have a primary care provider, please call [phone] and they will assist you.      [employer] is an electronic gateway that provides easy, online access to your medical records. With [employer], you can request a clinic appointment, read your test results, renew a prescription or communicate with your care team.     To access your existing account, please contact your [facility] or call [phone] for assistance.        Care EveryWhere ID     This is your Care EveryWhere ID. This could be used by other organizations to access your [facility] medical records  [id number]        Your Vitals Were     Respirations Height BMI (Body Mass Index)             16 5' 5\" (1.651 m) 28.79 kg/m2          Blood Pressure from Last 3 Encounters:   08/10/18 133/83   07/03/18 134/89   05/21/18 138/84    Weight from Last 3 Encounters:   09/12/18 173 lb (78.5 kg)   08/10/18 169 lb (76.7 kg)   07/03/18 171 lb (77.6 kg)              We Performed the Following     PHYSICAL THERAPY REFERRAL (External-Prints)     PHYSICAL THERAPY REFERRAL (Internal)          Today's Medication Changes          These changes are accurate as of 9/12/18  2:53 PM.  If you have any questions, ask your nurse or doctor.               Start taking these medicines.        Dose/Directions    diclofenac 1 % Gel topical gel   Commonly known as:  VOLTAREN   Used for:  Left knee pain, unspecified chronicity   Started by:  Celeste Thorpe MD        Dose:  2 g   Place 2 g onto the skin 4 times daily "   Quantity:  100 g   Refills:  6         Stop taking these medicines if you haven't already. Please contact your care team if you have questions.     albuterol 108 (90 Base) MCG/ACT inhaler   Commonly known as:  PROAIR HFA/PROVENTIL HFA/VENTOLIN HFA   Stopped by:  Celeste Thorpe MD           atenolol 25 MG tablet   Commonly known as:  TENORMIN   Stopped by:  Celeste Thorpe MD           buPROPion 150 MG 24 hr tablet   Commonly known as:  WELLBUTRIN XL   Stopped by:  Celeste Thorpe MD           FIRST-PROGESTERONE VGS 25 VA   Stopped by:  Celeste Thorpe MD           TESTOSTERONE 2 MG/GM CREAM   Stopped by:  Celeste Thorpe MD                Where to get your medicines      These medications were sent to [facility] [address] [address] AT SEC of [facility] [address]  [address] [facility] [address], [facility] [address]     Phone:  [phone]     diclofenac 1 % Gel topical gel                Primary Care Provider Office Phone # Fax #    Nola Nielson [phone]       [address] [address]        Equal Access to Services     ANITA AMOR : Hadii georgia mitchell hadasho Soomaali, waaxda luqadaha, qaybta kaalmada adeegyada, soledad serrano . So [facility] [phone].    ATENCIÓN: Si habla español, tiene a lantigua disposición servicios gratuitos de asistencia lingüística. Llame al [phone].    We comply with applicable federal civil rights laws and [place] laws. We do not discriminate on the basis of race, color, national origin, age, disability, sex, sexual orientation, or gender identity.            Thank you!     Thank you for choosing [facility]  for your care. Our goal is always to provide you with excellent care. Hearing back from our patients is one way we can continue to improve our services. Please take a few minutes to complete the written  survey that you may receive in the mail after your visit with us. Thank you!             Your Updated Medication List - Protect others around you: Learn how to safely use, store and throw away your medicines at www.disposemymeds.org.          This list is accurate as of 9/12/18  2:53 PM.  Always use your most recent med list.                   Brand Name Dispense Instructions for use Diagnosis    acetaminophen 500 MG tablet    TYLENOL     Take 1,000 mg by mouth        * ARMOUR THYROID 15 MG Tabs tablet   Generic drug:  thyroid      TK 1 T PO D WITH A 30MG DOSE        * ARMOUR THYROID 30 MG tablet   Generic drug:  thyroid      TK 1 T PO D        diclofenac 1 % Gel topical gel    VOLTAREN    100 g    Place 2 g onto the skin 4 times daily    Left knee pain, unspecified chronicity       progesterone 100 MG capsule    PROMETRIUM    30 capsule    Take 1 capsule (100 mg) by mouth daily    Night sweats       * Notice:  This list has 2 medication(s) that are the same as other medications prescribed for you. Read the directions carefully, and ask your doctor or other care provider to review them with you.

## 2018-09-12 NOTE — PROGRESS NOTES
" Subjective:   Jocelin Hernandez is a 53 year old female who presents with left knee pain. It is worse in the front and to the outside.     Original injury was walking and it came up suddenly. About 1 month later had a meniscus repair. She is not sure she was ever better after the surgery- still swollen and full for longer than she wanted.    If she rests and uses tylenol can get the pain to be down to a 1/10. If on feet, or change shoes other changes then she \"pays for it\" with swelling in knee and much increase in pain. Feels that her hips are getting out of alignment.     Other injuries: tore labrum left hip 8 years and had it repaired. No trouble since then.     Ice and compression did help. Cortisone shot helped but has worn off.     Sensation of giving out, walks stairs one step at time. Does start to give out but she can catch her self.     Background:   Aggravating factors: Walking, , notes catching/buckleing down steps, standing for long periods of time.  Relieving Factors: rest,tylenol   Prior Evaluation: Prior Physician Evalutation:  5/25/18, PT, Tria  S/p left knee arthroscopy, injection     PAST MEDICAL, SOCIAL, SURGICAL AND FAMILY HISTORY: She  has a past medical history of Hypertension; Hypothyroid; Palpitations; Pneumonia; and Shortness of breath. She also has no past medical history of Hyperlipidemia or Syncope.  She  has a past surgical history that includes knee surgery (Left).  Her family history includes Cerebrovascular Disease in her father; Lung Cancer in her mother; No Known Problems in her daughter, daughter, sister, sister, and son.  She reports that she has never smoked. She has never used smokeless tobacco. She reports that she drinks about 0.6 oz of alcohol per week  She reports that she does not use illicit drugs.    ALLERGIES: She is allergic to hydrocortisone and seasonal allergies.    CURRENT MEDICATIONS: She has a current medication list which includes the following " "prescription(s): diclofenac, progesterone, thyroid, thyroid, and acetaminophen.     REVIEW OF SYSTEMS: 6 point review of systems is negative except as noted above.     Exam:   Resp 16  Ht 5' 5\" (1.651 m)  Wt 173 lb (78.5 kg)  BMI 28.79 kg/m2           CONSTITUTIONAL: healthy, alert and no distress  HEAD: Normocephalic. No masses, lesions, tenderness or abnormalities  SKIN: no suspicious lesions or rashes  GAIT: normal  NEUROLOGIC: Non-focal  PSYCHIATRIC: affect normal/bright and mentation appears normal.    MUSCULOSKELETAL:   Left knee mild joint effusion, ttp at superior patella, nontender along joint line. Some pain reproduced with patellar grind          X-RAY INTERPRETATION:   X-Ray of the Right Knee: 3-view, AP, lateral, sunrise   ordered and interpreted in the office today was negative for fracture, showed some mild lateral patellar tracking appears worse on left. ?possible femoral medial shift. Mild degenerative changes along joint line         Assessment/Plan:       ICD-10-CM    1. Left knee pain, unspecified chronicity M25.562 X-ray Knee Left 3 Views     diclofenac (VOLTAREN) 1 % GEL topical gel     PHYSICAL THERAPY REFERRAL (Internal)     PHYSICAL THERAPY REFERRAL (External-Prints)       Patient Instructions   1. To decrease inflammation try ibuprofen 600mg 3 times a day with food scheduled for 2 weeks. If this is not working you can try the antiinflammatory gel I prescribed you    2. Pool therapy to begin will be helpful. [facility] or [facility] in [place] has this available    3. After you are improving from pool will try the land based therapy    4. Come back in 1 month to check back in. kimberly Esquivel    5. Check with insurance to see if hyaluronic acid shots are covered (synvisc or euflexxa are brand names)      Patient seen and discussed with Dr. Celeste Thorpe.    Blaine Esquivel MD  Sports Medicine Fellow  9/12/2018 2:53 PM     "

## 2018-09-18 ENCOUNTER — OFFICE VISIT (OUTPATIENT)
Dept: OTHER | Facility: OUTPATIENT CENTER | Age: 53
End: 2018-09-18

## 2018-09-18 DIAGNOSIS — Z51.89 AFTERCARE: Primary | ICD-10-CM

## 2018-09-18 NOTE — MR AVS SNAPSHOT
After Visit Summary   9/18/2018    Jocelin Hernandez    MRN: [id number]           Patient Information     Date Of Birth          1965        Visit Information        Provider Department      9[facility]18/2018 4:00 [facility] [place] [facility]        Today's Diagnoses     Aftercare    -  1       Follow-ups after your visit        Your next 10 appointments already scheduled     Nov 20, 2018  4:00 PM CST   [facility] ([facility])    [address]  Mail Code 7521  [facility] [address]   [phone]            Dec 18, 2018  4:00 PM CST   [facility]   [facility] ([facility])    [address]  Mail Code 7521  [facility] [address]   [phone]            Aniceto 15, 2019  4:00 PM CST   [facility]   [facility] ([facility])    [address]  Mail Code 7521  [facility] [address]   [phone]            Feb 19, 2019  4:00 PM CST   [facility] ([facility])    [address]  Mail Code 7521  [facility] [address]   [phone]            Mar 19, 2019  4:00 PM CDT   [facility]   [facility] ([facility])    [address]  Mail [address] 7521  [facility] [address]   [phone]              Who to contact     Please call your clinic at [phone] to:    Ask questions about your health    Make or cancel appointments    Discuss your medicines    Learn about your test results    Speak to your doctor            Additional Information About Your Visit        [employer] Information     [employer] gives you secure access to your electronic health record. If you see a primary care provider, you can also send messages to your care team and make appointments. If you have questions, please call your primary care clinic.  If you do not have a  primary care provider, please call [phone] and they will assist you.      [employer] is an electronic gateway that provides easy, online access to your medical records. With [employer], you can request a clinic appointment, read your test results, renew a prescription or communicate with your care team.     To access your existing account, please contact your [facility] or call [phone] for assistance.        Care EveryWhere ID     This is your Care EveryWhere ID. This could be used by other organizations to access your [facility] medical records  [id number]         Blood Pressure from Last 3 Encounters:   08/10/18 133/83   07/03/18 134/89   05/21/18 138/84    Weight from Last 3 Encounters:   09/12/18 78.5 kg (173 lb)   08/10/18 76.7 kg (169 lb)   07/03/18 77.6 kg (171 lb)              We Performed the Following     Support Group - 90 Min. [[phone]]        Primary Care Provider Office Phone # Fax #    Nola Nielson [phone]       [address] [place] [address]        Equal Access to Services     [facility]: Hadii aad ku hadasho Soomaali, waaxda luqadaha, qaybta kaalmada adeegyada, soledad serrano . So [facility] [phone].    ATENCIÓN: Si habla español, tiene a lantigua disposición servicios gratuitos de asistencia lingüística. Llame al [phone].    We comply with applicable federal civil rights laws and [place] laws. We do not discriminate on the basis of race, color, national origin, age, disability, sex, sexual orientation, or gender identity.            Thank you!     Thank you for choosing [facility]  for your care. Our goal is always to provide you with excellent care. Hearing back from our patients is one way we can continue to improve our services. Please take a few minutes to complete the written survey that you may receive in the mail after your visit with us. Thank you!             Your Updated  Medication List - Protect others around you: Learn how to safely use, store and throw away your medicines at www.disposemymeds.org.          This list is accurate as of 9/18/18 11:59 PM.  Always use your most recent med list.                   Brand Name Dispense Instructions for use Diagnosis    acetaminophen 500 MG tablet    TYLENOL     Take 1,000 mg by mouth        * ARMOUR THYROID 15 MG Tabs tablet   Generic drug:  thyroid      TK 1 T PO D WITH A 30MG DOSE        * ARMOUR THYROID 30 MG tablet   Generic drug:  thyroid      TK 1 T PO D        diclofenac 1 % Gel topical gel    VOLTAREN    100 g    Place 2 g onto the skin 4 times daily    Left knee pain, unspecified chronicity       * Notice:  This list has 2 medication(s) that are the same as other medications prescribed for you. Read the directions carefully, and ask your doctor or other care provider to review them with you.

## 2018-09-19 DIAGNOSIS — R53.83 FATIGUE, UNSPECIFIED TYPE: ICD-10-CM

## 2018-09-19 LAB — T3FREE SERPL-MCNC: 3.4 PG/ML (ref 2.3–4.2)

## 2018-09-19 PROCEDURE — 84481 FREE ASSAY (FT-3): CPT | Performed by: FAMILY MEDICINE

## 2018-09-19 PROCEDURE — 80048 BASIC METABOLIC PNL TOTAL CA: CPT | Performed by: FAMILY MEDICINE

## 2018-09-19 PROCEDURE — 84439 ASSAY OF FREE THYROXINE: CPT | Performed by: FAMILY MEDICINE

## 2018-09-19 PROCEDURE — 82627 DEHYDROEPIANDROSTERONE: CPT | Performed by: FAMILY MEDICINE

## 2018-09-19 PROCEDURE — 36415 COLL VENOUS BLD VENIPUNCTURE: CPT | Performed by: FAMILY MEDICINE

## 2018-09-19 PROCEDURE — 84443 ASSAY THYROID STIM HORMONE: CPT | Performed by: FAMILY MEDICINE

## 2018-09-20 DIAGNOSIS — R61 NIGHT SWEATS: ICD-10-CM

## 2018-09-20 LAB — DHEA-S SERPL-MCNC: 100 UG/DL (ref 35–430)

## 2018-09-20 NOTE — TELEPHONE ENCOUNTER
Last visit 8/10/18  Prescription approved per [facility] Refill Protocol.  Migdalia Tucker RN  Care Coordinator  [facility]

## 2018-09-21 NOTE — PROGRESS NOTES
Attending Note:   I have personally examined this patient and have reviewed the clinical presentation and progress note with the fellow. I agree with the treatment plan as outlined. The plan was formulated with the fellow on the day of the patient's visit. I have reviewed all imaging with the fellow and agree with the findings in the documentation.     Celeste Thorpe MD, CAQ, CCD  [facility]  Sports Medicine and Bone Health

## 2018-09-22 LAB
ANION GAP SERPL CALCULATED.3IONS-SCNC: 6 MMOL/L (ref 3–14)
BUN SERPL-MCNC: 17 MG/DL (ref 7–30)
CALCIUM SERPL-MCNC: 8.6 MG/DL (ref 8.5–10.1)
CHLORIDE SERPL-SCNC: 104 MMOL/L (ref 94–109)
CO2 SERPL-SCNC: 29 MMOL/L (ref 20–32)
CREAT SERPL-MCNC: 0.71 MG/DL (ref 0.52–1.04)
GFR SERPL CREATININE-BSD FRML MDRD: 85 ML/MIN/1.7M2
GLUCOSE SERPL-MCNC: 89 MG/DL (ref 70–99)
POTASSIUM SERPL-SCNC: 3.8 MMOL/L (ref 3.4–5.3)
SODIUM SERPL-SCNC: 139 MMOL/L (ref 133–144)
T4 FREE SERPL-MCNC: 0.81 NG/DL (ref 0.76–1.46)
TSH SERPL DL<=0.005 MIU/L-ACNC: 1.75 MU/L (ref 0.4–4)

## 2018-10-16 DIAGNOSIS — E03.9 ACQUIRED HYPOTHYROIDISM: Primary | ICD-10-CM

## 2018-10-19 NOTE — PROGRESS NOTES
Program in [facility] Sexuality  [facility]  [address], [address]  [place]  [address]    Group Progress Note    Date of Service: 9/18/18  Client Name: Jocelin Hernandez  YOB: 1965   MRN: [id number]   Number of Minutes: 60  Therapist(s): Kisha Infante      Current Symptoms/Status:  Distress related to partner's struggles with CSB     Progress Toward Treatment Goals:  Attends monthly support group.    Intervention - Modality and Description:  Supportive techniques.     Response to Intervention:  Client shared updates around separation with  and challenges with adhering to boundaries in relationship/ambivalence with steps to voice her needs.     Assignment:  None     Interactive Complexity:  none     Diagnosis:  Z51.89 After care        Plan/Need for Future Services:  Continue aftercare group    Amanda Pablo PsyD  Postdoctoral Fellow

## 2018-10-20 NOTE — PROGRESS NOTES
"I did not personally attend the group therapy session.  I reviewed and agree with the assessment and plan as documented in this note.     Ingrid \"Lyndon\" BENJAMIN Garcia, Ph.D.,   Clinical/Medical Director, [place] Licensed Psychologist  [place] Licensed Marriage & Family Therapist & State Approved Supervisor  "

## 2018-10-22 NOTE — PROGRESS NOTES
Program in Human Sexuality  [facility]  [address], [address]  [place]  [address]    Group Progress Note    Date of Service: 8/21/18  Client Name: Jocelin Hernandez  YOB: 1965   MRN: [id number]     Therapist(s): Cathy Davis, PhD LP (covering for Amanda)     Current Symptoms/Status:  Distress related to partner's struggles with CSB     Progress Toward Treatment Goals:  Attends monthly support group.    Intervention - Modality and Description:  Supportive techniques.     Response to Intervention:  Client shared that she continues to move forward with separation from  and the difficulties with this.  Discussed how self-care is more important than ever at this time and offered opportunities for this.      Assignment:  None     Interactive Complexity:  none     Diagnosis:  Z51.89 After care        Plan/Need for Future Services:  Continue aftercare group    Cathy Davis, PhD  Licensed Psychologist     \

## 2018-11-01 ENCOUNTER — TRANSFERRED RECORDS (OUTPATIENT)
Dept: HEALTH INFORMATION MANAGEMENT | Facility: CLINIC | Age: 53
End: 2018-11-01

## 2018-11-02 ENCOUNTER — OFFICE VISIT (OUTPATIENT)
Dept: ORTHOPEDICS | Facility: CLINIC | Age: 53
End: 2018-11-02
Payer: COMMERCIAL

## 2018-11-02 VITALS
HEART RATE: 88 BPM | BODY MASS INDEX: 28.82 KG/M2 | WEIGHT: 173 LBS | DIASTOLIC BLOOD PRESSURE: 103 MMHG | SYSTOLIC BLOOD PRESSURE: 167 MMHG | HEIGHT: 65 IN

## 2018-11-02 DIAGNOSIS — G89.29 CHRONIC PAIN OF LEFT KNEE: ICD-10-CM

## 2018-11-02 DIAGNOSIS — M25.562 CHRONIC PAIN OF LEFT KNEE: ICD-10-CM

## 2018-11-02 DIAGNOSIS — M76.32 ILIOTIBIAL BAND SYNDROME AFFECTING LEFT LOWER LEG: Primary | ICD-10-CM

## 2018-11-02 RX ADMIN — LIDOCAINE HYDROCHLORIDE 4 ML: 10 INJECTION, SOLUTION INFILTRATION; PERINEURAL at 12:40

## 2018-11-02 RX ADMIN — TRIAMCINOLONE ACETONIDE 40 MG: 40 INJECTION, SUSPENSION INTRA-ARTICULAR; INTRAMUSCULAR at 12:40

## 2018-11-02 NOTE — MR AVS SNAPSHOT
After Visit Summary   11/2/2018    Jocelin Hernandez    MRN: [id number]           Patient Information     Date Of Birth          1965        Visit Information        Provider Department      11/2/2018 11:50 AM Singh Jones DO M [facility]        Today's Diagnoses     Iliotibial band syndrome affecting left lower leg    -  1    Chronic pain of left knee           Follow-ups after your visit        Additional Services     PHYSICAL THERAPY REFERRAL (Internal)       Physical Therapy Referral - HEP, core/pelvifemoral/quad strengthening, dry needling, HEP.                  Your next 10 appointments already scheduled     Nov 20, 2018  4:00 PM CST   GROUP with [facility]   [place] ([facility])    [address]  Mail [address] 7521  [facility] [address]   [phone]            Dec 18, 2018  4:00 PM CST   GROUP with [facility] ([facility])    [address]  Mail [address]21  [facility] [address]   [phone]            Aniceto 15, 2019  4:00 PM CST   GROUP with [facility]   [facility] ([facility])    [address]  Mail [address] 7521  [facility] [address]   [phone]            Feb 19, 2019  4:00 PM CST   GROUP with [facility]   [facility] ([facility])    [address]  [address]  [facility] [address]   [phone]            Mar 19, 2019  4:00 PM CDT   GROUP with [facility] ([facility])    [address]  [address]  [facility] [address]   [phone]              Who to contact     Please call your clinic at [phone] to:    Ask questions about your health    Make or cancel appointments    Discuss your medicines    Learn about your test results    Speak to your doctor            Additional Information About Your Visit        Jordan  "Information     [employer] gives you secure access to your electronic health record. If you see a primary care provider, you can also send messages to your care team and make appointments. If you have questions, please call your primary care clinic.  If you do not have a primary care provider, please call [phone] and they will assist you.      [employer] is an electronic gateway that provides easy, online access to your medical records. With [employer], you can request a clinic appointment, read your test results, renew a prescription or communicate with your care team.     To access your existing account, please contact your [facility] or call [phone] for assistance.        Care EveryWhere ID     This is your Care EveryWhere ID. This could be used by other organizations to access your [place] medical records  [id number]        Your Vitals Were     Pulse Height BMI (Body Mass Index)             88 1.651 m (5' 5\") 28.79 kg/m2          Blood Pressure from Last 3 Encounters:   11/02/18 (!) 167/103   08/10/18 133/83   07/03/18 134/89    Weight from Last 3 Encounters:   11/02/18 78.5 kg (173 lb)   09/12/18 78.5 kg (173 lb)   08/10/18 76.7 kg (169 lb)              We Performed the Following     Large Joint Injection/Arthocentesis        Primary Care Provider Office Phone # Fax #    Nola Nielson [phone]       [address] [facility] [address]  [facility] [address]        Equal Access to Services     CHARLES AMOR : Hadii georgia ku hadasho Soomaali, waaxda luqadaha, qaybta kaalmada adeegyada, soledad serrano . So [facility] [phone].    ATENCIÓN: Si habla bety, tiene a lantigua disposición servicios gratuitos de asistencia lingüística. Llame al [phone].    We comply with applicable federal civil rights laws and [place] laws. We do not discriminate on the basis of race, color, national origin, age, disability, sex, sexual orientation, or gender " identity.            Thank you!     Thank you for choosing [facility]THOP[facility] CLINIC  for your care. Our goal is always to provide you with excellent care. Hearing back from our patients is one way we can continue to improve our services. Please take a few minutes to complete the written survey that you may receive in the mail after your visit with us. Thank you!             Your Updated Medication List - Protect others around you: Learn how to safely use, store and throw away your medicines at www.disposemymeds.org.          This list is accurate as of 11/2/18 11:59 PM.  Always use your most recent med list.                   Brand Name Dispense Instructions for use Diagnosis    acetaminophen 500 MG tablet    TYLENOL     Take 1,000 mg by mouth        * ARMOUR THYROID 15 MG Tabs tablet   Generic drug:  thyroid      TK 1 T PO D WITH A 30MG DOSE        * ARMOUR THYROID 30 MG tablet   Generic drug:  thyroid      TK 1 T PO D        diclofenac 1 % Gel topical gel    VOLTAREN    100 g    Place 2 g onto the skin 4 times daily    Left knee pain, unspecified chronicity       progesterone 100 MG capsule    PROMETRIUM    30 capsule    TAKE ONE CAPSULE BY MOUTH EVERY DAY    Night sweats       * Notice:  This list has 2 medication(s) that are the same as other medications prescribed for you. Read the directions carefully, and ask your doctor or other care provider to review them with you.

## 2018-11-02 NOTE — PROGRESS NOTES
Large Joint Injection/Arthocentesis  Date/Time: 11/2/2018 12:40 PM  Performed by: SARITHA LOCKWOOD  Authorized by: SARITHA LOCKWOOD     Indications:  Pain  Needle Size:  22 G  Location:  Knee  Site:  L knee joint  Medications:  40 mg triamcinolone acetonide 40 MG/ML; 4 mL lidocaine 1 %  Medications comment:  1mL of kenalog and 4mL of lidocaine injected into knee joint. 3mL of lidocaine used for numbing solution  Outcome:  Tolerated well, no immediate complications  Procedure discussed: discussed risks, benefits, and alternatives    Consent Given by:  Patient  Timeout: timeout called immediately prior to procedure    Prep: patient was prepped and draped in usual sterile fashion

## 2018-11-02 NOTE — LETTER
11/2/2018     RE: Jocelin Hernandez  [address]  [facility] [address]     Dear Colleague,    Thank you for referring your patient, Jocelin Hernandez, to the [facility] SPORTS AND ORTHOPAEDIC WALK IN CLINIC at [facility]. Please see a copy of my visit note below.          SPORTS & ORTHOPEDIC WALK-IN VISIT 11/2/2018    Primary Care Physician: Dr. Nielson    Would like cortisone injection, possible aspiration as well. Had seen Dr. Thorpe 9/12. Was getting acupuncture because PT would make it worse. Not sure if she overused it but recently it's gotten worse and she's having a lot of trouble walking. Feeling some ITB tightness, causing her toes to turn inward. Has been getting progressively tighter. Feels bakers cyst has gotten worse. Can't pinpoint pain. Throbbing ache everywhere with walking yesterday. Right now not so bad due to increased rest. Injected by Dr. Rooney 5/25. Also reports patellar maltracking.     Reason for visit:     What part of your body is injured / painful?  left knee    What caused the injury /pain? No inciting event     How long ago did your injury occur or pain begin? Over a year    What are your most bothersome symptoms? Pain and Swelling, giving out    How would you characterize your symptom?  aching, sharp and throbing    What makes your symptoms better? Rest and Tylenol, cortisone injection    What makes your symptoms worse? Standing and Walking    Have you been previously seen for this problem? Yes, Vikash Thorpe    Medical History:    Any recent changes to your medical history? No    Any new medication prescribed since last visit? No    Have you had surgery on this body part before? Yes mensicus repair    Social History:    Occupation:     Handedness: Right    Exercise: None    Review of Systems:    Do you have fever, chills, weight loss? No    Do you have any vision problems? No    Do you have any chest pain or edema? No    Do you have any shortness  of breath or wheezing?  No    Do you have stomach problems? No    Do you have any numbness or focal weakness? No    Do you have diabetes? No    Do you have problems with bleeding or clotting? No    Do you have an rashes or other skin lesions? No           [facility]  Orthopedics  Singh Jones DO  2018     Name: Jocelin Hernandez  MRN: [id number]  Age: 53 year old  : 1965  Referring provider: Referred Self     Chief Complaint:   Left knee pain     History of Present Illness:   Jocelin Hernandez is a 53 year old female who presents today for evaluation of left knee pain. The patient has chronic left knee pain and is status-post left knee meniscal repair done at [facility]. Of note, MRI of the left knee on 10/15/2017 was remarkable for medial and lateral meniscus tears as well as a full-thickness fissure in the medial patellar facet. She obtained a left menisectomy near the end of 2017. She has had good range of motion since but the pain never resolved. She presented to see Dr. Rooney on 18 and obtained left knee aspiration and injection. There was also noted to be a baker's cyst. This did well to improve her pain throughout the summer. Treatment also included physical therapy, though this was very painful for the knee after each session so she discontinue this. She presented to see Dr. Thorpe on 18, at which time she had x-ray performed that was significant for mild degenerative changes. She was recommended to resume physical therapy, however, her pain was to severe. She has been doing acupuncture and rest.     Recently, her knee pain was exacerbated with moving, bringing boxes up and down stairs. She states that her pain is diffuse and difficult to localize. There is mild swelling and it feels full in the popliteal aspect. The pain has been throbbing and achy and better with rest. She has also had severe left IT band tightness and pain.Today, she is here for possible injection/aspiration.  "    Review of Systems:   A 10-point review of systems was obtained and is negative except for as noted in the HPI.     Medications:     Current Outpatient Prescriptions:      acetaminophen (TYLENOL) 500 MG tablet, Take 1,000 mg by mouth, Disp: , Rfl:      diclofenac (VOLTAREN) 1 % GEL topical gel, Place 2 g onto the skin 4 times daily, Disp: 100 g, Rfl: 6     progesterone (PROMETRIUM) 100 MG capsule, TAKE ONE CAPSULE BY MOUTH EVERY DAY, Disp: 30 capsule, Rfl: 1     thyroid (ARMOUR THYROID) 15 MG TABS tablet, TK 1 T PO D WITH A 30MG DOSE, Disp: , Rfl:      thyroid (ARMOUR THYROID) 30 MG tablet, TK 1 T PO D, Disp: , Rfl:     Allergies:  Hydrocortisone     Past Medical History:  Hypertension   Hypothyroidism   Palpitations   Pneumonia     Past Surgical History:  Left knee surgery      Social History:  Presents alone   Tobacco use: Never smoker   Alcohol consumption: 1 glass wine per week   Marital status:     PCP: Nola Nielson       Family History:  Mother - lung cancer   Father - cerebrovascular disease     Physical Examination:  Blood pressure (!) 167/103, pulse 88, height 1.651 m (5' 5\"), weight 78.5 kg (173 lb).    General  - normal appearance, in no obvious distress  CV  - normal popliteal pulse  Pulm  - normal respiratory pattern, non-labored  Musculoskeletal - knee  - stance: antalgic gait favoring the left lower extremity, obvious leg length discrepancy  - inspection: normal bone and joint alignment, no obvious deformity. Mild generalized swelling, mild left knee effusion,   - palpation: no joint line tenderness, patella and patellar tendon non-tender, no tenderness above the knee cap, discomfort of medial and lateral patellar facets, tenderness at the patellar tendon, lateral and medial joint line tenderness, fullness at the lateral aspect of the popliteal fossa with no tenderness, IT band tenderness from mid femur distally to insertion at the fibular head as well as Gerdy's tubercle.   - ROM: " discomfort 135 degrees flexion, -5 degrees extension, not painful, normal actively and passively compared to contralateral  - strength: 5/5 in flexion, 5/5 in extension  - special tests:  (-) Lachman  (-) anterior drawer  (-) posterior drawer  (-) pivot shift  (-) Roldan  (-) Thessaly  (-) varus at 0 and 30 degrees flexion  (-) valgus at 0 and 30 degrees flexion  (-) Hossein s compression test  (-) patellar apprehension  Neuro  - no sensory or motor deficit, grossly normal coordination, normal muscle tone  Skin  - no ecchymosis, erythema, warmth, or induration, no obvious rash  Psych  - interactive, appropriate, normal mood and affect    Imaging:   Deferred today     Assessment:   53 year old, right handed female status-post menisectomy on 11/2017 as well as corticosteroid injection of May 2018 presenting with acute on chronic knee pain.  Possibly meniscal pathology vs. Patellofemoral chondral wear progression.  I agreed to consider one additional injection today to settle her knee pain.  However if this persists after PT we may have to investigate with additional imaging.      Diagnosis:   Chronic left knee pain  IT band syndrome       Plan:   4. Left knee cortisone injection today  5. Voltaren gel for both the knee and IT band.   6. Formal physical therapy for IT band - Pool therapy would be excellent as discussed with Dr. Esquivel.  7. Follow-up 4-6 weeks    Procedure:   Left Knee Injection - Intraarticular  The patient was informed of the risks and the benefits of the procedure and a written consent was signed.  The patient s left knee was prepped with chlorhexidine in sterile fashion.   40 mg of triamcinolone suspension was drawn up into a 5 mL syringe with 4 mL of 1% lidocaine.  Injection was performed using substerile technique.  A 1.5-inch 22-gauge needle was used to enter the lateral aspect of the left knee.  Injection performed successfully without difficulty.  There were no complications. The patient  tolerated the procedure well. There was negligible bleeding.   The patient was instructed to ice the knee upon leaving clinic and refrain from overuse over the next 3 days.   The patient was instructed to call or go to the emergency room with any unusual pain, swelling, redness, or if otherwise concerned.  A follow up appointment will be scheduled to evaluate response to the injection, and to assess range of motion and pain.    Singh BEGUM DO, have reviewed the above note and agree with the scribe's notation as written.    Scribe Disclosure:   Freddy BEGUM, am serving as a scribe to document services personally performed by Singh Lockwood DO at this visit, based upon the provider's statements to me. All documentation has been reviewed by the aforementioned provider prior to being entered into the official medical record.     Large Joint Injection/Arthocentesis  Date/Time: 11/2/2018 12:40 PM  Performed by: SINGH LOCKWOOD  Authorized by: SINGH LOCKWOOD     Indications:  Pain  Needle Size:  22 G  Location:  Knee  Site:  L knee joint  Medications:  40 mg triamcinolone acetonide 40 MG/ML; 4 mL lidocaine 1 %  Medications comment:  1mL of kenalog and 4mL of lidocaine injected into knee joint. 3mL of lidocaine used for numbing solution  Outcome:  Tolerated well, no immediate complications  Procedure discussed: discussed risks, benefits, and alternatives    Consent Given by:  Patient  Timeout: timeout called immediately prior to procedure    Prep: patient was prepped and draped in usual sterile fashion      Again, thank you for allowing me to participate in the care of your patient.      Sincerely,    Singh Lockwood DO

## 2018-11-02 NOTE — PROGRESS NOTES
[facility]  Orthopedics  Singh Jones,   2018     Name: Jocelin Hernandez  MRN: [id number]  Age: 53 year old  : 1965  Referring provider: Referred Self     Chief Complaint:   Left knee pain     History of Present Illness:   Jocelin Hernandez is a 53 year old female who presents today for evaluation of left knee pain. The patient has chronic left knee pain and is status-post left knee meniscal repair done at [facility]. Of note, MRI of the left knee on 10/15/2017 was remarkable for medial and lateral meniscus tears as well as a full-thickness fissure in the medial patellar facet. She obtained a left menisectomy near the end of 2017. She has had good range of motion since but the pain never resolved. She presented to see Dr. Rooney on 18 and obtained left knee aspiration and injection. There was also noted to be a baker's cyst. This did well to improve her pain throughout the summer. Treatment also included physical therapy, though this was very painful for the knee after each session so she discontinue this. She presented to see Dr. Thorpe on 18, at which time she had x-ray performed that was significant for mild degenerative changes. She was recommended to resume physical therapy, however, her pain was to severe. She has been doing acupuncture and rest.     Recently, her knee pain was exacerbated with moving, bringing boxes up and down stairs. She states that her pain is diffuse and difficult to localize. There is mild swelling and it feels full in the popliteal aspect. The pain has been throbbing and achy and better with rest. She has also had severe left IT band tightness and pain.Today, she is here for possible injection/aspiration.     Review of Systems:   A 10-point review of systems was obtained and is negative except for as noted in the HPI.     Medications:     Current Outpatient Prescriptions:      acetaminophen (TYLENOL) 500 MG tablet, Take 1,000 mg by mouth, Disp: , Rfl:       "diclofenac (VOLTAREN) 1 % GEL topical gel, Place 2 g onto the skin 4 times daily, Disp: 100 g, Rfl: 6     progesterone (PROMETRIUM) 100 MG capsule, TAKE ONE CAPSULE BY MOUTH EVERY DAY, Disp: 30 capsule, Rfl: 1     thyroid (ARMOUR THYROID) 15 MG TABS tablet, TK 1 T PO D WITH A 30MG DOSE, Disp: , Rfl:      thyroid (ARMOUR THYROID) 30 MG tablet, TK 1 T PO D, Disp: , Rfl:     Allergies:  Hydrocortisone     Past Medical History:  Hypertension   Hypothyroidism   Palpitations   Pneumonia     Past Surgical History:  Left knee surgery      Social History:  Presents alone   Tobacco use: Never smoker   Alcohol consumption: 1 glass wine per week   Marital status:     PCP: Nola Nielson       Family History:  Mother - lung cancer   Father - cerebrovascular disease     Physical Examination:  Blood pressure (!) 167/103, pulse 88, height 1.651 m (5' 5\"), weight 78.5 kg (173 lb).    General  - normal appearance, in no obvious distress  CV  - normal popliteal pulse  Pulm  - normal respiratory pattern, non-labored  Musculoskeletal - knee  - stance: antalgic gait favoring the left lower extremity, obvious leg length discrepancy  - inspection: normal bone and joint alignment, no obvious deformity. Mild generalized swelling, mild left knee effusion,   - palpation: no joint line tenderness, patella and patellar tendon non-tender, no tenderness above the knee cap, discomfort of medial and lateral patellar facets, tenderness at the patellar tendon, lateral and medial joint line tenderness, fullness at the lateral aspect of the popliteal fossa with no tenderness, IT band tenderness from mid femur distally to insertion at the fibular head as well as Gerdy's tubercle.   - ROM: discomfort 135 degrees flexion, -5 degrees extension, not painful, normal actively and passively compared to contralateral  - strength: 5/5 in flexion, 5/5 in extension  - special tests:  (-) Lachman  (-) anterior drawer  (-) posterior drawer  (-) pivot " shift  (-) Roldan  (-) Thessaly  (-) varus at 0 and 30 degrees flexion  (-) valgus at 0 and 30 degrees flexion  (-) Hossein s compression test  (-) patellar apprehension  Neuro  - no sensory or motor deficit, grossly normal coordination, normal muscle tone  Skin  - no ecchymosis, erythema, warmth, or induration, no obvious rash  Psych  - interactive, appropriate, normal mood and affect    Imaging:   Deferred today     Assessment:   53 year old, right handed female status-post menisectomy on 11/2017 as well as corticosteroid injection of May 2018 presenting with acute on chronic knee pain.  Possibly meniscal pathology vs. Patellofemoral chondral wear progression.  I agreed to consider one additional injection today to settle her knee pain.  However if this persists after PT we may have to investigate with additional imaging.      Diagnosis:   Chronic left knee pain  IT band syndrome       Plan:   1. Left knee cortisone injection today  2. Voltaren gel for both the knee and IT band.   3. Formal physical therapy for IT band - Pool therapy would be excellent as discussed with Dr. Esquivel.  4. Follow-up 4-6 weeks    Procedure:   Left Knee Injection - Intraarticular  The patient was informed of the risks and the benefits of the procedure and a written consent was signed.  The patient s left knee was prepped with chlorhexidine in sterile fashion.   40 mg of triamcinolone suspension was drawn up into a 5 mL syringe with 4 mL of 1% lidocaine.  Injection was performed using substerile technique.  A 1.5-inch 22-gauge needle was used to enter the lateral aspect of the left knee.  Injection performed successfully without difficulty.  There were no complications. The patient tolerated the procedure well. There was negligible bleeding.   The patient was instructed to ice the knee upon leaving clinic and refrain from overuse over the next 3 days.   The patient was instructed to call or go to the emergency room with any unusual  pain, swelling, redness, or if otherwise concerned.  A follow up appointment will be scheduled to evaluate response to the injection, and to assess range of motion and pain.    ISingh DO, have reviewed the above note and agree with the scribe's notation as written.    Scribe Disclosure:   Freddy BEGUM, am serving as a scribe to document services personally performed by Singh Jones DO at this visit, based upon the provider's statements to me. All documentation has been reviewed by the aforementioned provider prior to being entered into the official medical record.

## 2018-11-02 NOTE — PROGRESS NOTES
SPORTS & ORTHOPEDIC WALK-IN VISIT 11/2/2018    Primary Care Physician: Dr. Nielson    Would like cortisone injection, possible aspiration as well. Had seen Dr. Thorpe 9/12. Was getting acupuncture because PT would make it worse. Not sure if she overused it but recently it's gotten worse and she's having a lot of trouble walking. Feeling some ITB tightness, causing her toes to turn inward. Has been getting progressively tighter. Feels bakers cyst has gotten worse. Can't pinpoint pain. Throbbing ache everywhere with walking yesterday. Right now not so bad due to increased rest. Injected by Dr. Rooney 5/25. Also reports patellar maltracking.     Reason for visit:     What part of your body is injured / painful?  left knee    What caused the injury /pain? No inciting event     How long ago did your injury occur or pain begin? Over a year    What are your most bothersome symptoms? Pain and Swelling, giving out    How would you characterize your symptom?  aching, sharp and throbing    What makes your symptoms better? Rest and Tylenol, cortisone injection    What makes your symptoms worse? Standing and Walking    Have you been previously seen for this problem? Yes, Vikash Thorpe    Medical History:    Any recent changes to your medical history? No    Any new medication prescribed since last visit? No    Have you had surgery on this body part before? Yes mensicus repair    Social History:    Occupation:     Handedness: Right    Exercise: None    Review of Systems:    Do you have fever, chills, weight loss? No    Do you have any vision problems? No    Do you have any chest pain or edema? No    Do you have any shortness of breath or wheezing?  No    Do you have stomach problems? No    Do you have any numbness or focal weakness? No    Do you have diabetes? No    Do you have problems with bleeding or clotting? No    Do you have an rashes or other skin lesions? No

## 2018-11-05 RX ORDER — LIDOCAINE HYDROCHLORIDE 10 MG/ML
4 INJECTION, SOLUTION INFILTRATION; PERINEURAL
Status: SHIPPED | OUTPATIENT
Start: 2018-11-02

## 2018-11-05 RX ORDER — TRIAMCINOLONE ACETONIDE 40 MG/ML
40 INJECTION, SUSPENSION INTRA-ARTICULAR; INTRAMUSCULAR
Status: SHIPPED | OUTPATIENT
Start: 2018-11-02

## 2018-11-23 ENCOUNTER — THERAPY VISIT (OUTPATIENT)
Dept: PHYSICAL THERAPY | Facility: CLINIC | Age: 53
End: 2018-11-23
Payer: COMMERCIAL

## 2018-11-23 DIAGNOSIS — M25.562 CHRONIC PAIN OF LEFT KNEE: ICD-10-CM

## 2018-11-23 DIAGNOSIS — G89.29 CHRONIC PAIN OF LEFT KNEE: ICD-10-CM

## 2018-11-23 DIAGNOSIS — M76.32 ILIOTIBIAL BAND SYNDROME AFFECTING LEFT LOWER LEG: ICD-10-CM

## 2018-11-23 PROCEDURE — 99207 C STAT DRY NEEDLING - IAM: CPT | Mod: 25 | Performed by: PHYSICAL THERAPIST

## 2018-11-23 PROCEDURE — 97110 THERAPEUTIC EXERCISES: CPT | Mod: GP | Performed by: PHYSICAL THERAPIST

## 2018-11-23 PROCEDURE — 97161 PT EVAL LOW COMPLEX 20 MIN: CPT | Mod: GP | Performed by: PHYSICAL THERAPIST

## 2018-11-23 ASSESSMENT — ACTIVITIES OF DAILY LIVING (ADL)
GIVING WAY, BUCKLING OR SHIFTING OF KNEE: THE SYMPTOM AFFECTS MY ACTIVITY MODERATELY
WEAKNESS: THE SYMPTOM AFFECTS MY ACTIVITY MODERATELY
STIFFNESS: THE SYMPTOM AFFECTS MY ACTIVITY MODERATELY
RISE FROM A CHAIR: ACTIVITY IS SOMEWHAT DIFFICULT
LIMPING: THE SYMPTOM AFFECTS MY ACTIVITY MODERATELY
KNEE_ACTIVITY_OF_DAILY_LIVING_SUM: 31
SQUAT: ACTIVITY IS FAIRLY DIFFICULT
KNEEL ON THE FRONT OF YOUR KNEE: ACTIVITY IS FAIRLY DIFFICULT
SWELLING: THE SYMPTOM AFFECTS MY ACTIVITY MODERATELY
GO UP STAIRS: ACTIVITY IS FAIRLY DIFFICULT
STAND: ACTIVITY IS SOMEWHAT DIFFICULT
WALK: ACTIVITY IS FAIRLY DIFFICULT
PAIN: THE SYMPTOM AFFECTS MY ACTIVITY MODERATELY
SIT WITH YOUR KNEE BENT: ACTIVITY IS FAIRLY DIFFICULT
KNEE_ACTIVITY_OF_DAILY_LIVING_SCORE: 44.29
GO DOWN STAIRS: ACTIVITY IS SOMEWHAT DIFFICULT
RAW_SCORE: 31

## 2018-11-23 NOTE — MR AVS SNAPSHOT
After Visit Summary   11/23/2018    Jocelin Hernandez    MRN: [id number]           Patient Information     Date Of Birth          1965        Visit Information        Provider Department      11/23/2018 9:00 AM Cedric Hawley, [facility] [employer] [place]        Today's Diagnoses     Iliotibial band syndrome affecting left lower leg        Chronic pain of left knee           Follow-ups after your visit        Your next 10 appointments already scheduled     Nov 28, 2018  8:50 AM CST   RADHA Extremity with Cedric Hawley PT   [facility] ([facility])    [facility] [address]   [phone]            Dec 10, 2018  8:20 AM CST   RADHA Extremity with Cedric Hawley PT   [facility] ([facility])    [facility] [address]   [phone]            Dec 18, 2018  4:00 PM CST   GROUP with [facility]   [facility] ([facility])    [address]  [address]  [facility] [address]   [phone]            Aniceto 15, 2019  4:00 PM CST   GROUP with [facility]   [facility] ([facility])    [address]  [address]  [facility] [address]   [phone]            Feb 19, 2019  4:00 PM CST   GROUP with [facility]   [facility] ([facility])    [address]  [address]  [facility] [address]   [phone]            Mar 19, 2019  4:00 PM CDT   GROUP with [facility]   [facility] ([facility])    [address]  [address]  [facility] [address]   [phone]              Who to contact     If you have questions or need follow up information about today's clinic visit or your schedule please contact [facility] directly at [phone].  Normal or  non-critical lab and imaging results will be communicated to you by MyChart, letter or phone within 4 business days after the clinic has received the results. If you do not hear from us within 7 days, please contact the clinic through YouBeautyt or phone. If you have a critical or abnormal lab result, we will notify you by phone as soon as possible.  Submit refill requests through Simtrol or call your pharmacy and they will forward the refill request to us. Please allow 3 business days for your refill to be completed.          Additional Information About Your Visit        Simtrol Information     Simtrol gives you secure access to your electronic health record. If you see a primary care provider, you can also send messages to your care team and make appointments. If you have questions, please call your primary care clinic.  If you do not have a primary care provider, please call [phone] and they will assist you.        Care EveryWhere ID     This is your Care EveryWhere ID. This could be used by other organizations to access your [facility] medical records  [id number]         Blood Pressure from Last 3 Encounters:   11/02/18 (!) 167/103   08/10/18 133/83   07/03/18 134/89    Weight from Last 3 Encounters:   11/02/18 78.5 kg (173 lb)   09/12/18 78.5 kg (173 lb)   08/10/18 76.7 kg (169 lb)              We Performed the Following     Dry Needling - Qty 1     RADHA Inital Eval Report     PHYSICAL THERAPY REFERRAL (Internal)     PT Eval, Low Complexity (85089)     Therapeutic Exercises        Primary Care Provider Office Phone # Fax #    Nola Nielson [phone]       [address] [address]        Equal Access to Services     ANITA [facility] : Hadii georgia Pradhan, washadiada delmi, qaybta aidaalsoledad huff. So [facility] [phone].    ATENCIÓN: Si habla español, tiene a lantigua disposición servicios gratuitos de asistencia lingüística.  Musa corona [phone].    We comply with applicable federal civil rights laws and [place] laws. We do not discriminate on the basis of race, color, national origin, age, disability, sex, sexual orientation, or gender identity.            Thank you!     Thank you for choosing [facility] [place]  for your care. Our goal is always to provide you with excellent care. Hearing back from our patients is one way we can continue to improve our services. Please take a few minutes to complete the written survey that you may receive in the mail after your visit with us. Thank you!             Your Updated Medication List - Protect others around you: Learn how to safely use, store and throw away your medicines at www.disposemymeds.org.          This list is accurate as of 11/23/18 12:20 PM.  Always use your most recent med list.                   Brand Name Dispense Instructions for use Diagnosis    acetaminophen 500 MG tablet    TYLENOL     Take 1,000 mg by mouth        * ARMOUR THYROID 15 MG Tabs tablet   Generic drug:  thyroid      TK 1 T PO D WITH A 30MG DOSE        * ARMOUR THYROID 30 MG tablet   Generic drug:  thyroid      TK 1 T PO D        diclofenac 1 % topical gel    VOLTAREN    100 g    Place 2 g onto the skin 4 times daily    Left knee pain, unspecified chronicity       progesterone 100 MG capsule    PROMETRIUM    30 capsule    TAKE ONE CAPSULE BY MOUTH EVERY DAY    Night sweats       * Notice:  This list has 2 medication(s) that are the same as other medications prescribed for you. Read the directions carefully, and ask your doctor or other care provider to review them with you.

## 2018-11-23 NOTE — PROGRESS NOTES
[facility] Initial Evaluation  Subjective:  [facility]                  Physical Therapy Initial Examination/Evaluation  November 23, 2018    Jocelin Hernandez is a 53 year old female referred to physical therapy by Dr. Jones for treatment of left knee pain ITband with Precautions/Restrictions/MD instructions none  CSI to distal lateral knee 3 weeks ago.  Reports calf pain/knottign following this procedure-small knee pain relief-maybe shifted pain    Subjective:  DOI/onset: 11/1/17 DOS: nov 2017  Acute Injury or Gradual Onset?: Acute injury onset  Mechanism of Injury: walking at [facility]  Related PMH: hx acupuncture, menisical surgery, hx left hip labral repair-8 years ago-no current sxs Previous Treatment: nothing   Imaging: plain films see EMR  Chief Complaint/Functional Limitations:   Pain getting out of chair, out of car and walking, knee has buckled a few times-last time 2 nights a go so fearful with steps and see below in therapy evaluation codes   Pain: rest 4 /10, activity 8/10 Location: left knee med/lat-fat pad region, post knee pain Frequency: intermittent, any WB Described as: aching and sharp Alleviated by: Rest Progression of Symptoms: Gradually getting worse. Time of day when pain is worse: Activity related  Sleeping: No issues/uninterrupted   Occupation: homemaker  Job duties: prolonged sitting, prolonged standing, prolonged walking, lifting/carrying, pushing/pulling, driving  Current HEP/exercise regimen: none  Patient's goals are see chief complaints decrease pain, likes to walk, bike    Other pertinent PMH/Red Flags: Overweight   Barriers at home/work: None as reported by patient  Pertinent Surgical History: knee and hip  Medications: Thyroid  General health as reported by patient: fair  Return to MD:  Not at this time  Hx of left meniscus tear with repair 1 year ago-knee has never recovered from this condition.  Has had drainage bakers cysts following.  Done PT in the past and this has  increased the pain in the past.  3rd round of PT.      Objective:  System  KNEE EVALUATION    Static Posture  Pes planus mild  Antalgic gait, offloading to the right and significant limp    Dynamic Movement Screen  Single leg stance observations: pain on the left 10 seconds  Double limb squat observations: Anterior knee translation and Impaired weight distribution  Single limb squat observations: Not assessed          Knee ROM Extension Flexion   Left 2 135   Right 2 135      Flexibility Quadriceps Hamstrings Ankle Figure 4 Zeinab's   Left mild mild moderate none/WNL moderate   Right mild none/WNL none/WNL none/WNL mild     Hip and Knee Strength   MMT Hip Abduction Hip Extension     Left 4/5 and pain 4/5 and abd     Right 4+/5 4+/5 and abd       Knee MMT Quadriceps set Straight Leg Raise   Left Good Good   Right Good Good     Knee ligaments and meniscus: clear    Patellofemoral assessment: lat tilt DESHAUN    Palpation  Left: Moderate tenderness to palpation at mid IT band, TFL and glut max, MTrP: glut max, lat gastroc, TFL, IT band  Right: Mild tenderness to palpation at right IT band          Physical Exam    General     ROS    Assessment/Plan:    Patient is a 53 year old female with left side knee complaints.    Patient has the following significant findings with corresponding treatment plan.                Diagnosis 1:  Left knee IT band  Pain -  hot/cold therapy, manual therapy, splint/taping/bracing/orthotics, self management, education and home program  Decreased ROM/flexibility - manual therapy and therapeutic exercise  Decreased strength - therapeutic exercise and therapeutic activities  Impaired balance - neuro re-education and therapeutic activities  Impaired gait - gait training  Decreased function - therapeutic activities    Therapy Evaluation Codes:   1) History comprised of:   Personal factors that impact the plan of care:      None.    Comorbidity factors that impact the plan of care are:      Menopausal  and Overweight.     Medications impacting care: thyoid mes.  2) Examination of Body Systems comprised of:   Body structures and functions that impact the plan of care:      Hip.   Activity limitations that impact the plan of care are:      Cooking, Driving, Dressing and Lifting.  3) Clinical presentation characteristics are:   Stable/Uncomplicated.  4) Decision-Making    Low complexity using standardized patient assessment instrument and/or measureable assessment of functional outcome.  Cumulative Therapy Evaluation is: Low complexity.    Previous and current functional limitations:  (See Goal Flow Sheet for this information)    Short term and Long term goals: (See Goal Flow Sheet for this information)     Communication ability:  Patient appears to be able to clearly communicate and understand verbal and written communication and follow directions correctly.  Treatment Explanation - The following has been discussed with the patient:   RX ordered/plan of care  Anticipated outcomes  Possible risks and side effects  This patient would benefit from PT intervention to resume normal activities.   Rehab potential is fair.    Frequency:  1 X week, once daily  Duration:  for 3 weeks tapering to 2 X a month over 8 weeks  Discharge Plan:  Achieve all LTG.  Independent in home treatment program.  Reach maximal therapeutic benefit.    Please refer to the daily flowsheet for treatment today, total treatment time and time spent performing 1:1 timed codes.     The risks, perceived benefits and potential complications (including but not limited to: dizziness/drowsiness, bleeding, infection, pain, damage to adjacent structures, failure to relieve symptoms) of dry needling were discussed with the patient. Questions were addressed and answered.  The patient elected to proceed. Verbal informed consent was obtained.

## 2018-11-28 ENCOUNTER — THERAPY VISIT (OUTPATIENT)
Dept: PHYSICAL THERAPY | Facility: CLINIC | Age: 53
End: 2018-11-28
Payer: COMMERCIAL

## 2018-11-28 DIAGNOSIS — M76.32 ILIOTIBIAL BAND SYNDROME AFFECTING LEFT LOWER LEG: ICD-10-CM

## 2018-11-28 PROCEDURE — 97110 THERAPEUTIC EXERCISES: CPT | Mod: GP | Performed by: PHYSICAL THERAPIST

## 2018-11-28 PROCEDURE — 97112 NEUROMUSCULAR REEDUCATION: CPT | Mod: GP | Performed by: PHYSICAL THERAPIST

## 2018-11-28 PROCEDURE — 99207 C STAT DRY NEEDLING - IAM: CPT | Mod: 25 | Performed by: PHYSICAL THERAPIST

## 2018-12-04 ENCOUNTER — OFFICE VISIT (OUTPATIENT)
Dept: ORTHOPEDICS | Facility: CLINIC | Age: 53
End: 2018-12-04
Payer: COMMERCIAL

## 2018-12-04 VITALS — HEIGHT: 65 IN | BODY MASS INDEX: 28.82 KG/M2 | WEIGHT: 173 LBS

## 2018-12-04 DIAGNOSIS — G89.29 CHRONIC PAIN OF LEFT KNEE: Primary | ICD-10-CM

## 2018-12-04 DIAGNOSIS — M25.562 CHRONIC PAIN OF LEFT KNEE: Primary | ICD-10-CM

## 2018-12-04 DIAGNOSIS — Z98.890 S/P ARTHROSCOPIC PARTIAL MEDIAL MENISCECTOMY: ICD-10-CM

## 2018-12-04 NOTE — LETTER
12/4/2018       RE: Jocelin Hernandez  [address]  [facility] [address]     Dear Colleague,    Thank you for referring your patient, Jocelin Hernandez, to the [facility] SPORTS AND ORTHOPAEDIC WALK IN CLINIC at [facility]. Please see a copy of my visit note below.          SPORTS & ORTHOPEDIC WALK-IN FOLLOW-UP VISIT 12/4/2018    Interval History:     Follow up reason: Left knee pain     Date of injury: No event - chronic     Date last seen: 11/2/18    Following Therapeutic Plan: Yes     Pain: Worsening    Function: Worsening    Interval History: CSI on 11/2. Here for follow up after CSI.  She did not get any relief from the CSI. She notes her pain is more localized in her medial knee. She mostly as pain with stairs, knee flexion, and sudden movements. The dry needling has helped her ITB tightness.     Medical History:    Any recent changes to your medical history? No    Any new medication prescribed since last visit? No    Review of Systems:    Do you have fever, chills, weight loss? No    Do you have any vision problems? No    Do you have any chest pain or edema? No    Do you have any shortness of breath or wheezing?  No    Do you have stomach problems? No    Do you have any numbness or focal weakness? No    Do you have diabetes? No    Do you have problems with bleeding or clotting? No    Do you have an rashes or other skin lesions? No             ESTABLISHED PATIENT FOLLOW-UP:  RECHECK and Pain of the Left Knee       HISTORY OF PRESENT ILLNESS  Ms. Hernandez is a pleasant 53 year old year old female who presents to clinic today for follow-up of left knee pain - chronic    Date of injury: Since prior to meniscectomy 1 year ago  Date last seen: 11/02/18  Following Therapeutic Plan: Yes  Pain: Improved ITB, persisting medial knee  Function: No change  Interval History: Patient has seen Cedric Hawley x 2 visits.  Dry needling helped in ITB.  No improvement from injection on  "11/02/18.  Catching/locking down stairs.     Additional medical/Social/Surgical histories reviewed in [facility] and updated as appropriate.    REVIEW OF SYSTEMS (12/4/2018)  CONSTITUTIONAL: Denies fever and weight loss  GASTROINTESTINAL: Denies abdominal pain, nausea, vomiting  MUSCULOSKELETAL: See HPI  SKIN: Denies any recent rash or lesion  NEUROLOGICAL: Denies numbness or focal weakness     PHYSICAL EXAM  Ht 1.651 m (5' 5\")  Wt 78.5 kg (173 lb)  BMI 28.79 kg/m2    General  - normal appearance, in no obvious distress  CV  - normal popliteal pulse  Pulm  - normal respiratory pattern, non-labored  Musculoskeletal - knee  - stance: antalgic gait favoring the left lower extremity, obvious leg length discrepancy  - inspection: normal bone and joint alignment, no obvious deformity. Mild generalized swelling, mild left knee effusion,   - palpation: no joint line tenderness, patella and patellar tendon non-tender, no tenderness above the knee cap, discomfort of medial and lateral patellar facets, tenderness at the patellar tendon, lateral and medial joint line tenderness, fullness at the lateral aspect of the popliteal fossa with no tenderness, IT band tenderness from mid femur distally to insertion at the fibular head as well as Gerdy's tubercle.   - ROM: discomfort 135 degrees flexion, -5 degrees extension, not painful, normal actively and passively compared to contralateral  - strength: 5/5 in flexion, 5/5 in extension  - special tests:  (-) Lachman  (-) anterior drawer  (-) posterior drawer  (-) pivot shift  (-) Roldan  (-) Thessaly  (-) varus at 0 and 30 degrees flexion  (-) valgus at 0 and 30 degrees flexion  (-) Hossein s compression test  (-) patellar apprehension  Neuro  - no sensory or motor deficit, grossly normal coordination, normal muscle tone  Skin  - no ecchymosis, erythema, warmth, or induration, no obvious rash  Psych  - interactive, appropriate, normal mood and affect     ASSESSMENT & PLAN  Ms. Hernandez is a " 53 year old year old female who presents to clinic today with continued left medial knee pain, effusion, and improving ITB pain distally.  Given history of medial and lateral meniscal tears on MRI 10/15/17 and pain post-arthroscopy, reasonable to repeat MRI at this time.     Follow up after MRI    It was a pleasure seeing Jocelin.      Again, thank you for allowing me to participate in the care of your patient.      Sincerely,    Singh Jones, DO

## 2018-12-04 NOTE — MR AVS SNAPSHOT
After Visit Summary   12/4/2018    Jocelin Hernandez    MRN: [id number]           Patient Information     Date Of Birth          1965        Visit Information        Provider Department      12/4/2018 7:00 AM Singh Jones, DO [facility]        Today's Diagnoses     Chronic pain of left knee    -  1    S/P arthroscopic partial medial meniscectomy           Follow-ups after your visit        Your next 10 appointments already scheduled     Dec 06, 2018  6:15 AM CST   MR KNEE LEFT W/O CONTRAST with UCMR1   [facility] ([facility])    [address] [place] [address] [phone]           How do I prepare for my exam? (Food and drink instructions) **If you will be receiving sedation or general anesthesia, please see special notes below.**  How do I prepare for my exam? (Other instructions) Take your medicines as usual, unless your doctor tells you not to. Please remove any body piercings and hair extensions before you arrive. Follow your doctor s orders. If you do not, we may have to postpone your exam. You may or may not receive IV contrast for this exam pending the discretion of the Radiologist.  You do not need to do anything special to prepare. **If you will be receiving sedation or general anesthesia, please see special notes below.**  What should I wear:  The MRI machine uses a strong magnet. Please wear clothes without metal (snaps, zippers). A sweatsuit works well, or we may give you a hospital gown.  How long does the exam take: Most tests take 30 to 60 minutes.  HOWEVER, IF YOUR DOCTOR PRESCRIBES ANESTHESIA please plan on spending four to five hours in the recovery room.  What should I bring: Bring a list of your current medicines to your exam (including vitamins, minerals and over-the-counter drugs). Also bring the results of similar scans you may have had.  Do I need a : **If you  will be receiving sedation or general anesthesia, please see special notes below.**  What should I do after the exam? No Restrictions, You may resume normal activities.  What is this test: MRI (magnetic resonance imaging) uses a strong magnet and radio waves to look inside the body. An MRA (magnetic resonance angiogram) does the same thing, but it lets us look at your blood vessels. A computer turns the radio waves into pictures showing cross sections of the body, much like slices of bread. This helps us see any problems more clearly.  Who should I call with questions: Please call the Imaging Department at your exam site with any questions. Directions, parking instructions, and other information is available on our website, [email or web address].  How do I prepare if I m having sedation or anesthesia? **IMPORTANT** THE INSTRUCTIONS BELOW ARE ONLY FOR THOSE PATIENTS WHO HAVE BEEN TOLD THEY WILL RECEIVE SEDATION OR GENERAL ANESTHESIA DURING THEIR MRI PROCEDURE:  IF YOU WILL RECEIVE SEDATION (take medicine to help you relax during your exam): You must get the medicine from your doctor before you arrive. Bring the medicine to the exam. Do not take it at home. Arrive one hour early. Bring someone who can take you home after the test. Your medicine will make you sleepy. After the exam, you may not drive, take a bus or take a taxi by yourself. No eating 8 hours before your exam. You may have clear liquids up until 4 hours before your exam. (Clear liquids include water, clear tea, black coffee and fruit juice without pulp.)  IF YOU WILL RECEIVE ANESTHESIA (be asleep for your exam): Arrive 1 1/2 hours early. Bring someone who can take you home after the test. You may not drive, take a bus or take a taxi by yourself. No eating 8 hours before your exam. You may have clear liquids up until 4 hours before your exam. (Clear liquids include water, clear tea, black coffee and fruit juice without pulp.) You will spend four to five  hours in the recovery room.            Dec 09, 2018  9:00 AM CST   Return Walk In Ortho with Singh Jones DO   [facility] ([facility])    [address]  4th [address]  [facility] [address]   [phone]            Dec 10, 2018  8:20 AM CST   RADHA Extremity with Cedric Hawley PT   [facility] [place] ([facility])    [facility] [facility] [address]   [phone]            Dec 18, 2018  4:00 PM CST   GROUP with [facility]   [facility] ([facility])    [address]  [address]  [facility] [address]   [phone]            Aniceto 15, 2019  4:00 PM CST   GROUP with [facility] ([facility])    [address]  [address]  [facility] [address]   [phone]            Feb 19, 2019  4:00 PM CST   GROUP with [facility]   [facility] ([facility])    [address]  [address]  [facility] [address]   [phone]            Mar 19, 2019  4:00 PM CDT   GROUP with [facility] ([facility])    [address]  [address]  [facility] [address]   [phone]              Future tests that were ordered for you today     Open Future Orders        Priority Expected Expires Ordered    MR Knee Left w/o Contrast Routine  12/4/2019 12/4/2018            Who to contact     Please call your clinic at [phone] to:    Ask questions about your health    Make or cancel appointments    Discuss your medicines    Learn about your test results    Speak to your doctor            Additional Information About Your Visit        MyChart Information     [facility] gives you secure access to your electronic health record. If you see a primary care provider, you can also send messages to your care team and make appointments. If  "you have questions, please call your primary care clinic.  If you do not have a primary care provider, please call [phone] and they will assist you.      [employer] is an electronic gateway that provides easy, online access to your medical records. With [employer], you can request a clinic appointment, read your test results, renew a prescription or communicate with your care team.     To access your existing account, please contact your [facility] or call [phone] for assistance.        Care EveryWhere ID     This is your Care EveryWhere ID. This could be used by other organizations to access your [place] medical records  [id number]        Your Vitals Were     Height BMI (Body Mass Index)                1.651 m (5' 5\") 28.79 kg/m2           Blood Pressure from Last 3 Encounters:   11/02/18 (!) 167/103   08/10/18 133/83   07/03/18 134/89    Weight from Last 3 Encounters:   12/04/18 78.5 kg (173 lb)   11/02/18 78.5 kg (173 lb)   09/12/18 78.5 kg (173 lb)               Primary Care Provider Office Phone # Fax #    Nola Nielson [phone]       [address] [address]        Equal Access to Services     CHARLES AMOR : Hadii georgia ku hadasho Soomaali, waaxda luqadaha, qaybta kaalmada adeegyada, waxay maryin haycorinen archie serrano . So [facility] [phone].    ATENCIÓN: Si habla español, tiene a lantigua disposición servicios gratuitos de asistencia lingüística. Llame al [phone].    We comply with applicable federal civil rights laws and [place] laws. We do not discriminate on the basis of race, color, national origin, age, disability, sex, sexual orientation, or gender identity.            Thank you!     Thank you for choosing [facility]  for your care. Our goal is always to provide you with excellent care. Hearing back from our patients is one way we can continue to improve our services. Please take a " few minutes to complete the written survey that you may receive in the mail after your visit with us. Thank you!             Your Updated Medication List - Protect others around you: Learn how to safely use, store and throw away your medicines at www.disposemymeds.org.          This list is accurate as of 12/4/18  7:51 AM.  Always use your most recent med list.                   Brand Name Dispense Instructions for use Diagnosis    acetaminophen 500 MG tablet    TYLENOL     Take 1,000 mg by mouth        * ARMOUR THYROID 15 MG tablet   Generic drug:  thyroid      TK 1 T PO D WITH A 30MG DOSE        * ARMOUR THYROID 30 MG tablet   Generic drug:  thyroid      TK 1 T PO D        diclofenac 1 % topical gel    VOLTAREN    100 g    Place 2 g onto the skin 4 times daily    Left knee pain, unspecified chronicity       progesterone 100 MG capsule    PROMETRIUM    30 capsule    TAKE ONE CAPSULE BY MOUTH EVERY DAY    Night sweats       * Notice:  This list has 2 medication(s) that are the same as other medications prescribed for you. Read the directions carefully, and ask your doctor or other care provider to review them with you.

## 2018-12-04 NOTE — PROGRESS NOTES
SPORTS & ORTHOPEDIC WALK-IN FOLLOW-UP VISIT 12/4/2018    Interval History:     Follow up reason: Left knee pain     Date of injury: No event - chronic     Date last seen: 11/2/18    Following Therapeutic Plan: Yes     Pain: Worsening    Function: Worsening    Interval History: CSI on 11/2. Here for follow up after CSI.  She did not get any relief from the CSI. She notes her pain is more localized in her medial knee. She mostly as pain with stairs, knee flexion, and sudden movements. The dry needling has helped her ITB tightness.     Medical History:    Any recent changes to your medical history? No    Any new medication prescribed since last visit? No    Review of Systems:    Do you have fever, chills, weight loss? No    Do you have any vision problems? No    Do you have any chest pain or edema? No    Do you have any shortness of breath or wheezing?  No    Do you have stomach problems? No    Do you have any numbness or focal weakness? No    Do you have diabetes? No    Do you have problems with bleeding or clotting? No    Do you have an rashes or other skin lesions? No

## 2018-12-04 NOTE — PROGRESS NOTES
"ESTABLISHED PATIENT FOLLOW-UP:  RECHECK and Pain of the Left Knee       HISTORY OF PRESENT ILLNESS  Ms. Hernandez is a pleasant 53 year old year old female who presents to clinic today for follow-up of left knee pain - chronic    Date of injury: Since prior to meniscectomy 1 year ago  Date last seen: 11/02/18  Following Therapeutic Plan: Yes  Pain: Improved ITB, persisting medial knee  Function: No change  Interval History: Patient has seen Cedric Hawley x 2 visits.  Dry needling helped in ITB.  No improvement from injection on 11/02/18.  Catching/locking down stairs.     Additional medical/Social/Surgical histories reviewed in [facility] and updated as appropriate.    REVIEW OF SYSTEMS (12/4/2018)  CONSTITUTIONAL: Denies fever and weight loss  GASTROINTESTINAL: Denies abdominal pain, nausea, vomiting  MUSCULOSKELETAL: See HPI  SKIN: Denies any recent rash or lesion  NEUROLOGICAL: Denies numbness or focal weakness     PHYSICAL EXAM  Ht 1.651 m (5' 5\")  Wt 78.5 kg (173 lb)  BMI 28.79 kg/m2    General  - normal appearance, in no obvious distress  CV  - normal popliteal pulse  Pulm  - normal respiratory pattern, non-labored  Musculoskeletal - knee  - stance: antalgic gait favoring the left lower extremity, obvious leg length discrepancy  - inspection: normal bone and joint alignment, no obvious deformity. Mild generalized swelling, mild left knee effusion,   - palpation: no joint line tenderness, patella and patellar tendon non-tender, no tenderness above the knee cap, discomfort of medial and lateral patellar facets, tenderness at the patellar tendon, lateral and medial joint line tenderness, fullness at the lateral aspect of the popliteal fossa with no tenderness, IT band tenderness from mid femur distally to insertion at the fibular head as well as Gerdy's tubercle.   - ROM: discomfort 135 degrees flexion, -5 degrees extension, not painful, normal actively and passively compared to contralateral  - strength: 5/5 in " flexion, 5/5 in extension  - special tests:  (-) Lachman  (-) anterior drawer  (-) posterior drawer  (-) pivot shift  (-) Roldan  (-) Thessaly  (-) varus at 0 and 30 degrees flexion  (-) valgus at 0 and 30 degrees flexion  (-) Hossein s compression test  (-) patellar apprehension  Neuro  - no sensory or motor deficit, grossly normal coordination, normal muscle tone  Skin  - no ecchymosis, erythema, warmth, or induration, no obvious rash  Psych  - interactive, appropriate, normal mood and affect     ASSESSMENT & PLAN  Ms. Hernandez is a 53 year old year old female who presents to clinic today with continued left medial knee pain, effusion, and improving ITB pain distally.  Given history of medial and lateral meniscal tears on MRI 10/15/17 and pain post-arthroscopy, reasonable to repeat MRI at this time.     Follow up after MRI    It was a pleasure seeing Jocelin.    Singh Jones DO, [facility]  Primary Care Sports Medicine

## 2018-12-06 ENCOUNTER — RADIANT APPOINTMENT (OUTPATIENT)
Dept: MRI IMAGING | Facility: CLINIC | Age: 53
End: 2018-12-06
Attending: FAMILY MEDICINE
Payer: COMMERCIAL

## 2018-12-06 DIAGNOSIS — M25.562 CHRONIC PAIN OF LEFT KNEE: ICD-10-CM

## 2018-12-06 DIAGNOSIS — Z98.890 S/P ARTHROSCOPIC PARTIAL MEDIAL MENISCECTOMY: ICD-10-CM

## 2018-12-06 DIAGNOSIS — G89.29 CHRONIC PAIN OF LEFT KNEE: ICD-10-CM

## 2018-12-09 ENCOUNTER — OFFICE VISIT (OUTPATIENT)
Dept: ORTHOPEDICS | Facility: CLINIC | Age: 53
End: 2018-12-09
Payer: COMMERCIAL

## 2018-12-09 VITALS
BODY MASS INDEX: 28.82 KG/M2 | WEIGHT: 173 LBS | HEIGHT: 65 IN | SYSTOLIC BLOOD PRESSURE: 162 MMHG | DIASTOLIC BLOOD PRESSURE: 92 MMHG | HEART RATE: 101 BPM

## 2018-12-09 DIAGNOSIS — M17.12 PRIMARY OSTEOARTHRITIS OF ONE KNEE, LEFT: Primary | ICD-10-CM

## 2018-12-09 DIAGNOSIS — M25.562 CHRONIC PAIN OF LEFT KNEE: ICD-10-CM

## 2018-12-09 DIAGNOSIS — G89.29 CHRONIC PAIN OF LEFT KNEE: ICD-10-CM

## 2018-12-09 ASSESSMENT — MIFFLIN-ST. JEOR: SCORE: 1390.6

## 2018-12-09 NOTE — PROGRESS NOTES
"ESTABLISHED PATIENT FOLLOW-UP:  Pain of the Left Knee     HISTORY OF PRESENT ILLNESS  Ms. Hernandez is a pleasant 53 year old year old female who presents to clinic today for follow-up of left knee pain.      Date of injury: Chronic  Date last seen: 12/04/18  Following Therapeutic Plan: yes, PT with Cedric Hawley  Pain: Improved mildly  Function: Unchanged  Interval History: Patient continues to experience medial knee pain.  Exacerbated by walking. Improved at rest. PT x 2 has improved lateral knee pain.  Also believes knee felt slightly improved in days after our last exam.  However, patient wore heels to a holiday party last week and noted exacerbation of pain.       Additional medical/Social/Surgical histories reviewed in [facility] and updated as appropriate.    REVIEW OF SYSTEMS (12/9/2018)  CONSTITUTIONAL: Denies fever and weight loss  GASTROINTESTINAL: Denies abdominal pain, nausea, vomiting  MUSCULOSKELETAL: See HPI  SKIN: Denies any recent rash or lesion  NEUROLOGICAL: Denies numbness or focal weakness     PHYSICAL EXAM  BP (!) 162/92   Pulse 101   Ht 1.651 m (5' 5\")   Wt 78.5 kg (173 lb)   BMI 28.79 kg/m      General Appearance: Well appearing, alert, in no acute distress, well-hydrated, and well nourished  Skin: No rashes, lesions, or ecchymosis present  Cardiovascular: pedal pulses and radial pulses normal, no signs of upper or lower extremity edema  Respiratory: no respiratory distress, no audible wheezing, no labored breathing, symmetric thoracic excursion  Psychiatric: mood and affect are appropriate, patient is oriented to time, place and person  MSK: Deferred today    IMAGING :    Impression:  1. Interim postsurgical change of partial medial meniscectomy without  MRI evidence to suggest re-tear on this non-arthrographic study.  2. 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  3. Increased anterior translation of proximal " tibia relative to distal  femur with increased ACL signal, and altered its contour distally,  query ACL impingement with cystic degeneration.  4. Thickened diffuse mild intrasubstance signal of posterior cruciate  ligament, may be related to sprain or altered biomechanics.  5. Small to moderate joint effusion with synovitis.  6. Moderate size popliteal cyst.     BRANDON LINARES    ASSESSMENT & PLAN  Ms. Hernandez is a 53 year old year old female who presents to clinic today with persisting left knee pain and review of MRI left knee.  MRI revealing tricompartmental cartilage loss, most significant pain generator likely medial femoral condyle edema and full thickness cartilage loss.  Also signs of a knee sprain with trace MCL, PCL signal.  At this time we discussed strategies for pain and functional improvement.      -Medial  bracing/hinged brace for pain control  -Pre-approval for Hyaluronic Acid injection  -Continue PT/HEP  -Activity as tolerated; low impact exercise  -Sensible footwear discussed at least in near future  -Consider popliteal cyst aspiration/injection if bothersome/persisting  -Follow up for HA injection after approved    It was a pleasure seeing Jocelin.    Singh Jones DO, CAQSM  Primary Care Sports Medicine

## 2018-12-09 NOTE — PROGRESS NOTES
SPORTS & ORTHOPEDIC WALK-IN FOLLOW-UP VISIT 12/9/2018    Interval History:     Follow up reason: MRI results     Date of injury: Chronic    Date last seen: 12/4/18    Following Therapeutic Plan: Yes     Pain: Improving    Function: Improving    Interval History: Maybe a little better.  Felt a little better after ROM at last appointment but since then went to a party and wore short heels and experienced a lot of pain and swelling after that.     Medical History:    Any recent changes to your medical history? No    Any new medication prescribed since last visit? No    Review of Systems:    Do you have fever, chills, weight loss? No    Do you have any vision problems? No    Do you have any chest pain or edema? No    Do you have any shortness of breath or wheezing?  No    Do you have stomach problems? No    Do you have any numbness or focal weakness? No    Do you have diabetes? No    Do you have problems with bleeding or clotting? No    Do you have an rashes or other skin lesions? No

## 2018-12-09 NOTE — LETTER
12/9/2018       RE: Jocelin Hernandez  [address]  [facility] [address]     Dear Colleague,    Thank you for referring your patient, Jocelin Hernandez, to the [facility] AND ORTHOPAEDIC WALK IN CLINIC at [facility]. Please see a copy of my visit note below.          SPORTS & ORTHOPEDIC WALK-IN FOLLOW-UP VISIT 12/9/2018    Interval History:     Follow up reason: MRI results     Date of injury: Chronic    Date last seen: 12/4/18    Following Therapeutic Plan: Yes     Pain: Improving    Function: Improving    Interval History: Maybe a little better.  Felt a little better after ROM at last appointment but since then went to a party and wore short heels and experienced a lot of pain and swelling after that.     Medical History:    Any recent changes to your medical history? No    Any new medication prescribed since last visit? No    Review of Systems:    Do you have fever, chills, weight loss? No    Do you have any vision problems? No    Do you have any chest pain or edema? No    Do you have any shortness of breath or wheezing?  No    Do you have stomach problems? No    Do you have any numbness or focal weakness? No    Do you have diabetes? No    Do you have problems with bleeding or clotting? No    Do you have an rashes or other skin lesions? No             ESTABLISHED PATIENT FOLLOW-UP:  Pain of the Left Knee     HISTORY OF PRESENT ILLNESS  Ms. Hernandez is a pleasant 53 year old year old female who presents to clinic today for follow-up of left knee pain.      Date of injury: Chronic  Date last seen: 12/04/18  Following Therapeutic Plan: yes, PT with Cedric Hawley  Pain: Improved mildly  Function: Unchanged  Interval History: Patient continues to experience medial knee pain.  Exacerbated by walking. Improved at rest. PT x 2 has improved lateral knee pain.  Also believes knee felt slightly improved in days after our last exam.  However, patient wore heels to a holiday party  "last week and noted exacerbation of pain.       Additional medical/Social/Surgical histories reviewed in [facility] and updated as appropriate.    REVIEW OF SYSTEMS (12/9/2018)  CONSTITUTIONAL: Denies fever and weight loss  GASTROINTESTINAL: Denies abdominal pain, nausea, vomiting  MUSCULOSKELETAL: See HPI  SKIN: Denies any recent rash or lesion  NEUROLOGICAL: Denies numbness or focal weakness     PHYSICAL EXAM  BP (!) 162/92   Pulse 101   Ht 1.651 m (5' 5\")   Wt 78.5 kg (173 lb)   BMI 28.79 kg/m       General Appearance: Well appearing, alert, in no acute distress, well-hydrated, and well nourished  Skin: No rashes, lesions, or ecchymosis present  Cardiovascular: pedal pulses and radial pulses normal, no signs of upper or lower extremity edema  Respiratory: no respiratory distress, no audible wheezing, no labored breathing, symmetric thoracic excursion  Psychiatric: mood and affect are appropriate, patient is oriented to time, place and person  MSK: Deferred today    IMAGING :    Impression:  1. Interim postsurgical change of partial medial meniscectomy without  MRI evidence to suggest re-tear on this non-arthrographic study.  2. 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  3. Increased anterior translation of proximal tibia relative to distal  femur with increased ACL signal, and altered its contour distally,  query ACL impingement with cystic degeneration.  4. Thickened diffuse mild intrasubstance signal of posterior cruciate  ligament, may be related to sprain or altered biomechanics.  5. Small to moderate joint effusion with synovitis.  6. Moderate size popliteal cyst.     BRANDON LINARES    ASSESSMENT & PLAN  Ms. Hernandez is a 53 year old year old female who presents to clinic today with persisting left knee pain and review of MRI left knee.  MRI revealing tricompartmental cartilage loss, most significant pain generator likely " medial femoral condyle edema and full thickness cartilage loss.  Also signs of a knee sprain with trace MCL, PCL signal.  At this time we discussed strategies for pain and functional improvement.      -Medial  bracing/hinged brace for pain control  -Pre-approval for Hyaluronic Acid injection  -Continue PT/HEP  -Activity as tolerated; low impact exercise  -Sensible footwear discussed at least in near future  -Consider popliteal cyst aspiration/injection if bothersome/persisting  -Follow up for HA injection after approved    It was a pleasure seeing Jocelin.      Again, thank you for allowing me to participate in the care of your patient.      Sincerely,    Singh Jones, DO

## 2018-12-09 NOTE — LETTER
12/9/2018      RE: Jocelin Hernandez  [address]  [facility] [address]             SPORTS & ORTHOPEDIC WALK-IN FOLLOW-UP VISIT 12/9/2018    Interval History:     Follow up reason: MRI results     Date of injury: Chronic    Date last seen: 12/4/18    Following Therapeutic Plan: Yes     Pain: Improving    Function: Improving    Interval History: Maybe a little better.  Felt a little better after ROM at last appointment but since then went to a party and wore short heels and experienced a lot of pain and swelling after that.     Medical History:    Any recent changes to your medical history? No    Any new medication prescribed since last visit? No    Review of Systems:    Do you have fever, chills, weight loss? No    Do you have any vision problems? No    Do you have any chest pain or edema? No    Do you have any shortness of breath or wheezing?  No    Do you have stomach problems? No    Do you have any numbness or focal weakness? No    Do you have diabetes? No    Do you have problems with bleeding or clotting? No    Do you have an rashes or other skin lesions? No             ESTABLISHED PATIENT FOLLOW-UP:  Pain of the Left Knee     HISTORY OF PRESENT ILLNESS  Ms. Hernandez is a pleasant 53 year old year old female who presents to clinic today for follow-up of left knee pain.      Date of injury: Chronic  Date last seen: 12/04/18  Following Therapeutic Plan: yes, PT with Cedric Hawley  Pain: Improved mildly  Function: Unchanged  Interval History: Patient continues to experience medial knee pain.  Exacerbated by walking. Improved at rest. PT x 2 has improved lateral knee pain.  Also believes knee felt slightly improved in days after our last exam.  However, patient wore heels to a holiday party last week and noted exacerbation of pain.       Additional medical/Social/Surgical histories reviewed in [facility] and updated as appropriate.    REVIEW OF SYSTEMS (12/9/2018)  CONSTITUTIONAL: Denies fever and weight  "loss  GASTROINTESTINAL: Denies abdominal pain, nausea, vomiting  MUSCULOSKELETAL: See HPI  SKIN: Denies any recent rash or lesion  NEUROLOGICAL: Denies numbness or focal weakness     PHYSICAL EXAM  BP (!) 162/92   Pulse 101   Ht 1.651 m (5' 5\")   Wt 78.5 kg (173 lb)   BMI 28.79 kg/m       General Appearance: Well appearing, alert, in no acute distress, well-hydrated, and well nourished  Skin: No rashes, lesions, or ecchymosis present  Cardiovascular: pedal pulses and radial pulses normal, no signs of upper or lower extremity edema  Respiratory: no respiratory distress, no audible wheezing, no labored breathing, symmetric thoracic excursion  Psychiatric: mood and affect are appropriate, patient is oriented to time, place and person  MSK: Deferred today    IMAGING :    Impression:  1. Interim postsurgical change of partial medial meniscectomy without  MRI evidence to suggest re-tear on this non-arthrographic study.  2. 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  3. Increased anterior translation of proximal tibia relative to distal  femur with increased ACL signal, and altered its contour distally,  query ACL impingement with cystic degeneration.  4. Thickened diffuse mild intrasubstance signal of posterior cruciate  ligament, may be related to sprain or altered biomechanics.  5. Small to moderate joint effusion with synovitis.  6. Moderate size popliteal cyst.     BRANDON LINARES    ASSESSMENT & PLAN  Ms. Hernandez is a 53 year old year old female who presents to clinic today with persisting left knee pain and review of MRI left knee.  MRI revealing tricompartmental cartilage loss, most significant pain generator likely medial femoral condyle edema and full thickness cartilage loss.  Also signs of a knee sprain with trace MCL, PCL signal.  At this time we discussed strategies for pain and functional improvement.      -Medial  " bracing/hinged brace for pain control  -Pre-approval for Hyaluronic Acid injection  -Continue PT/HEP  -Activity as tolerated; low impact exercise  -Sensible footwear discussed at least in near future  -Consider popliteal cyst aspiration/injection if bothersome/persisting  -Follow up for HA injection after approved    It was a pleasure seeing Jocelin.    Singh Jones DO, CAQSM  Primary Care Sports Medicine

## 2018-12-09 NOTE — PATIENT INSTRUCTIONS
Knee injection  - Synvisc One - Gel injection (Hyaluronic Acid)  - Gel -One - Gel injection (Hyaluronic Acid)    Knee Brace  - Medial  brace - [facility] [place]    Physical Therapy  - Peter

## 2018-12-10 ENCOUNTER — THERAPY VISIT (OUTPATIENT)
Dept: PHYSICAL THERAPY | Facility: CLINIC | Age: 53
End: 2018-12-10
Payer: COMMERCIAL

## 2018-12-10 DIAGNOSIS — M76.32 ILIOTIBIAL BAND SYNDROME AFFECTING LEFT LOWER LEG: ICD-10-CM

## 2018-12-10 PROCEDURE — 97140 MANUAL THERAPY 1/> REGIONS: CPT | Mod: GP | Performed by: PHYSICAL THERAPIST

## 2018-12-10 PROCEDURE — 99207 C STAT DRY NEEDLING - IAM: CPT | Mod: 25 | Performed by: PHYSICAL THERAPIST

## 2018-12-13 ENCOUNTER — OFFICE VISIT (OUTPATIENT)
Dept: ORTHOPEDICS | Facility: CLINIC | Age: 53
End: 2018-12-13
Payer: COMMERCIAL

## 2018-12-13 VITALS — HEIGHT: 65 IN | BODY MASS INDEX: 28.82 KG/M2 | HEART RATE: 95 BPM | WEIGHT: 173 LBS

## 2018-12-13 DIAGNOSIS — M17.12 PRIMARY OSTEOARTHRITIS OF LEFT KNEE: Primary | ICD-10-CM

## 2018-12-13 RX ORDER — LIDOCAINE HYDROCHLORIDE 10 MG/ML
3 INJECTION, SOLUTION INFILTRATION; PERINEURAL
Status: SHIPPED | OUTPATIENT
Start: 2018-12-13

## 2018-12-13 RX ADMIN — LIDOCAINE HYDROCHLORIDE 3 ML: 10 INJECTION, SOLUTION INFILTRATION; PERINEURAL at 12:01

## 2018-12-13 ASSESSMENT — MIFFLIN-ST. JEOR: SCORE: 1390.6

## 2018-12-13 NOTE — LETTER
12/13/2018       RE: Jocelin Hernandez  [address] [facility] [address]     Dear Colleague,    Thank you for referring your patient, Jocelin Hernandez, to the [facility] SPORTS AND ORTHOPAEDIC WALK IN CLINIC at [facility]. Please see a copy of my visit note below.    ESTABLISHED PATIENT FOLLOW-UP:  RECHECK of the Left Knee       HISTORY OF PRESENT ILLNESS  Ms. Hernandez is a pleasant 53 year old year old female who presents to clinic today for follow-up for hyaluronic acid injection today:    Diagnosis: Primary osteoarthritis of left knee    Treatment to date:  Corticosteroid injection 11/02/18  PT sessions x 3    Referred by: Me    Knee Injection with Synvisc - Ultrasound Guided  The patient was informed of the risks and the benefits of the procedure and a written consent was signed.  The patient s knee was prepped with chlorhexidine in sterile fashion.   Synvisc One syringe and medication removed from packaging and examined for package consistency.  Injection was performed using sterile technique.  Anesthesia achieved at superolateral knee with 3cc of 1% lidocaine without epinephrine.  Under ultrasound guidance a 1.5-inch 22-gauge needle was used to enter the superolateral aspect of the left knee.  Needle placement was visualized and documented with ultrasound.  Ultrasound visualization was necessary due to decreased joint space in the setting of osteoarthritis.  Images were permanently stored for the patient's record.  There were no complications. The patient tolerated the procedure well. There was negligible bleeding.   The patient was instructed to ice the knee upon leaving clinic and refrain from overuse over the next 3 days.   The patient was instructed to call or go to the emergency room with any unusual pain, swelling, redness, or if otherwise concerned.  A follow up appointment will be scheduled to evaluate response to the injection, and to assess range of motion  and pain.  Large Joint Injection/Arthocentesis: L knee joint  Date/Time: 12/13/2018 12:01 PM  Performed by: Singh Jones DO  Authorized by: Singh Jones DO     Indications:  Osteoarthritis  Needle Size:  22 G  Guidance: ultrasound    Approach:  Superolateral  Location:  Knee  Site:  L knee joint  Medications:  3 mL lidocaine 1 %; 48 mg hylan 48 MG/6ML  Procedure discussed: discussed risks, benefits, and alternatives    Consent Given by:  Patient  Timeout: timeout called immediately prior to procedure    Prep: patient was prepped and draped in usual sterile fashion          ASSESSMENT AND PLAN:   Ms. Hernandez is a 53 year old year old female with PMHx primary osteoarthritis of left knee who presents to clinic today for hyaluronic acid injection to left knee.    -HA injection today  -PT with Cedric Hawley  -Continue ice, bracing  -Follow up 2 months    It was a pleasure seeing Jocelin.                        SPORTS & ORTHOPEDIC WALK-IN FOLLOW-UP VISIT 12/13/2018    Interval History:     Follow up reason: L knee OA    Date of injury: Chronic    Date last seen: 11/9/18    Following Therapeutic Plan: Yes     Pain: Unchanged    Function: Unchanged    Interval History: NA     Medical History:    Any recent changes to your medical history? No    Any new medication prescribed since last visit? No    Review of Systems:    Do you have fever, chills, weight loss? No    Do you have any vision problems? No    Do you have any chest pain or edema? No    Do you have any shortness of breath or wheezing?  No    Do you have stomach problems? No    Do you have any numbness or focal weakness? No    Do you have diabetes? No    Do you have problems with bleeding or clotting? No    Do you have an rashes or other skin lesions? No           Singh Jones,

## 2018-12-13 NOTE — PROGRESS NOTES
ESTABLISHED PATIENT FOLLOW-UP:  RECHECK of the Left Knee       HISTORY OF PRESENT ILLNESS  Ms. Hernandez is a pleasant 53 year old year old female who presents to clinic today for follow-up for hyaluronic acid injection today:    Diagnosis: Primary osteoarthritis of left knee    Treatment to date:  Corticosteroid injection 11/02/18  PT sessions x 3    Referred by: Me    Knee Injection with Synvisc - Ultrasound Guided  The patient was informed of the risks and the benefits of the procedure and a written consent was signed.  The patient s knee was prepped with chlorhexidine in sterile fashion.   Synvisc One syringe and medication removed from packaging and examined for package consistency.  Injection was performed using sterile technique.  Anesthesia achieved at superolateral knee with 3cc of 1% lidocaine without epinephrine.  Under ultrasound guidance a 1.5-inch 22-gauge needle was used to enter the superolateral aspect of the left knee.  Needle placement was visualized and documented with ultrasound.  Ultrasound visualization was necessary due to decreased joint space in the setting of osteoarthritis.  Images were permanently stored for the patient's record.  There were no complications. The patient tolerated the procedure well. There was negligible bleeding.   The patient was instructed to ice the knee upon leaving clinic and refrain from overuse over the next 3 days.   The patient was instructed to call or go to the emergency room with any unusual pain, swelling, redness, or if otherwise concerned.  A follow up appointment will be scheduled to evaluate response to the injection, and to assess range of motion and pain.  Large Joint Injection/Arthocentesis: L knee joint  Date/Time: 12/13/2018 12:01 PM  Performed by: Singh Jones DO  Authorized by: Singh Jones DO     Indications:  Osteoarthritis  Needle Size:  22 G  Guidance: ultrasound    Approach:  Superolateral  Location:  Knee  Site:  L knee  joint  Medications:  3 mL lidocaine 1 %; 48 mg hylan 48 MG/6ML  Procedure discussed: discussed risks, benefits, and alternatives    Consent Given by:  Patient  Timeout: timeout called immediately prior to procedure    Prep: patient was prepped and draped in usual sterile fashion          ASSESSMENT AND PLAN:   Ms. Hernandez is a 53 year old year old female with PMHx primary osteoarthritis of left knee who presents to clinic today for hyaluronic acid injection to left knee.    -HA injection today  -PT with Cedric Hawley  -Continue ice, bracing  -Follow up 2 months    It was a pleasure seeing Jocelin.    Singh Jones DO, CAQSM  Primary Care Sports Medicine

## 2018-12-13 NOTE — PROGRESS NOTES
SPORTS & ORTHOPEDIC WALK-IN FOLLOW-UP VISIT 12/13/2018    Interval History:     Follow up reason: L knee OA    Date of injury: Chronic    Date last seen: 11/9/18    Following Therapeutic Plan: Yes     Pain: Unchanged    Function: Unchanged    Interval History: NA     Medical History:    Any recent changes to your medical history? No    Any new medication prescribed since last visit? No    Review of Systems:    Do you have fever, chills, weight loss? No    Do you have any vision problems? No    Do you have any chest pain or edema? No    Do you have any shortness of breath or wheezing?  No    Do you have stomach problems? No    Do you have any numbness or focal weakness? No    Do you have diabetes? No    Do you have problems with bleeding or clotting? No    Do you have an rashes or other skin lesions? No

## 2019-01-15 PROBLEM — M25.562 LEFT KNEE PAIN: Status: RESOLVED | Noted: 2018-06-13 | Resolved: 2019-01-15

## 2019-02-16 ENCOUNTER — OFFICE VISIT (OUTPATIENT)
Dept: ORTHOPEDICS | Facility: CLINIC | Age: 54
End: 2019-02-16
Payer: COMMERCIAL

## 2019-02-16 VITALS — BODY MASS INDEX: 29.49 KG/M2 | WEIGHT: 177 LBS | HEIGHT: 65 IN | RESPIRATION RATE: 16 BRPM

## 2019-02-16 DIAGNOSIS — M25.562 CHRONIC PAIN OF LEFT KNEE: ICD-10-CM

## 2019-02-16 DIAGNOSIS — M17.12 PRIMARY OSTEOARTHRITIS OF LEFT KNEE: Primary | ICD-10-CM

## 2019-02-16 DIAGNOSIS — G89.29 CHRONIC PAIN OF LEFT KNEE: ICD-10-CM

## 2019-02-16 ASSESSMENT — MIFFLIN-ST. JEOR: SCORE: 1403.75

## 2019-02-16 NOTE — PROGRESS NOTES
SPORTS & ORTHOPEDIC WALK-IN FOLLOW-UP VISIT 2/16/2019    Interval History:     Follow up reason: follow up of left knee     Date of injury: NA    Date last seen: 12/13/18    Following Therapeutic Plan: Yes     Pain: Worsening    Function: Worsening    Interval History: Stairs, sitting to standing she notes locking      Medical History:    Any recent changes to your medical history? No    Any new medication prescribed since last visit? No    Review of Systems:    Do you have fever, chills, weight loss? No    Do you have any vision problems? No    Do you have any chest pain or edema? No    Do you have any shortness of breath or wheezing?  No    Do you have stomach problems? No    Do you have any numbness or focal weakness? No    Do you have diabetes? No    Do you have problems with bleeding or clotting? No    Do you have an rashes or other skin lesions? No

## 2019-02-17 ENCOUNTER — TELEPHONE (OUTPATIENT)
Dept: ORTHOPEDICS | Facility: CLINIC | Age: 54
End: 2019-02-17

## 2019-02-17 NOTE — PROGRESS NOTES
"ESTABLISHED PATIENT FOLLOW-UP:  RECHECK (Follow up on left knee )       HISTORY OF PRESENT ILLNESS  Ms. Hernandez is a pleasant 54 year old year old female who presents to clinic today for follow-up of left knee pain    Date of injury: 5 months+  Date last seen: 12/09/18  Following Therapeutic Plan: yes  Pain: Persistent  Function: Interval decline in function, knee now locking.  Interval History: Patient states knee is locking, catching now. Typically occurs after standing from prolonged sitting.   Takes 10-15 steps to unlock.  Makes it clear that it is not just stiff.  Pain persists medial knee. No improvement from synvisc, corticosteroid injection.  brace obtained but does not help either.  Patient notes she is very immobile and cannot perform IADLs.    Additional medical/Social/Surgical histories reviewed in [facility] and updated as appropriate.    REVIEW OF SYSTEMS (2/17/2019)  CONSTITUTIONAL: Denies fever and weight loss  GASTROINTESTINAL: Denies abdominal pain, nausea, vomiting  MUSCULOSKELETAL: See HPI  SKIN: Denies any recent rash or lesion  NEUROLOGICAL: Denies numbness or focal weakness     PHYSICAL EXAM  Resp 16   Ht 1.651 m (5' 5\")   Wt 80.3 kg (177 lb)   BMI 29.45 kg/m        General  - normal appearance, in no obvious distress  CV  - normal popliteal pulse  Pulm  - normal respiratory pattern, non-labored  Musculoskeletal - knee  - stance: antalgic gait favoring the left lower extremity, obvious leg length discrepancy  - inspection: normal bone and joint alignment, no obvious deformity. Mild generalized swelling, mild left knee effusion  - palpation: no joint line tenderness, patella and patellar tendon non-tender, lateral and medial joint line tenderness, fullness at the lateral aspect of the popliteal fossa with tenderness  - ROM: Pain 120 degrees flexion, 0 degrees extension,   - strength: 5/5 in flexion, 5/5 in extension  - special tests:  (-) Lachman  (-) anterior drawer  (-) posterior " drawer  (-) Roldan  (-) varus at 0 and 30 degrees flexion  (-) valgus at 0 and 30 degrees flexion  Neuro  - no sensory or motor deficit, grossly normal coordination, normal muscle tone  Skin  - no ecchymosis, erythema, warmth, or induration, no obvious rash  Psych  - interactive, appropriate, normal mood and affect    ASSESSMENT & PLAN  Ms. Hernandez is a 54 year old year old female who presents to clinic today to discuss her ongoing left knee pain and lack of improvement with interventions to date.  Now experiencing locking/catching symptoms on a daily basis.  MRI without clear meniscal tear or loose body. At this point we discussed remaining options which are likely surgical.   I will reach out to one of our sports/cartilage restoration surgeons to discuss any minimally invasive options, but otherwise she may need one of our knee orthopedic surgeons to discuss remaining options.    -Continue  bracing  -Analgesics  -Activity modifications discussed  -Ice therapy  -Follow up PRN    It was a pleasure seeing Jocelin.    Singh Jones DO, CAQSM  Primary Care Sports Medicine

## 2019-02-17 NOTE — TELEPHONE ENCOUNTER
The patient called back about switching surgeons. I explained that Dr. Jones felt that it would be better for her to see Dr. Garay after reviewing her notes again. The patient was agreeable to see Dr. Garay and is now scheduled to see him tomorrow at 9:20 am.

## 2019-02-17 NOTE — TELEPHONE ENCOUNTER
Left VM to call back at walk in number. Dr. Jones would like patient to see either Dr. Garay, Dr. Sky, or Dr. Briceño instead after reviewing her case.

## 2019-02-18 ENCOUNTER — OFFICE VISIT (OUTPATIENT)
Dept: ORTHOPEDICS | Facility: CLINIC | Age: 54
End: 2019-02-18
Payer: COMMERCIAL

## 2019-02-18 VITALS — HEIGHT: 65 IN | BODY MASS INDEX: 29.49 KG/M2 | RESPIRATION RATE: 16 BRPM | WEIGHT: 177 LBS

## 2019-02-18 DIAGNOSIS — M17.12 PRIMARY OSTEOARTHRITIS OF LEFT KNEE: Primary | ICD-10-CM

## 2019-02-18 ASSESSMENT — MIFFLIN-ST. JEOR: SCORE: 1403.75

## 2019-02-18 NOTE — NURSING NOTE
Reason For Visit:   Chief Complaint   Patient presents with     Consult     Left knee pain       ?  No  Occupation: Homemaker  Currently working? No.  Work status?  N/A.  Date of injury: Tore meniscus while walking in 2017  Type of injury: Chronic  Date of surgery: November of 2017  Type of surgery: Meniscus repair at [facility] with Dr. Loaiza  Smoker: No  Request smoking cessation information: No    Has tried, dry needling, physical therapy, acupuncture, off loading brace, CSI, gel injections and meniscus repair without improvement in pain.    Pain Assessment  Patient Currently in Pain: Yes  0-10 Pain Scale: 1(NWB)  Primary Pain Location: Knee

## 2019-02-18 NOTE — LETTER
2/18/2019      RE: Jocelin Hernandez  [address] [facility] [address]       HISTORY OF PRESENT ILLNESS:  This is a 54-year-old female who is in today for an evaluation of her left knee.  The patient is referred by Dr. Singh Jones.  The patient has been diagnosed as having osteoarthritis of her left knee.  The patient is status post an MRI of her left knee as well demonstrating a degenerative medial meniscus tear with also grade 4 chondral wear of her medial femoral condyle with bony edema, in addition.  The patient today reports that she has been having left knee pain for some time.  She has tried some conservative measures, including injections and therapy as well as anti-inflammatants.  The patient still has difficulty with long periods of sitting, stair climbing and walking distances.  Most of her pain is localized over the medial aspect of her knee.  She reports having some swelling as well.  The patient's intake form is reviewed and appears to be accurate.  It was signed off on and scanned into the scanned documents.      PHYSICAL EXAMINATION:  She has range of motion from -2 or -3 degrees to about 35 degrees of flexion.  Stable to varus and valgus stresses.  Pseudo-opening with valgus stress medially.  She has a trace effusion at this time.  She has a negative anterior and posterior drawer.  She has positive pain to palpation of the medial joint line.  No lateral joint line discomfort.  She has no patellofemoral pain at this time, although she does have some discomfort beneath the medial facet of her patella.  The patient is otherwise neurovascularly intact.      IMAGING:  The patient's MRI is as above.  It demonstrates significant wear of her medial compartment along with bony edema of her medial compartment and degenerative meniscal tearing.        IMPRESSION:  I think that my overall impression is that she has degenerative medial compartment osteoarthritis.  I think that if we just try to  do something like an arthroscopy to her medial compartment that she would continue to have discomfort and that it would eventually deteriorate, especially within the next 2 years.  The recommendation at this time to buy her time would be to consider a hemiarthroplasty.  She understands the risks and benefits of the procedure as well as the rehab protocol.  The patient would like to proceed.  We will try and find some time that works for her.  We will be scheduling to get her on the books, either here at the [place] or at [facility].      This patient's office visit was 30 minutes of face-to-face time, of which 20 minutes was counseling.         Miller Garay MD

## 2019-02-18 NOTE — NURSING NOTE
Teaching Flowsheet   Relevant Diagnosis: Left knee hemiarthroplaty  Teaching Topic: Pre-op instructions     Person(s) involved in teaching:   Patient     Motivation Level:  Asks Questions: Yes  Eager to Learn: Yes  Cooperative: Yes  Receptive (willing/able to accept information): Yes  Any cultural factors/Holiness beliefs that may influence understanding or compliance? No  Comments: N/A     Patient demonstrates understanding of the following:  Reason for the appointment, diagnosis and treatment plan: Yes  Knowledge of proper use of medications and conditions for which they are ordered (with special attention to potential side effects or drug interactions): Yes  Which situations necessitate calling provider and whom to contact: Yes    Teaching Concerns Addressed:   Comments: None     Proper use and care of  (medical equip, care aids, etc.): Yes  Nutritional needs and diet plan: Yes  Pain management techniques: Yes  Wound Care: Yes  How and/when to access community resources: Yes     Instructional Materials Used/Given: Pre-operative packet     Time spent with patient: 15 minutes.

## 2019-02-27 NOTE — PROGRESS NOTES
HISTORY OF PRESENT ILLNESS:  This is a 54-year-old female who is in today for an evaluation of her left knee.  The patient is referred by Dr. Singh Jones.  The patient has been diagnosed as having osteoarthritis of her left knee.  The patient is status post an MRI of her left knee as well demonstrating a degenerative medial meniscus tear with also grade 4 chondral wear of her medial femoral condyle with bony edema, in addition.  The patient today reports that she has been having left knee pain for some time.  She has tried some conservative measures, including injections and therapy as well as anti-inflammatants.  The patient still has difficulty with long periods of sitting, stair climbing and walking distances.  Most of her pain is localized over the medial aspect of her knee.  She reports having some swelling as well.  The patient's intake form is reviewed and appears to be accurate.  It was signed off on and scanned into the scanned documents.      PHYSICAL EXAMINATION:  She has range of motion from -2 or -3 degrees to about 35 degrees of flexion.  Stable to varus and valgus stresses.  Pseudo-opening with valgus stress medially.  She has a trace effusion at this time.  She has a negative anterior and posterior drawer.  She has positive pain to palpation of the medial joint line.  No lateral joint line discomfort.  She has no patellofemoral pain at this time, although she does have some discomfort beneath the medial facet of her patella.  The patient is otherwise neurovascularly intact.      IMAGING:  The patient's MRI is as above.  It demonstrates significant wear of her medial compartment along with bony edema of her medial compartment and degenerative meniscal tearing.        IMPRESSION:  I think that my overall impression is that she has degenerative medial compartment osteoarthritis.  I think that if we just try to do something like an arthroscopy to her medial compartment that she would continue to have  discomfort and that it would eventually deteriorate, especially within the next 2 years.  The recommendation at this time to buy her time would be to consider a hemiarthroplasty.  She understands the risks and benefits of the procedure as well as the rehab protocol.  The patient would like to proceed.  We will try and find some time that works for her.  We will be scheduling to get her on the books, either here at the [place] or at [facility].      This patient's office visit was 30 minutes of face-to-face time, of which 20 minutes was counseling.

## 2019-03-02 ENCOUNTER — OFFICE VISIT (OUTPATIENT)
Dept: FAMILY MEDICINE | Facility: CLINIC | Age: 54
End: 2019-03-02
Payer: COMMERCIAL

## 2019-03-02 VITALS
OXYGEN SATURATION: 98 % | HEART RATE: 77 BPM | DIASTOLIC BLOOD PRESSURE: 98 MMHG | TEMPERATURE: 98.8 F | HEIGHT: 65 IN | SYSTOLIC BLOOD PRESSURE: 156 MMHG | RESPIRATION RATE: 16 BRPM | BODY MASS INDEX: 29.91 KG/M2 | WEIGHT: 179.5 LBS

## 2019-03-02 DIAGNOSIS — F43.9 SITUATIONAL STRESS: ICD-10-CM

## 2019-03-02 DIAGNOSIS — I10 BENIGN ESSENTIAL HYPERTENSION: ICD-10-CM

## 2019-03-02 DIAGNOSIS — H69.93 DYSFUNCTION OF BOTH EUSTACHIAN TUBES: Primary | ICD-10-CM

## 2019-03-02 RX ORDER — FLUTICASONE PROPIONATE 50 MCG
2 SPRAY, SUSPENSION (ML) NASAL DAILY
Qty: 1 G | Refills: 11 | COMMUNITY
Start: 2019-03-02 | End: 2019-07-17

## 2019-03-02 RX ORDER — ATENOLOL 25 MG/1
25 TABLET ORAL DAILY
Qty: 30 TABLET | Refills: 1 | Status: SHIPPED | OUTPATIENT
Start: 2019-03-02 | End: 2019-04-29

## 2019-03-02 ASSESSMENT — MIFFLIN-ST. JEOR: SCORE: 1415.09

## 2019-03-02 ASSESSMENT — PAIN SCALES - GENERAL: PAINLEVEL: MILD PAIN (3)

## 2019-03-02 NOTE — NURSING NOTE
"54 year old  Chief Complaint   Patient presents with     Ear Problem     x 4 weeks with ear fullness and x 2 days with bilateral ear pain, but worse in left ear. Pt reports recent procedure for botox in masseter muscle and is concerned it may have spread into ear. Used afrin and sudafed with no relief. Recent flight to [place] when issues began.     Hypertension     concern for multiple elevated BP readings over the last 6 months at various appointments        Blood pressure (!) 156/98, pulse 77, temperature 98.8  F (37.1  C), temperature source Oral, resp. rate 16, height 1.651 m (5' 5\"), weight 81.4 kg (179 lb 8 oz), SpO2 98 %. Body mass index is 29.87 kg/m .  Patient Active Problem List   Diagnosis     Aftercare     Rosacea     Palpitations     Panic attack     Subclinical hypothyroidism     Poor sleep     Iliotibial band syndrome affecting left lower leg       Wt Readings from Last 3 Encounters:   03/02/19 81.4 kg (179 lb 8 oz)   02/18/19 80.3 kg (177 lb)   02/16/19 80.3 kg (177 lb)     BP Readings from Last 6 Encounters:   03/02/19 (!) 156/98   12/09/18 (!) 162/92   11/02/18 (!) 167/103   08/10/18 133/83   07/03/18 134/89   05/21/18 138/84       Current Outpatient Medications   Medication     acetaminophen (TYLENOL) 500 MG tablet     diclofenac (VOLTAREN) 1 % GEL topical gel     progesterone (PROMETRIUM) 100 MG capsule     thyroid (ARMOUR THYROID) 15 MG TABS tablet     thyroid (ARMOUR THYROID) 30 MG tablet     Current Facility-Administered Medications   Medication     hylan (SYNVISC ONE) injection 48 mg     lidocaine 1 % injection 3 mL     lidocaine 1 % injection 4 mL     triamcinolone acetonide (KENALOG-40) injection 40 mg       Social History     Tobacco Use     Smoking status: Never Smoker     Smokeless tobacco: Never Used   Substance Use Topics     Alcohol use: Yes     Alcohol/week: 0.6 oz     Types: 1 Glasses of wine per week     Comment: socially     Drug use: No       Health Maintenance Due   Topic " Date Due     PREVENTIVE CARE VISIT  1965     HIV SCREEN (SYSTEM ASSIGNED)  01/10/1983     HEPATITIS C SCREENING  01/10/1983     DTAP/TDAP/TD IMMUNIZATION (1 - Tdap) 01/10/1990     LIPID SCREEN Q5 YR FEMALE (SYSTEM ASSIGNED)  01/10/2010     ZOSTER IMMUNIZATION (1 of 2) 01/10/2015     INFLUENZA VACCINE (1) 09/01/2018     Mental Health Tx Plan Q11 MOS  09/17/2018     MAMMO SCREEN Q2 YR (SYSTEM ASSIGNED)  01/01/2019       No results found for: GRIFFIN King, GERONIMO  March 2, 2019 9:13 AM

## 2019-03-02 NOTE — PROGRESS NOTES
"Jocelin Hernandez is a 54 year old woman who is a patient of Dr. Stock. She is here for the following issues:    Ear fullness  Jocelin reports 2 wk hx of bilateral (L>R) ear pressure. This began just after she was on an airline flight. She travels a lot and this is not usually a problem. She tried using Afrin nasal spray and sudafed without relief. She denies sinus pressure, congestion, sore throat or cough. No ear pain.  No fever. She has hx of TMJ and receives Botox injections, wondering if this could have triggered to ear fullness. Last injection was last week of January.     Elevated blood pressure  BP Readings from Last 3 Encounters:   03/02/19 (!) 156/98   12/09/18 (!) 162/92   11/02/18 (!) 167/103   Jocelin's blood pressure has been elevated for several months. She has never been treated. Reports she has gained about 30 pounds over the past few years. Nonsmoker, 1-2 caffeinated drinks per day.   Etoh: 2x per week, 2 at a time    Hormonal issues  Jocelin reports previous history of hypothyroidism and adrenal issues. She is currently under the care of Dr. Anderson to adjust hormones. She had previously been on Wallace thyroid but stopped it recently when it was triggereing hot flashes.       Patient Active Problem List   Diagnosis     Aftercare     Rosacea     Palpitations     Panic attack     Subclinical hypothyroidism     Poor sleep     Iliotibial band syndrome affecting left lower leg       Current Outpatient Medications   Medication Sig Dispense Refill     acetaminophen (TYLENOL) 500 MG tablet Take 1,000 mg by mouth         Allergies   Allergen Reactions     Hydrocortisone      Molds & Smuts      No Clinical Screening - See Comments Other (See Comments)     Seasonal Allergies         EXAM  BP (!) 156/98 (BP Location: Right arm, Patient Position: Sitting, Cuff Size: Adult Regular)   Pulse 77   Temp 98.8  F (37.1  C) (Oral)   Resp 16   Ht 1.651 m (5' 5\")   Wt 81.4 kg (179 lb 8 oz)   SpO2 98%   BMI " 29.87 kg/m    Gen: Alert, pleasant, NAD  HEENT:  Conjunctiva nl, TM normal bilaterally, nasal turbinates swollen, L>R. OP clear, no posterior erythema  COR: S1,S2, no murmur  Lungs: CTA bilaterally, no rhonchi, wheezes or rales  Ext: no peripheral edema, pulses full      Assessment:  (H69.83) Dysfunction of both eustachian tubes  (primary encounter diagnosis)  Comment: L>R  Plan: fluticasone (FLONASE) 50 MCG/ACT nasal spray        Recommend fluticasone nasal spray for symptoms.     (I10) Benign essential hypertension  Comment: blood pressure is hgih today  Plan: atenolol (TENORMIN) 25 MG tablet        Return to cllinic in 2 wks. Hopeful that medication will lower anxiety as well.    (F43.9) Situational stress  Comment: ongoing stressors, likely contributing to elevated BP  Plan: declines resources for counseling at this time.     Total visit time today 40 minutes.  More than 50% of the time was spent in counseling and coordination of care.     Debbie Grider MD  Internal Medicine/Pediatrics

## 2019-04-03 ENCOUNTER — OFFICE VISIT (OUTPATIENT)
Dept: FAMILY MEDICINE | Facility: CLINIC | Age: 54
End: 2019-04-03
Payer: COMMERCIAL

## 2019-04-03 VITALS
DIASTOLIC BLOOD PRESSURE: 86 MMHG | HEIGHT: 65 IN | SYSTOLIC BLOOD PRESSURE: 131 MMHG | TEMPERATURE: 98.6 F | BODY MASS INDEX: 29.32 KG/M2 | OXYGEN SATURATION: 96 % | HEART RATE: 81 BPM | WEIGHT: 176 LBS

## 2019-04-03 DIAGNOSIS — J30.2 SEASONAL ALLERGIC RHINITIS, UNSPECIFIED TRIGGER: ICD-10-CM

## 2019-04-03 DIAGNOSIS — R11.2 POST-OPERATIVE NAUSEA AND VOMITING: ICD-10-CM

## 2019-04-03 DIAGNOSIS — I10 BENIGN ESSENTIAL HYPERTENSION: ICD-10-CM

## 2019-04-03 DIAGNOSIS — Z01.818 PREOP GENERAL PHYSICAL EXAM: Primary | ICD-10-CM

## 2019-04-03 DIAGNOSIS — Z13.220 LIPID SCREENING: ICD-10-CM

## 2019-04-03 DIAGNOSIS — Z98.890 POST-OPERATIVE NAUSEA AND VOMITING: ICD-10-CM

## 2019-04-03 LAB
% GRANULOCYTES: 50.2 %G (ref 40–75)
ANION GAP SERPL CALCULATED.3IONS-SCNC: 9 MMOL/L (ref 3–14)
BUN SERPL-MCNC: 16 MG/DL (ref 7–30)
CALCIUM SERPL-MCNC: 8.9 MG/DL (ref 8.5–10.1)
CHLORIDE SERPL-SCNC: 104 MMOL/L (ref 94–109)
CO2 SERPL-SCNC: 28 MMOL/L (ref 20–32)
CREAT SERPL-MCNC: 0.67 MG/DL (ref 0.52–1.04)
ERYTHROCYTE [DISTWIDTH] IN BLOOD BY AUTOMATED COUNT: 11.9 %
GFR SERPL CREATININE-BSD FRML MDRD: >90 ML/MIN/{1.73_M2}
GLUCOSE SERPL-MCNC: 95 MG/DL (ref 70–99)
GRANULOCYTES #: 2.5 K/UL (ref 1.6–8.3)
HCT VFR BLD AUTO: 42.3 % (ref 35–47)
HEMOGLOBIN: 14.4 G/DL (ref 11.7–15.7)
INR PPP: 1
LYMPHOCYTES # BLD AUTO: 1.9 K/UL (ref 0.8–5.3)
LYMPHOCYTES NFR BLD AUTO: 38.2 %L (ref 20–48)
MCH RBC QN AUTO: 34.2 PG (ref 26.5–35)
MCHC RBC AUTO-ENTMCNC: 34 G/DL (ref 32–36)
MCV RBC AUTO: 100.5 FL (ref 78–100)
MID #: 0.6 K/UL (ref 0–2.2)
MID %: 11.6 %M (ref 0–20)
PLATELET # BLD AUTO: 182 K/UL (ref 150–450)
POTASSIUM SERPL-SCNC: 4 MMOL/L (ref 3.4–5.3)
RBC # BLD AUTO: 4.21 M/UL (ref 3.8–5.2)
SODIUM SERPL-SCNC: 141 MMOL/L (ref 133–144)
WBC # BLD AUTO: 5 K/UL (ref 4–11)

## 2019-04-03 RX ORDER — AZELASTINE 1 MG/ML
2 SPRAY, METERED NASAL 2 TIMES DAILY
Qty: 1 BOTTLE | Refills: 11 | COMMUNITY
Start: 2019-04-03

## 2019-04-03 RX ORDER — CEFDINIR 300 MG/1
300 CAPSULE ORAL 2 TIMES DAILY
Qty: 20 CAPSULE | Refills: 0 | COMMUNITY
Start: 2019-04-02 | End: 2019-07-17

## 2019-04-03 RX ORDER — FLUTICASONE PROPIONATE 50 MCG
1 SPRAY, SUSPENSION (ML) NASAL DAILY
Qty: 16 G | Refills: 11 | COMMUNITY
Start: 2019-04-03 | End: 2021-03-16

## 2019-04-03 RX ORDER — THYROID 15 MG/1
15 TABLET ORAL DAILY
Qty: 30 TABLET | Refills: 11 | COMMUNITY
Start: 2019-04-03 | End: 2019-07-17

## 2019-04-03 ASSESSMENT — MIFFLIN-ST. JEOR: SCORE: 1399.21

## 2019-04-03 NOTE — PROGRESS NOTES
[facility]  [address], [address]  [facility] [place] [address]  [phone]  Dept: [phone]    PRE-OP EVALUATION:  Today's date: 4/3/2019    Jocelin Hernandez (: 1965) presents for pre-operative evaluation assessment as requested by Dr. Garay.  She requires evaluation and anesthesia risk assessment prior to undergoing surgery/procedure for treatment of Left Partial Knee Replacement .    Proposed Surgery/ Procedure: Left Partial Knee Replacement  Date of Surgery/ Procedure: 19  Time of Surgery/ Procedure: 9:30 AM  Hospital/Surgical Facility: [facility]  Fax number for surgical facility: [phone]  Primary Physician: Nola Nielson  Type of Anesthesia Anticipated: General    Patient has a Health Care Directive or Living Will:  NO    1. NO - Do you have a history of heart attack, stroke, stent, bypass or surgery on an artery in the head, neck, heart or legs?  2. NO - Do you ever have any pain or discomfort in your chest?  3. NO - Do you have a history of  Heart Failure?  4. NO - Are you troubled by shortness of breath when: walking on the level, up a slight hill or at night?  5. NO - Do you currently have a cold, bronchitis or other respiratory infection?  6. YES - Do you have a cough, shortness of breath or wheezing? Mild cough, no hx of asthma.   7. NO - Do you sometimes get pains in the calves of your legs when you walk?  8. NO - Do you or anyone in your family have previous history of blood clots?   9. NO - Do you or does anyone in your family have a serious bleeding problem such as prolonged bleeding following surgeries or cuts?  10. NO - Have you ever had problems with anemia or been told to take iron pills?  11. NO - Have you had any abnormal blood loss such as black, tarry or bloody stools, or abnormal vaginal bleeding?  12. NO - Have you ever had a blood transfusion?  13. YES - Have you or any of your relatives ever had problems with anesthesia?  Nausea  14. NO - Do you have sleep apnea, excessive snoring or daytime drowsiness?  15. NO - Do you have any prosthetic heart valves?  16. NO - Do you have prosthetic joints?  17. NO - Is there any chance that you may be pregnant?      HPI:     HPI related to upcoming procedure   Jocelin is a 53 yo here for preoperative assessment. She is having elective LEFT hemiarthoplasty. She tore her LEFT meniscus walking and had a  November 2017. She continues to have LEFT knee pain with swelling. She has tried hyaluronic acid injections x2, most recently 12/13/18 with Dr. Jones. She is going to proceed with elective LEFT hemiarthoplasty.     Jocelin is a healthy individual, without history of heart dx,lung dx, or DM. Nonsmoker. No hx of sleep apnea. No personal or family hx of bleeding or clotting disorders.  No personal or family history of intolerance to anesthesia. She has post-op nausea with anesthesia.     Hypertension  Jocelin was started on atenolol 25 mg daily 3/02/19 for treatment of hypertension. Blood pressure looks much better today and she is having no issues with the medication. Nonsmoker.   BP Readings from Last 3 Encounters:   04/03/19 131/86   03/02/19 (!) 156/98   12/09/18 (!) 162/92       HRT   Jocelin has an implanted estrogen and testerone pellet in her left hip for treatment of hot flashes. She is unsure of the dose. HRT was placed 3 weeks ago by Dr. Wilda Chavarria, ob/gyn. No hx of blood clots. Nonsmoker.     Sinus infection  She is currently being treated with cefdinir 300 mg x 10 days for a sinus infection. She is on day 2/10 and feeling better.     MEDICAL HISTORY:     Patient Active Problem List    Diagnosis Date Noted     Iliotibial band syndrome affecting left lower leg 11/23/2018     Priority: Medium     Subclinical hypothyroidism 08/10/2018     Priority: Medium     Poor sleep 08/10/2018     Priority: Medium     Palpitations 07/03/2018     Priority: Medium     Panic attack 07/03/2018      Priority: Medium     Rosacea 05/22/2018     Priority: Medium     Overview:   Created by Conversion       Aftercare 03/20/2018     Priority: Medium      Past Medical History:   Diagnosis Date     Hypertension      Hypothyroid      Palpitations      Pneumonia      Shortness of breath      Past Surgical History:   Procedure Laterality Date     KNEE SURGERY Left 11/2017    meniscal repair     Current Outpatient Medications   Medication Sig Dispense Refill     acetaminophen (TYLENOL) 500 MG tablet Take 1,000 mg by mouth       atenolol (TENORMIN) 25 MG tablet Take 1 tablet (25 mg) by mouth daily 30 tablet 1     azelastine (ASTELIN) 0.1 % nasal spray Spray 2 sprays into both nostrils 2 times daily 1 Bottle 11     cefdinir (OMNICEF) 300 MG capsule Take 1 capsule (300 mg) by mouth 2 times daily 20 capsule 0     estrogen conj-synthetic B (ENJUVIA) 0.625 MG tablet Take 1 tablet (0.625 mg) by mouth daily IMPLANTED ESTROGEN AND TESTOSTERONE PELLET, LEFT HIP 1 tablet 0     fluticasone (FLONASE) 50 MCG/ACT nasal spray Spray 1 spray into both nostrils daily 16 g 11     fluticasone (FLONASE) 50 MCG/ACT nasal spray Spray 2 sprays into both nostrils daily 1 g 11     NP THYROID PO        Progesterone Micronized (PROGESTERONE PO) Take 200 mg by mouth       thyroid (NP THYROID) 15 MG tablet Take 1 tablet (15 mg) by mouth daily Using 0.5grain daily 30 tablet 11     OTC products: None, except as noted above    Allergies   Allergen Reactions     Hydrocortisone      Molds & Smuts      No Clinical Screening - See Comments Other (See Comments)     Seasonal Allergies       Latex Allergy: NO    Social History     Tobacco Use     Smoking status: Never Smoker     Smokeless tobacco: Never Used   Substance Use Topics     Alcohol use: Yes     Alcohol/week: 0.6 oz     Types: 1 Glasses of wine per week     Comment: socially     History   Drug Use No       REVIEW OF SYSTEMS:   Constitutional, neuro, ENT, endocrine, pulmonary, cardiac, gastrointestinal,  "genitourinary, musculoskeletal, integument and psychiatric systems are negative, except as otherwise noted.    EXAM:   /86   Pulse 81   Temp 98.6  F (37  C) (Oral)   Ht 1.651 m (5' 5\")   Wt 79.8 kg (176 lb)   SpO2 96%   BMI 29.29 kg/m      GENERAL APPEARANCE: healthy, alert and no distress, overweight     EYES: EOMI, PERRL     HENT: ear canals and TM's normal and nose and mouth without ulcers or lesions     NECK: no adenopathy, thyroid normal to palpation     RESP: lungs clear to auscultation - no rales, rhonchi or wheezes     CV: regular rates and rhythm, normal S1 S2, no S3 or S4 and no murmur, click or rub     ABDOMEN:  soft, nontender, no HSM or masses and bowel sounds normal     MS: extremities normal- no gross deformities noted, no evidence of inflammation in joints, FROM in all extremities. Mild swelling at the left medial knee     SKIN: no suspicious lesions or rashes     NEURO: Normal strength and tone, sensory exam grossly normal, mentation intact and speech normal     PSYCH: mentation appears normal. and affect normal/bright     LYMPHATICS: No cervical adenopathy  Extremities: No edema    DIAGNOSTICS:   EKG: (4/3/19) No significant abnormalities. NSR, rate 76,  Nonspecific changes, inverted T in III and flat in aVF. No other ST-T changes,  No LVH. Read by Debbie Grider MD  Internal Medicine/Pediatrics    Labs   Results for orders placed or performed in visit on 04/03/19   CBC with Diff Plt ([facility])   Result Value Ref Range    WBC 5.0 4.0 - 11.0 K/uL    Lymphocytes # 1.9 0.8 - 5.3 K/uL    % Lymphocytes 38.2 20.0 - 48.0 %L    Mid # 0.6 0.0 - 2.2 K/uL    Mid % 11.6 0.0 - 20.0 %M    GRANULOCYTES # 2.5 1.6 - 8.3 K/uL    % Granulocytes 50.2 40.0 - 75.0 %G    RBC 4.21 3.80 - 5.20 M/uL    Hemoglobin 14.4 11.7 - 15.7 g/dL    Hematocrit 42.3 35.0 - 47.0 %    .5 (H) 78.0 - 100.0 fL    MCH 34.2 26.5 - 35.0 pg    MCHC 34.0 32.0 - 36.0 g/dL    Platelets 182.0 150.0 - 450.0 K/uL    RDW 11.9 %   Basic " metabolic panel   Result Value Ref Range    Sodium 141 133 - 144 mmol/L    Potassium 4.0 3.4 - 5.3 mmol/L    Chloride 104 94 - 109 mmol/L    Carbon Dioxide 28 20 - 32 mmol/L    Anion Gap 9 3 - 14 mmol/L    Glucose 95 70 - 99 mg/dL    Urea Nitrogen 16 7 - 30 mg/dL    Creatinine 0.67 0.52 - 1.04 mg/dL    GFR Estimate >90 >60 mL/min/[1.73_m2]    GFR Estimate If Black >90 >60 mL/min/[1.73_m2]    Calcium 8.9 8.5 - 10.1 mg/dL   INR ([facility])   Result Value Ref Range    INR 1.0        IMPRESSION:   (Z01.818) Preop general physical exam  (primary encounter diagnosis)  Comment: elective LEFT knee hemiarthoplasty  Plan: EKG 12-lead complete w/read - Clinics, CBC with        Diff Plt ([facility]), Basic metabolic panel, INR         ([facility]), CANCELED: INR        No contraindication to surgery. Proceed as planned. As she has estrogen implant, discussed consideration of aspirin 325mg post operatively until ambulating well.    (Z13.220) Lipid screening  Comment: Not fasting today, but wishes to return for fasting lipid screen  Plan: Lipid Profile-future    (R11.2,  Z98.890) Post-operative nausea and vomiting  Comment: previous history nausea and vomiting   Plan: Consider zofran in post op care    (I10) Benign essential hypertension  Comment: well controlled   Plan: Take medication prior to surgery with a sip of water      The proposed surgical procedure is considered INTERMEDIATE risk.    REVISED CARDIAC RISK INDEX  The patient has the following serious cardiovascular risks for perioperative complications such as (MI, PE, VFib and 3  AV Block):  No serious cardiac risks  INTERPRETATION: 0 risks: Class I (very low risk - 0.4% complication rate)    The patient has the following additional risks for perioperative complications:  No identified additional risks      ICD-10-CM    1. Preop general physical exam Z01.818 EKG 12-lead complete w/read - Clinics     CBC with Diff Plt ([facility])     Basic metabolic panel     INR ([facility])      CANCELED: INR   2. Lipid screening Z13.220    3. Seasonal allergic rhinitis, unspecified trigger J30.2 Lipid Profile   4. Post-operative nausea and vomiting R11.2     Z98.890    5. Benign essential hypertension I10        RECOMMENDATIONS:       Pt is instructed to hold any vitamins, herbs, supplements and NSAIDs 10 days prior to surgery. She may take any prescription medications with a small sip of water on the morning of the procedure.       APPROVAL GIVEN to proceed with proposed procedure, without further diagnostic evaluation       Signed Electronically by: Debbie Grider MD    Copy of this evaluation report is provided to requesting physician.    [facility] Preop Guidelines    Revised Cardiac Risk Index

## 2019-04-03 NOTE — NURSING NOTE
"54 year old  Chief Complaint   Patient presents with     Pre-Op Exam     partal left knee replacement     Results     pt had blood work done 3/5/2019 and some labs were abnormal       Blood pressure 131/86, pulse 81, temperature 98.6  F (37  C), temperature source Oral, height 1.651 m (5' 5\"), weight 79.8 kg (176 lb), SpO2 96 %. Body mass index is 29.29 kg/m .  Patient Active Problem List   Diagnosis     Aftercare     Rosacea     Palpitations     Panic attack     Subclinical hypothyroidism     Poor sleep     Iliotibial band syndrome affecting left lower leg       Wt Readings from Last 2 Encounters:   04/03/19 79.8 kg (176 lb)   03/02/19 81.4 kg (179 lb 8 oz)     BP Readings from Last 3 Encounters:   04/03/19 131/86   03/02/19 (!) 156/98   12/09/18 (!) 162/92         Current Outpatient Medications   Medication     NP THYROID PO     Progesterone Micronized (PROGESTERONE PO)     acetaminophen (TYLENOL) 500 MG tablet     atenolol (TENORMIN) 25 MG tablet     fluticasone (FLONASE) 50 MCG/ACT nasal spray     Current Facility-Administered Medications   Medication     hylan (SYNVISC ONE) injection 48 mg     lidocaine 1 % injection 3 mL     lidocaine 1 % injection 4 mL     triamcinolone acetonide (KENALOG-40) injection 40 mg       Social History     Tobacco Use     Smoking status: Never Smoker     Smokeless tobacco: Never Used   Substance Use Topics     Alcohol use: Yes     Alcohol/week: 0.6 oz     Types: 1 Glasses of wine per week     Comment: socially     Drug use: No       Health Maintenance Due   Topic Date Due     PREVENTIVE CARE VISIT  1965     HIV SCREEN (SYSTEM ASSIGNED)  01/10/1983     HEPATITIS C SCREENING  01/10/1983     LIPID SCREEN Q5 YR FEMALE (SYSTEM ASSIGNED)  01/10/2010     ZOSTER IMMUNIZATION (1 of 2) 01/10/2015     Mental Health Tx Plan Q11 MOS  09/17/2018     MAMMO SCREEN Q2 YR (SYSTEM ASSIGNED)  01/01/2019       No results found for: PAP      April 3, 2019 9:20 AM  "

## 2019-04-29 DIAGNOSIS — I10 BENIGN ESSENTIAL HYPERTENSION: ICD-10-CM

## 2019-04-29 RX ORDER — ATENOLOL 25 MG/1
25 TABLET ORAL DAILY
Qty: 90 TABLET | Refills: 3 | Status: SHIPPED | OUTPATIENT
Start: 2019-04-29 | End: 2020-01-10

## 2019-04-29 NOTE — TELEPHONE ENCOUNTER
Last office visit was on 04/03/2019, no future visits scheduled.    Prescription approved per [facility] Refill Protocol.  Migdalia Tucker RN  [facility]

## 2019-05-24 ENCOUNTER — TELEPHONE (OUTPATIENT)
Dept: OTHER | Facility: OUTPATIENT CENTER | Age: 54
End: 2019-05-24

## 2019-05-24 NOTE — TELEPHONE ENCOUNTER
LVM informing patient group is restarting in June. Group will be the 3rd Wednesday of the month and go from 4-6pm. Asked patient to call back if interested.

## 2019-06-17 NOTE — TELEPHONE ENCOUNTER
Called patient to followup on call from May to see if patient would like to restart the group. Asked patient to call back if she is interested.

## 2019-07-11 PROBLEM — M76.32 ILIOTIBIAL BAND SYNDROME AFFECTING LEFT LOWER LEG: Status: RESOLVED | Noted: 2018-11-23 | Resolved: 2019-07-11

## 2019-07-16 ENCOUNTER — NURSE TRIAGE (OUTPATIENT)
Dept: NURSING | Facility: CLINIC | Age: 54
End: 2019-07-16

## 2019-07-16 NOTE — TELEPHONE ENCOUNTER
Calling about B/P checked at [facility] of 156/94.  Jocelin has not missed any doses of her Atenolol.  Per care advise to see PCP with in 2 weeks.  Transferred to scheduling to make an appointment.  Jocelin will send her provider a message through my chart.  Kacey Whitehead RN, [facility]      Reason for Disposition    [1] Systolic BP  >= 130 OR Diastolic >= 80 AND [2] taking BP medications    Additional Information    Negative: Difficult to awaken or acting confused (e.g., disoriented, slurred speech)    Negative: Severe difficulty breathing (e.g., struggling for each breath, speaks in single words)    Negative: [1] Weakness of the face, arm or leg on one side of the body AND [2] new onset    Negative: [1] Numbness (i.e., loss of sensation) of the face, arm or leg on one side of the body AND [2] new onset    Negative: [1] Chest pain lasts > 5 minutes AND [2] history of heart disease  (i.e., heart attack, bypass surgery, angina, angioplasty, CHF)    Negative: [1] Chest pain AND [2] took nitrogylcerin AND [3] pain was not relieved    Negative: Sounds like a life-threatening emergency to the triager    Negative: [1] Systolic BP  >= 180 OR Diastolic >= 110 AND [2] missed most recent dose of blood pressure medication    Negative: [1] Systolic BP  >= 200 OR Diastolic >= 120  AND [2] having NO cardiac or neurologic symptoms    Negative: [1] Postpartum < 6 weeks AND [2] BP Systolic BP  >= 140 OR Diastolic >= 90    Negative: [1] Systolic BP  >= 160 OR Diastolic >= 100 AND [2] cardiac or neurologic symptoms (e.g., chest pain, difficulty breathing, unsteady gait, blurred vision)    Negative: [1] Pregnant > 20 weeks AND [2] new hand or face swelling    Negative: [1] Pregnant > 20 weeks AND [2] BP Systolic BP  >= 140 OR Diastolic >= 90    Negative: Systolic BP  >= 180 OR Diastolic >= 110    Negative: Ran out of BP medications    Negative: Systolic BP  >= 160 OR Diastolic >= 100    Negative: [1] Taking BP medications  AND [2] feels is having side effects (e.g., impotence, cough, dizzy upon standing)    Negative: [1] Systolic BP  >= 130 OR Diastolic >= 80 AND [2] pregnant    Protocols used: HIGH BLOOD PRESSURE-A-AH

## 2019-07-17 ENCOUNTER — OFFICE VISIT (OUTPATIENT)
Dept: FAMILY MEDICINE | Facility: CLINIC | Age: 54
End: 2019-07-17
Payer: COMMERCIAL

## 2019-07-17 VITALS
BODY MASS INDEX: 28.32 KG/M2 | OXYGEN SATURATION: 99 % | SYSTOLIC BLOOD PRESSURE: 148 MMHG | WEIGHT: 170 LBS | DIASTOLIC BLOOD PRESSURE: 84 MMHG | HEART RATE: 66 BPM | HEIGHT: 65 IN

## 2019-07-17 DIAGNOSIS — R19.5 LOOSE STOOLS: ICD-10-CM

## 2019-07-17 DIAGNOSIS — F41.9 ANXIETY: ICD-10-CM

## 2019-07-17 DIAGNOSIS — I10 BENIGN ESSENTIAL HYPERTENSION: Primary | ICD-10-CM

## 2019-07-17 ASSESSMENT — PATIENT HEALTH QUESTIONNAIRE - PHQ9
SUM OF ALL RESPONSES TO PHQ QUESTIONS 1-9: 4
5. POOR APPETITE OR OVEREATING: MORE THAN HALF THE DAYS

## 2019-07-17 ASSESSMENT — ANXIETY QUESTIONNAIRES
1. FEELING NERVOUS, ANXIOUS, OR ON EDGE: SEVERAL DAYS
2. NOT BEING ABLE TO STOP OR CONTROL WORRYING: SEVERAL DAYS
GAD7 TOTAL SCORE: 9
5. BEING SO RESTLESS THAT IT IS HARD TO SIT STILL: NOT AT ALL
3. WORRYING TOO MUCH ABOUT DIFFERENT THINGS: SEVERAL DAYS
IF YOU CHECKED OFF ANY PROBLEMS ON THIS QUESTIONNAIRE, HOW DIFFICULT HAVE THESE PROBLEMS MADE IT FOR YOU TO DO YOUR WORK, TAKE CARE OF THINGS AT HOME, OR GET ALONG WITH OTHER PEOPLE: NOT DIFFICULT AT ALL
7. FEELING AFRAID AS IF SOMETHING AWFUL MIGHT HAPPEN: MORE THAN HALF THE DAYS
6. BECOMING EASILY ANNOYED OR IRRITABLE: MORE THAN HALF THE DAYS

## 2019-07-17 ASSESSMENT — MIFFLIN-ST. JEOR: SCORE: 1371.99

## 2019-07-17 NOTE — PROGRESS NOTES
"Jocelin Hernandez is a 54 year old female here for the following issues:    Hypertension Follow-Up  Jocelin is currently taking 25 mg atenolol daily to treat hypertension.  She is here for BP check. She reported to the nurse triage yesterday that she checked her blood pressure at home with a reading of 200/150, then went to [facility], and had a reading of 156/94.  She told the nurse triage she has been compliant with her medications.  Today, she reports home SBP readings are below 140's. Denies chest pain, but she gets palpitations in stressful situations. She reports situational stress, anxiety. (see below).    BP Readings from Last 3 Encounters:   07/17/19 153/89   04/03/19 131/86   03/02/19 (!) 156/98     Partial Left Knee Replacement  Jocelin had a left partial knee replacement on 4/11/2019, with Dr. Garay.  Today, she reports her knee is doing well, and notes some swelling, particularly after activity.  She is still having some pain, in a different location prior to her surgery.  She has been in contact with her surgeon, and has a follow up appointment next Monday.    Anxiety  Jocelin reports longstanding history of anxiety, panic, related to situational stress. She and spouse are in couples counseling weekly (for the past 3 yrs). She describes her spouse as having a personality disorder which she suspects is narcissism. She attends individual counseling weekly with a separate provider. She has 2 children, ages 16 and 24, both which recently had nasal surgery for deviated septum. She is doing a house renovation that is \"likely to take at least a year and has been in progress for several months\". She reports she was seen by a psychiatrist in the past that gave her a trial of Fluoxetine and possibly Sertraline but both medications were used only briefly as she had diarrhea that led to incontinence. She had also been tried on Wellbutrin, cannot recall side effects or if it worked.     She reports hx of panic attacks " with palpitations and notes these are much improved with addition of atenolol.  She reports sometimes drinking alcohol to soothe nerves.     PHQ9 score = 4  ODESSA 7 score = 9    History of diarrhea  She reports significant diarrhea that she thinks was a side effect of SSRI medication and symptoms improved with stopping medication. She also thinks she may have lactose intolerance but does not take lactaid tablets. We discussed switching to Lactaid brand dairy products to start or use of lactaid tablet trial to see if this would help. She reports she generally tends toward loose stools and asks if she can take imodium.     Addendum (after pt left appt)  Review of previous clinic notes with Dr. Nisreen hernandez to hx of irritable bowel and work up with [facility] with possible colonoscopy in 2018. Colonoscopy report not found in chart. Sent my chart message to so we can arrange for VANESA to get this information.    Patient Active Problem List   Diagnosis     Aftercare     Rosacea     Palpitations     Panic attack     Subclinical hypothyroidism     Poor sleep       Current Outpatient Medications   Medication Sig Dispense Refill     acetaminophen (TYLENOL) 500 MG tablet Take 1,000 mg by mouth       atenolol (TENORMIN) 25 MG tablet Take 1 tablet (25 mg) by mouth daily 90 tablet 3     azelastine (ASTELIN) 0.1 % nasal spray Spray 2 sprays into both nostrils 2 times daily 1 Bottle 11     cefdinir (OMNICEF) 300 MG capsule Take 1 capsule (300 mg) by mouth 2 times daily 20 capsule 0     estrogen conj-synthetic B (ENJUVIA) 0.625 MG tablet Take 1 tablet (0.625 mg) by mouth daily IMPLANTED ESTROGEN AND TESTOSTERONE PELLET, LEFT HIP 1 tablet 0     fluticasone (FLONASE) 50 MCG/ACT nasal spray Spray 1 spray into both nostrils daily 16 g 11     fluticasone (FLONASE) 50 MCG/ACT nasal spray Spray 2 sprays into both nostrils daily 1 g 11     NP THYROID PO        Progesterone Micronized (PROGESTERONE PO) Take 200 mg by mouth        "thyroid (NP THYROID) 15 MG tablet Take 1 tablet (15 mg) by mouth daily Using 0.5grain daily 30 tablet 11       Allergies   Allergen Reactions     Hydrocortisone      Molds & Smuts      No Clinical Screening - See Comments Other (See Comments)     Seasonal Allergies         EXAM  /89   Pulse 66   Ht 1.651 m (5' 5\")   Wt 77.1 kg (170 lb)   SpO2 99%   BMI 28.29 kg/m    Gen: Alert, pleasant, NAD  COR: S1,S2, no murmur  Lungs: CTA bilaterally, no rhonchi, wheezes or rales  Ext: no peripheral edema, pulses full  MS: well healed vertical incision over left patella, mild edema, no warmth or redness    Assessment:  (I10) Benign essential hypertension  (primary encounter diagnosis)  Comment: Blood pressure above target range today  Plan:   Patient Instructions   Bring in blood pressure machine to compare readings.    Atenolol 25mg in morning.    Goal top <140  Bottom <90    For example today, take an additional 25mg in the afternoon if BP above target range.    (R19.5) Loose stools  Comment: pt reports baseline of looser stools, made worse with SSRI use, possibly dairy  Plan: recommend trial of Lactaid milk products or use of lactaid tablets, will try to obtain VANESA from [facility] at next clinic visit.    (F41.9) Anxiety  Comment: significant situational stress, marital stress, currently in counseling. Failed SSRI use in past due to diarrhea causing incontinence.  Plan: discussed working up issue of loose stools, gather more information, as unclear if this were side effect of medication or diarrhea triggered from significant stress. Discussed coping skills, work with counselor. Delegating duties at home to children when possible. Emphasized need for sound sleep, avoidance of using alcohol to self treat and other self cares.    Total visit time today 40 minutes.  More than 50% of the time was spent in counseling and coordination of care.    Debbie Grider MD  Internal Medicine/Pediatrics      Orion BEGUM " Marek, am serving as a scribe to document services personally performed by Dr. Debbie Grider, based on data collection and the provider's statements to me. Dr. Grider has reviewed, edited, and approved the above note.

## 2019-07-17 NOTE — NURSING NOTE
"54 year old  Chief Complaint   Patient presents with     RECHECK     follow up on blood pressure pt reports yesterday her BP was at stroke level.       Blood pressure 153/89, pulse 66, height 1.651 m (5' 5\"), weight 77.1 kg (170 lb), SpO2 99 %. Body mass index is 28.29 kg/m .  Patient Active Problem List   Diagnosis     Aftercare     Rosacea     Palpitations     Panic attack     Subclinical hypothyroidism     Poor sleep       Wt Readings from Last 2 Encounters:   07/17/19 77.1 kg (170 lb)   04/03/19 79.8 kg (176 lb)     BP Readings from Last 3 Encounters:   07/17/19 153/89   04/03/19 131/86   03/02/19 (!) 156/98         Current Outpatient Medications   Medication     acetaminophen (TYLENOL) 500 MG tablet     atenolol (TENORMIN) 25 MG tablet     azelastine (ASTELIN) 0.1 % nasal spray     cefdinir (OMNICEF) 300 MG capsule     estrogen conj-synthetic B (ENJUVIA) 0.625 MG tablet     fluticasone (FLONASE) 50 MCG/ACT nasal spray     fluticasone (FLONASE) 50 MCG/ACT nasal spray     NP THYROID PO     Progesterone Micronized (PROGESTERONE PO)     thyroid (NP THYROID) 15 MG tablet     Current Facility-Administered Medications   Medication     hylan (SYNVISC ONE) injection 48 mg     lidocaine 1 % injection 3 mL     lidocaine 1 % injection 4 mL     triamcinolone acetonide (KENALOG-40) injection 40 mg       Social History     Tobacco Use     Smoking status: Never Smoker     Smokeless tobacco: Never Used   Substance Use Topics     Alcohol use: Yes     Alcohol/week: 0.6 oz     Types: 1 Glasses of wine per week     Comment: socially     Drug use: No       Health Maintenance Due   Topic Date Due     PREVENTIVE CARE VISIT  1965     HEPATITIS C SCREENING  1965     MENTAL HEALTH TX PLAN  1965     HIV SCREENING  01/10/1980     LIPID  01/10/2010     ZOSTER IMMUNIZATION (1 of 2) 01/10/2015     MAMMO SCREENING  01/01/2019       No results found for: PAP      July 17, 2019 3:00 PM  "

## 2019-07-17 NOTE — PATIENT INSTRUCTIONS
Bring in blood pressure machine to compare readings.    Atenolol 25mg in morning.    Goal top <140  Bottom <90    For example today, take an additional 25mg in the afternoon if BP above target range.

## 2019-07-18 ASSESSMENT — ANXIETY QUESTIONNAIRES: GAD7 TOTAL SCORE: 9

## 2019-07-18 NOTE — PROGRESS NOTES
Order(s) created erroneously. Erroneous order ID: [id number]   Order canceled by: ADDY JIMENEZ   Order cancel date/time: 07/18/2019 7:52 AM

## 2019-07-18 NOTE — PROGRESS NOTES
Order(s) created erroneously. Erroneous order ID: [id number]   Order canceled by: ADDY JIMENEZ   Order cancel date/time: 07/18/2019 7:52 AM

## 2019-07-31 ENCOUNTER — OFFICE VISIT (OUTPATIENT)
Dept: FAMILY MEDICINE | Facility: CLINIC | Age: 54
End: 2019-07-31
Payer: COMMERCIAL

## 2019-07-31 VITALS
SYSTOLIC BLOOD PRESSURE: 132 MMHG | WEIGHT: 171 LBS | DIASTOLIC BLOOD PRESSURE: 82 MMHG | HEIGHT: 65 IN | OXYGEN SATURATION: 97 % | BODY MASS INDEX: 28.49 KG/M2 | HEART RATE: 81 BPM

## 2019-07-31 DIAGNOSIS — I10 BENIGN ESSENTIAL HYPERTENSION: Primary | ICD-10-CM

## 2019-07-31 ASSESSMENT — MIFFLIN-ST. JEOR: SCORE: 1376.53

## 2019-07-31 NOTE — NURSING NOTE
"54 year old  Chief Complaint   Patient presents with     Hypertension     blood pressure follow up. Pt has been checking at home and some of her number reading higher.       Blood pressure (!) 143/95, pulse 81, height 1.651 m (5' 5\"), weight 77.6 kg (171 lb), SpO2 97 %. Body mass index is 28.46 kg/m .  Patient Active Problem List   Diagnosis     Aftercare     Rosacea     Palpitations     Panic attack     Subclinical hypothyroidism     Poor sleep     Benign essential hypertension     Loose stools       Wt Readings from Last 2 Encounters:   07/31/19 77.6 kg (171 lb)   07/17/19 77.1 kg (170 lb)     BP Readings from Last 3 Encounters:   07/31/19 (!) 143/95   07/17/19 148/84   04/03/19 131/86         Current Outpatient Medications   Medication     acetaminophen (TYLENOL) 500 MG tablet     atenolol (TENORMIN) 25 MG tablet     azelastine (ASTELIN) 0.1 % nasal spray     estrogen conj-synthetic B (ENJUVIA) 0.625 MG tablet     fluticasone (FLONASE) 50 MCG/ACT nasal spray     Current Facility-Administered Medications   Medication     hylan (SYNVISC ONE) injection 48 mg     lidocaine 1 % injection 3 mL     lidocaine 1 % injection 4 mL     triamcinolone acetonide (KENALOG-40) injection 40 mg       Social History     Tobacco Use     Smoking status: Never Smoker     Smokeless tobacco: Never Used   Substance Use Topics     Alcohol use: Yes     Alcohol/week: 0.6 oz     Types: 1 Glasses of wine per week     Comment: socially     Drug use: No       Health Maintenance Due   Topic Date Due     PREVENTIVE CARE VISIT  1965     HEPATITIS C SCREENING  1965     MENTAL HEALTH TX PLAN  1965     COLONOSCOPY  01/10/1975     HIV SCREENING  01/10/1980     LIPID  01/10/2010     ZOSTER IMMUNIZATION (1 of 2) 01/10/2015     MAMMO SCREENING  01/01/2019       No results found for: PAP      July 31, 2019 3:04 PM  "

## 2019-07-31 NOTE — PROGRESS NOTES
Jocelin Hernandez is a 54 year old female here for the following issues:    Hypertension  Jocelin is currently taking 25 mg atenolol daily to treat hypertension.  She notes she has been compliant with her medication, and she has been taking this BID, once in morning and once at night.  She reports no side effects of the medication.  She brought in her blood pressure machine, to check against manual recheck.  She has contacted [employer] about swapping machines if her machine runs high, and will swap for a different machine.  She has one glass of wine per day, 0-1 cups of tea or coffee per day, and is not currently taking any decongestants or sudafed.  She has been taking Tylenol as needed.  She denies blurred vision, headache, chest pain, heart palpitations, shortness of breath or pedal edema.    Her blood pressure machine, today, read 143/95, 147/100, 147/96; manual BP by MD showed 132/78, 132/82 and 138/80  [place]is at home readings had been 143/95, 155/121, 133/77 and 126/84  141/65 with our automated clinic machine     BP Readings from Last 3 Encounters:   07/31/19 132/82   07/17/19 148/84   04/03/19 131/86     Left knee pain  Jocelin recently underwent surgery, left knee medial compartment arthroplasty, 4/11/19. she has been followed by an orthopedic surgeon at [facility] for postoperative pain. She recently got a prescription for Voltaren 50mg bid. She has not yet started the medication. She has some atrophy of her quadriceps muscles and it was recommended she continue with PT.     Patient Active Problem List   Diagnosis     Aftercare     Rosacea     Palpitations     Panic attack     Subclinical hypothyroidism     Poor sleep     Benign essential hypertension     Loose stools       Current Outpatient Medications   Medication Sig Dispense Refill     acetaminophen (TYLENOL) 500 MG tablet Take 1,000 mg by mouth       atenolol (TENORMIN) 25 MG tablet Take 1 tablet (25 mg) by mouth daily 90 tablet 3     azelastine (ASTELIN)  "0.1 % nasal spray Spray 2 sprays into both nostrils 2 times daily 1 Bottle 11     estrogen conj-synthetic B (ENJUVIA) 0.625 MG tablet Take 1 tablet (0.625 mg) by mouth daily IMPLANTED ESTROGEN AND TESTOSTERONE PELLET, LEFT HIP 1 tablet 0     fluticasone (FLONASE) 50 MCG/ACT nasal spray Spray 1 spray into both nostrils daily 16 g 11       Allergies   Allergen Reactions     Hydrocortisone      Molds & Smuts      No Clinical Screening - See Comments Other (See Comments)     Seasonal Allergies         EXAM  /82   Pulse 81   Ht 1.651 m (5' 5\")   Wt 77.6 kg (171 lb)   SpO2 97%   BMI 28.46 kg/m    Not examined    Assessment:  (I10) Benign essential hypertension  (primary encounter diagnosis)  Comment: Discrepant readings today, question if clinic BP machines are calibrated as they read higher than MD manual reading  Plan: Will plan to check BP readings on all machines vs manual readings in the next day and then contact patient with a plan.  For now, BP readings are in target range and would not change dose of atenolol.    ADDENDUM  Follow up comparison readings on both machines are consistent and MD reading correlates with both machines.     Will let pt know that her machine is reliable and ask her to continue measure readings and to send them via [email or web address].  Goal BP is <130/<80 on home blood pressure monitor.         Debbie Grider MD  Internal Medicine/Pediatrics      I, Orion Jara, am serving as a scribe to document services personally performed by Dr. Debbie Grider, based on data collection and the provider's statements to me. Dr. Grider has reviewed, edited, and approved the above note.   "

## 2019-09-05 DIAGNOSIS — I10 BENIGN ESSENTIAL HYPERTENSION: Primary | ICD-10-CM

## 2019-09-05 RX ORDER — ATENOLOL 50 MG/1
50 TABLET ORAL DAILY
Qty: 90 TABLET | Refills: 1 | Status: SHIPPED | OUTPATIENT
Start: 2019-09-05 | End: 2020-01-10

## 2019-12-16 ENCOUNTER — TRANSFERRED RECORDS (OUTPATIENT)
Dept: HEALTH INFORMATION MANAGEMENT | Facility: CLINIC | Age: 54
End: 2019-12-16

## 2020-01-09 NOTE — PROGRESS NOTES
Jocelin Hernandez is a 54 year old female here for the following issues:    Benign essential hypertension   Jocelin Hernandez is a 55 year old female who is here for a blood pressure check. I last saw her in July. At that time I increased Atenolol dose from 25mg to 50mg daily. She reports medication makes her a bit drowsy so she takes it at bedtime. She has a blood pressure machine, but does not use it much. SBP range 130s-140s. She is limited in exercising as she has intermittent low back pain and knee pain.    BP Readings from Last 3 Encounters:   01/10/20 125/74   07/31/19 132/82   07/17/19 148/84        Loose stools  She has hx of loose stools and has been following an elimination diet with marked improvement. Reports getting up once at night to void, this used to be 3-4 times.     TMJ symptoms  She follows with [facility] for treatment. She tends to clench her jaw. She is doing PT.     Patient Active Problem List   Diagnosis     Aftercare     Rosacea     Palpitations     Panic attack     Subclinical hypothyroidism     Poor sleep     Benign essential hypertension     Loose stools       Current Outpatient Medications   Medication Sig Dispense Refill     atenolol (TENORMIN) 50 MG tablet Take 1 tablet (50 mg) by mouth daily 90 tablet 1     azelastine (ASTELIN) 0.1 % nasal spray Spray 2 sprays into both nostrils 2 times daily 1 Bottle 11     diclofenac (VOLTAREN) 1 % topical gel Place onto the skin 4 times daily       estrogen conj-synthetic B (ENJUVIA) 0.625 MG tablet Take 1 tablet (0.625 mg) by mouth daily IMPLANTED ESTROGEN AND TESTOSTERONE PELLET, LEFT HIP 1 tablet 0     fluticasone (FLONASE) 50 MCG/ACT nasal spray Spray 1 spray into both nostrils daily 16 g 11     acetaminophen (TYLENOL) 500 MG tablet Take 1,000 mg by mouth         Allergies   Allergen Reactions     Hydrocortisone      Molds & Smuts      No Clinical Screening - See Comments Other (See Comments)     Seasonal Allergies         EXAM  /74 (BP  "Location: Left arm, Patient Position: Sitting, Cuff Size: Adult Large)   Pulse 97   Temp 97.7  F (36.5  C) (Oral)   Ht 1.64 m (5' 4.57\")   Wt 79 kg (174 lb 4 oz)   SpO2 97%   BMI 29.39 kg/m    Gen: Alert, pleasant, NAD      Assessment:  (I10) Benign essential hypertension  (primary encounter diagnosis)  Comment: blood pressure in target range  Plan: atenolol (TENORMIN) 50 MG tablet        Continue current dose. Discussed lifestyle changes, limiting salt. Weight management, stress management.   RTC 6 months.    Debbie Grider MD  Internal Medicine/Pediatrics        "

## 2020-01-10 ENCOUNTER — OFFICE VISIT (OUTPATIENT)
Dept: FAMILY MEDICINE | Facility: CLINIC | Age: 55
End: 2020-01-10
Payer: COMMERCIAL

## 2020-01-10 VITALS
DIASTOLIC BLOOD PRESSURE: 74 MMHG | BODY MASS INDEX: 29.03 KG/M2 | WEIGHT: 174.25 LBS | TEMPERATURE: 97.7 F | HEIGHT: 65 IN | HEART RATE: 97 BPM | OXYGEN SATURATION: 97 % | SYSTOLIC BLOOD PRESSURE: 125 MMHG

## 2020-01-10 DIAGNOSIS — I10 BENIGN ESSENTIAL HYPERTENSION: Primary | ICD-10-CM

## 2020-01-10 RX ORDER — ATENOLOL 50 MG/1
50 TABLET ORAL DAILY
Qty: 90 TABLET | Refills: 1 | Status: SHIPPED | OUTPATIENT
Start: 2020-01-10 | End: 2021-03-16

## 2020-01-10 ASSESSMENT — MIFFLIN-ST. JEOR: SCORE: 1379.39

## 2020-01-10 NOTE — NURSING NOTE
"55 year old  Chief Complaint   Patient presents with     Hypertension     F/U Blood Pressure        Blood pressure 125/74, pulse 97, temperature 97.7  F (36.5  C), temperature source Oral, height 1.64 m (5' 4.57\"), weight 79 kg (174 lb 4 oz), SpO2 97 %. Body mass index is 29.39 kg/m .  Patient Active Problem List   Diagnosis     Aftercare     Rosacea     Palpitations     Panic attack     Subclinical hypothyroidism     Poor sleep     Benign essential hypertension     Loose stools       Wt Readings from Last 2 Encounters:   01/10/20 79 kg (174 lb 4 oz)   07/31/19 77.6 kg (171 lb)     BP Readings from Last 3 Encounters:   01/10/20 125/74   07/31/19 132/82   07/17/19 148/84         Current Outpatient Medications   Medication     atenolol (TENORMIN) 50 MG tablet     azelastine (ASTELIN) 0.1 % nasal spray     fluticasone (FLONASE) 50 MCG/ACT nasal spray     acetaminophen (TYLENOL) 500 MG tablet     diclofenac (VOLTAREN) 50 MG EC tablet     estrogen conj-synthetic B (ENJUVIA) 0.625 MG tablet     Current Facility-Administered Medications   Medication     hylan (SYNVISC ONE) injection 48 mg     lidocaine 1 % injection 3 mL     lidocaine 1 % injection 4 mL     triamcinolone acetonide (KENALOG-40) injection 40 mg       Social History     Tobacco Use     Smoking status: Never Smoker     Smokeless tobacco: Never Used   Substance Use Topics     Alcohol use: Yes     Alcohol/week: 1.0 standard drinks     Types: 1 Glasses of wine per week     Comment: socially     Drug use: No       Health Maintenance Due   Topic Date Due     PREVENTIVE CARE VISIT  1965     HEPATITIS C SCREENING  1965     ADVANCE CARE PLANNING  1965     MENTAL HEALTH TX PLAN  1965     COLONOSCOPY  01/10/1975     HIV SCREENING  01/10/1980     HPV  01/10/1986     ZOSTER IMMUNIZATION (1 of 2) 01/10/2015     MAMMO SCREENING  01/01/2019     INFLUENZA VACCINE (1) 09/01/2019     PHQ-2  01/01/2020       No results found for: PAP      January 10, 2020 " 11:35 AM

## 2020-02-12 ENCOUNTER — TELEPHONE (OUTPATIENT)
Dept: FAMILY MEDICINE | Facility: CLINIC | Age: 55
End: 2020-02-12

## 2020-02-12 NOTE — TELEPHONE ENCOUNTER
[facility] Call Center    Phone Message    May a detailed message be left on voicemail: yes     Reason for Call: Other: Per Pt is wanting to get a call back in regards to getitng information on which other providers would accept [facility] for insurance.      Action Taken: Message routed to:  [facility]: [facility]     Travel Screening: Not Applicable

## 2020-02-12 NOTE — TELEPHONE ENCOUNTER
Patient reports getting a letter from her insurance that Dr. Grider is no longer in network. Informed her that there may have been a glitch in that system and that Dr. Grider is still provider under [facility]. Contact insurance for questions on coverage. All questions answered and patient appreciated the call back.    Caitlin Brumfield RN  02/12/20  4:22 PM

## 2020-03-11 ENCOUNTER — HEALTH MAINTENANCE LETTER (OUTPATIENT)
Age: 55
End: 2020-03-11

## 2020-05-21 NOTE — PROGRESS NOTES
Family Medicine Video Visit Note  Jocelin Hernandez is being evaluated via a billable video visit.    Consent documented below.  Chief Complaint   Patient presents with     Hypertension      x 1 month had 3 eleveated readings     weight gain     about 15 lbs since increasing atenolol     Weight Gain  Jocelin reports she has had unexplained 15 pound weight gain in the past 6 months. She believes this coincides with an increase in dose of atenolol from 25mg to 50mg, for treatment of hypertension. She has been following with Dr. Wilda Anderson for hormone treatment. Weight gain is concentrated in her abdomen. She is postmenopausal . She had an endometrial ablation 5 yr ago. She has not been able to exercise as much as usual due to knee pain but tries to work out 3x per week. She has been trying to do intermittent fasting.     Wt Readings from Last 2 Encounters:   01/10/20 79 kg (174 lb 4 oz)   07/31/19 77.6 kg (171 lb)     Hypertension  Jocelin takes atenolol and dose was increased in January from 25mg to 50mg. She reports the medication helps with palpitations but she is concerned that the atenolol is the cause of her weight gain. I discussed with Jocelin that atenolol may increase blood sugars. However, historically she has had normal glucose levels. She does not wish to stop entirely, but is interested in lowering her dose.  Nonsmoker, no hx of chest pain. She has a home BP machine and reports readings have been elevated, in the 140-150s systolic. She is concerned about the elevated readings.     BP Readings from Last 3 Encounters:   05/22/20 (!) 159/91   01/10/20 125/74   07/31/19 132/82        Patient Active Problem List   Diagnosis     Aftercare     Rosacea     Palpitations     Panic attack     Subclinical hypothyroidism     Poor sleep     Benign essential hypertension     Loose stools     Past Surgical History:   Procedure Laterality Date     KNEE SURGERY Left 11/2017    meniscal repair       Social History      Tobacco Use     Smoking status: Never Smoker     Smokeless tobacco: Never Used   Substance Use Topics     Alcohol use: Yes     Alcohol/week: 1.0 standard drinks     Types: 1 Glasses of wine per week     Comment: socially     Family History   Problem Relation Age of Onset     Lung Cancer Mother      Hypertension Mother      Cerebrovascular Disease Father 70     Hypertension Father      Diabetes Father      No Known Problems Sister      No Known Problems Son      No Known Problems Daughter      No Known Problems Sister      No Known Problems Daughter            Reviewed and updated as needed this visit by Provider            Review of Systems:     Constitutional, HEENT, cardiovascular, pulmonary, gi and gu systems are negative, except as otherwise noted.         Physical Exam:     BP (!) 159/91 (BP Location: Left arm, Patient Position: Sitting, Cuff Size: Adult Large)    Reading on home machine    GENERAL:alert , pleasant  EYES: Eyes grossly normal to inspection, conjunctivae and sclerae normal  RESP: no audible wheeze, cough, or visible cyanosis.  No visible retractions or increased work of breathing.  Able to speak fully in complete sentences.  NEURO: Cranial nerves grossly intact, mentation intact and speech normal  PSYCH: mentation appears normal, affect normal/bright, judgement and insight intact, normal speech and appearance well-groomed            Assessment and Plan   (I10) Benign essential hypertension  (primary encounter diagnosis)  Comment: current BP readings are high, she is concerned that atenolol is causing weight gain but feels it works well for palpitations  Plan: atenolol (TENORMIN) 25 MG tablet, losartan         (COZAAR) 50 MG tablet        Recommend cutting back dose of atenolol to 25 mg daily. Add losartan 50mg daily, recheck in 2 wks, pt will send BP readings.   Goal <140/90. If not at goal in 2 wk then may increase losartan dose to 100mg daily.     (R63.5) Weight gain  Comment: she reports  "15 pound weight gain since Jan 2020  Plan: continue working with Dr. Anderson, continue exercise and monitor eating habits.       After Visit Information:  Patient chose to view AVS via [employer]      Debbie Grider MD      Video-Visit Details         Video Visit Consent       The patient has been notified of following:     \"This video visit will be conducted via a call between you and your physician/provider. We have found that certain health care needs can be provided without the need for an in-person physical exam.  This service lets us provide the care you need with a video conversation.  If a prescription is necessary we can send it directly to your pharmacy.  If lab work is needed we can place an order for that and you can then stop by our lab to have the test done at a later time.    Video visits are billed at different rates depending on your insurance coverage.  Please reach out to your insurance provider with any questions.    If during the course of the call the physician/provider feels a video visit is not appropriate, you will not be charged for this service.\"    Patient has given verbal consent for Video visit? Yes    How would you like to obtain your AVS? Jordan    Patient would like the video invitation sent by: Send to e-mail at: [email or web address]    Will anyone else be joining your video visit? No      ASK patient if they have taken their temperature and or blood pressure today --if yes enter into vitals under \"patient reported\".             Type of service:  Video Visit  Video Start Time: 2:35pm  THIS is the time provider and patient connect.  Video End Time (time video stopped): 2:52 pm  Total time is 17 minutes    Originating Location (pt. Location): Home    Distant Location (provider location):  [facility]     Mode of Communication:  Video Conference via [employer]                            "

## 2020-05-22 ENCOUNTER — VIRTUAL VISIT (OUTPATIENT)
Dept: FAMILY MEDICINE | Facility: CLINIC | Age: 55
End: 2020-05-22
Payer: COMMERCIAL

## 2020-05-22 VITALS — SYSTOLIC BLOOD PRESSURE: 159 MMHG | DIASTOLIC BLOOD PRESSURE: 91 MMHG

## 2020-05-22 DIAGNOSIS — I10 BENIGN ESSENTIAL HYPERTENSION: Primary | ICD-10-CM

## 2020-05-22 DIAGNOSIS — R63.5 WEIGHT GAIN: ICD-10-CM

## 2020-05-22 RX ORDER — LOSARTAN POTASSIUM 50 MG/1
TABLET ORAL
Qty: 90 TABLET | Refills: 1 | Status: SHIPPED | OUTPATIENT
Start: 2020-05-22 | End: 2020-12-01

## 2020-05-22 RX ORDER — ATENOLOL 25 MG/1
25 TABLET ORAL DAILY
Qty: 90 TABLET | Refills: 1 | Status: SHIPPED | OUTPATIENT
Start: 2020-05-22 | End: 2020-11-05

## 2020-05-22 NOTE — PATIENT INSTRUCTIONS
Change atenolol 25mg dose daily  New one Losartan 50mg dose , take 1/2 tablet daily    Goal   Top <140  Bottom <90    If after a week,  not at goal, then increase the losartan to full tablet    Send my chart message regarding blood pressure readings

## 2020-06-11 ENCOUNTER — NURSE TRIAGE (OUTPATIENT)
Dept: FAMILY MEDICINE | Facility: CLINIC | Age: 55
End: 2020-06-11

## 2020-06-11 ENCOUNTER — MYC MEDICAL ADVICE (OUTPATIENT)
Dept: FAMILY MEDICINE | Facility: CLINIC | Age: 55
End: 2020-06-11

## 2020-06-11 DIAGNOSIS — N30.00 ACUTE CYSTITIS WITHOUT HEMATURIA: Primary | ICD-10-CM

## 2020-06-11 RX ORDER — SULFAMETHOXAZOLE/TRIMETHOPRIM 800-160 MG
1 TABLET ORAL 2 TIMES DAILY
Qty: 6 TABLET | Refills: 0 | Status: SHIPPED | OUTPATIENT
Start: 2020-06-11 | End: 2020-06-14

## 2020-06-11 NOTE — TELEPHONE ENCOUNTER
Patient c/o 2 days of urinary frequency, urgency, pain. Denies fever, abdominal pain, flank pain, blood in urine or dark/pink urine. No change in vaginal dischargeOne UTI in past year and this feels the same. No new medical problems and not currently on abx therapy.    Reason for Disposition    [1] Painful urination AND [2] EITHER frequency or urgency AND [3] has on-call doctor    Additional Information    [1] Discomfort (pain, burning or stinging) when passing urine AND [2] female    Painful urination AND EITHER frequency or urgency    Protocols used: URINATION PAIN - FEMALE-A-AH, URINARY SYMPTOMS-A-AH, URINATION PAIN - FEMALE-A-OH    Verified no new allergies or allergies to any antibiotics. Verified pharmacy. Patient agrees with plan to order Bactrim DS BID x 3 days. All questions answered.    Caitlin Brumfield RN  06/11/20  4:22 PM

## 2020-06-11 NOTE — TELEPHONE ENCOUNTER
Called and LVM - if patient would still like to speak to a nurse, please call back and let the call center know you missed a call from nurse. OK to transfer this call to clinic.     Caitlin Brumfield RN  06/11/20  1:28 PM

## 2020-06-11 NOTE — TELEPHONE ENCOUNTER
Called patient back, just received the voicemail again. Will try again later or tomorrow.     Caitlin Brumfield RN  06/11/20  3:02 PM

## 2020-09-08 ENCOUNTER — VIRTUAL VISIT (OUTPATIENT)
Dept: FAMILY MEDICINE | Facility: OTHER | Age: 55
End: 2020-09-08

## 2020-09-08 NOTE — PROGRESS NOTES
"Date: 2020 15:41:41  Clinician: Andrews Suggs  Clinician NPI: [id number]  Patient: Jocelin Hernandez  Patient : 1965  Patient Address: [address] [place] [address]  Patient Phone: [phone]  Visit Protocol: UTI  Patient Summary:  Jocelin is a 55 year old ( : 1965 ) female who initiated a Visit for a presumed bladder infection. When asked the question \"Please sign me up to receive news, health information and promotions. \", Jocelin responded \"Yes\".   Her symptoms started 1-3 days ago and consist of foul-smelling urine, feeling as if the bladder is never empty, abdominal pain, urinary frequency, urgency, and dysuria.   Symptom details     Urine color: The color of her urine is yellow.     Abdominal pain: The pain is mild (1-3 on a 10 point pain scale).      Denied symptoms include nausea, flank pain, vaginal discharge, chills, urinary incontinence, vomiting, and vaginal itching. She does not feel feverish.   Jocelin has not used any over-the-counter medications or home remedies to relieve her current symptoms.  Precipitating events  Jocelin denies having a sexually transmitted disease.  Pertinent medical history  Jocelin has had a bladder infection before and has had 1 in the past 12 months. Her most recent bladder infection was not within the last 4 weeks. Her current symptoms are similar to her previous bladder infection symptoms.   Ciprofloxacin (Cipro) has been effective in treating her past bladder infections.   Jocelin typically gets a yeast infection when she takes antibiotics. She has used fluconazole (Diflucan) to treat previous yeast infections. 1 dose of fluconazole (Diflucan) has typically been sufficient for symptoms to resolve in the past.   Jocelin has not been prescribed antibiotics to prevent frequent or repeated bladder infections in the past. She has not experienced problems or side effects with any of the common antibiotics used to treat bladder infections.  "  Jocelin does not have a history of kidney stones. She has not used a catheter or been a patient in a hospital or nursing home in the past 2 weeks.   Jocelin does not smoke or use smokeless tobacco.     MEDICATIONS: atenolol oral, losartan oral, NP Thyroid oral, ALLERGIES: NKDA  Clinician Response:  Dear Jocelin,  Based on the information you have provided, you likely have an acute urinary tract infection, also called a bladder infection. Bladder infections occur when bacteria from the outside of the body enters the urinary tract. Any part of the urinary system can be infected, but the bladder is the most common.  Medication information  I am prescribing:     Ciprofloxacin HCl (Cipro) 250 mg oral tablet. Take 1 tablet by mouth every 12 hours for 3 days. There are no refills with this prescription.   Ciprofloxacin HCl (Cipro) is in the fluoroquinolone drug class. Fluoroquinolones have an increased risk of tendon problems. Tendons are the part of the body that connects the muscles to the bones. The risk exists both during treatment and after it is completed. If you notice pain, swelling, weakness, or difficulty moving an arm or leg, stop taking this medication immediately and go to a clinic or urgent care.  Certain antibiotics such as Bactrim and Ciprofloxacin can cause your skin to be more sensitive to the sun. Exposure to the sun when taking these antibiotics may cause skin rash, itching, redness, or a severe sunburn. Be sure to avoid prolonged or direct exposure to the sun, especially between 10 am and 3 pm, if possible. Wear protective clothing and use sunscreen of SPF 30 or higher when you are outdoors.  The medication I prescribed for your bladder infection is an antibiotic. Continue taking the medication until it is gone even if you feel better. If you get an upset stomach while taking antibiotics, taking the medication with food can help.   Yeast infections can be a common side effect of antibiotics. The most  common symptom of a yeast infection is itchiness in and around the vagina. Other signs and symptoms include burning, redness, or a thick, white vaginal discharge that looks like cottage cheese and does not have a bad smell.  Self care  Urination helps to flush bacteria from the urinary tract. For this reason, drinking water and urinating often helps relieve some urinary symptoms and can decrease your risk of getting bladder infections in the future.  Other steps you can take to prevent future bladder infections include:     Wipe front to back after using the bathroom    Urinate after sexual intercourse    Avoid using deodorant sprays, douches, or powders in the vaginal area     When to seek care  Please make an appointment to be seen in a clinic or urgent care if any of the following occur:     You develop new symptoms or your symptoms become worse    You have medication side effects that make it difficult to take them as prescribed    Your symptoms do not improve within 1-2 days of starting treatment    You have symptoms of a bladder infection that return shortly after completing treatment     It is possible to have an allergic reaction to an antibiotic even if you have not had one in the past. If you notice a new rash, significant swelling, or difficulty breathing, stop taking this medication immediately and go to a clinic or urgent care.   Diagnosis: Acute uncomplicated bladder infection  Diagnosis ICD: N39.0  Prescription: ciprofloxacin HCl (Cipro) 250 mg oral tablet 6 tablet, 3 days supply. Take 1 tablet by mouth every 12 hours for 3 days. Refills: 0, Refill as needed: no, Allow substitutions: yes  Pharmacy: [id number] - [phone] - [address] [place] [address]

## 2020-10-23 ENCOUNTER — TRANSFERRED RECORDS (OUTPATIENT)
Dept: HEALTH INFORMATION MANAGEMENT | Facility: CLINIC | Age: 55
End: 2020-10-23

## 2020-11-03 DIAGNOSIS — I10 BENIGN ESSENTIAL HYPERTENSION: ICD-10-CM

## 2020-11-05 RX ORDER — ATENOLOL 25 MG/1
TABLET ORAL
Qty: 30 TABLET | Refills: 0 | Status: SHIPPED | OUTPATIENT
Start: 2020-11-05 | End: 2020-11-13

## 2020-11-05 NOTE — TELEPHONE ENCOUNTER
Last Office Visit: 5/22/20 - virtual visit  Future [facility] Appointments: NONE  Medication last refilled: 5/22/20 #90 + 1 refill    BP Readings from Last 3 Encounters:   05/22/20 (!) 159/91   01/10/20 125/74   07/31/19 132/82     Routing refill request to provider for review/approval because: Do you need an in-clinic visit for her anytime soon?    Caitlin Brumfield RN  11/05/20  9:03 AM

## 2020-11-10 ENCOUNTER — NURSE TRIAGE (OUTPATIENT)
Dept: FAMILY MEDICINE | Facility: CLINIC | Age: 55
End: 2020-11-10

## 2020-11-10 NOTE — TELEPHONE ENCOUNTER
"Called patient due to [employer] message sent in.     Reason for Disposition    [1] Dizziness (vertigo) present now AND [2] one or more stroke risk factors (i.e., hypertension, diabetes, prior stroke/TIA/heart attack)  (Exception: prior physician evaluation for this AND no different/worse than usual)    Answer Assessment - Initial Assessment Questions  DESCRIPTION: \"spinning like I just got off a ride\", \"feels like I'm drunk\"  SEVERITY:  MODERATE - Feels very unsteady when walking, but not falling; interferes with regular activities  ONSET: felt \"odd\" waking up this morning, but then was fine. Went to barre class and after that is when dizziness began and it has worsened while   AGGRAVATING FACTORS: things that clearly make it worse - going from sitting to standing, change in head positions  CAUSE: \"BP is very high - readings today 150/102 and that is unusual\"  RECURRENT SYMPTOM: has never had this before  OTHER SYMPTOMS: denies headache, weakness, numbness, vomiting, earache, head/ear injury, change in medication    Protocols used: DIZZINESS - VERTIGO-A-    Advised pt be seen today, she agrees and plans to go to [facility] in [place].     Caitlin Brumfield RN  11/10/20  2:57 PM      "

## 2020-11-13 ENCOUNTER — OFFICE VISIT (OUTPATIENT)
Dept: FAMILY MEDICINE | Facility: CLINIC | Age: 55
End: 2020-11-13
Payer: COMMERCIAL

## 2020-11-13 VITALS
HEART RATE: 72 BPM | SYSTOLIC BLOOD PRESSURE: 134 MMHG | DIASTOLIC BLOOD PRESSURE: 84 MMHG | WEIGHT: 172 LBS | BODY MASS INDEX: 28.66 KG/M2 | TEMPERATURE: 97.7 F | OXYGEN SATURATION: 99 % | HEIGHT: 65 IN

## 2020-11-13 DIAGNOSIS — H81.10 BENIGN PAROXYSMAL POSITIONAL VERTIGO, UNSPECIFIED LATERALITY: ICD-10-CM

## 2020-11-13 DIAGNOSIS — I10 BENIGN ESSENTIAL HYPERTENSION: Primary | ICD-10-CM

## 2020-11-13 LAB
BUN SERPL-MCNC: 16 MG/DL (ref 7–30)
CALCIUM SERPL-MCNC: 8.6 MG/DL (ref 8.5–10.4)
CHLORIDE SERPLBLD-SCNC: 110 MMOL/L (ref 94–109)
CO2 SERPL-SCNC: 27 MMOL/L (ref 20–32)
CREAT SERPL-MCNC: 0.6 MG/DL (ref 0.6–1.3)
EGFR CALCULATED (BLACK REFERENCE): 133.5
EGFR CALCULATED (NON BLACK REFERENCE): 110.3
GLUCOSE SERPL-MCNC: 100 MG/DL (ref 60–99)
POTASSIUM SERPL-SCNC: 3.9 MMOL/L (ref 3.4–5.3)
SODIUM SERPL-SCNC: 141 MMOL/L (ref 137.3–146.3)

## 2020-11-13 RX ORDER — ATENOLOL 25 MG/1
25 TABLET ORAL DAILY
Qty: 90 TABLET | Refills: 3 | Status: SHIPPED | OUTPATIENT
Start: 2020-11-13 | End: 2021-07-12

## 2020-11-13 ASSESSMENT — MIFFLIN-ST. JEOR: SCORE: 1369.19

## 2020-11-13 NOTE — NURSING NOTE
"55 year old  Chief Complaint   Patient presents with     Hypertension     f/u BP        Blood pressure 134/84, pulse 72, temperature 97.7  F (36.5  C), temperature source Temporal, height 1.64 m (5' 4.57\"), weight 78 kg (172 lb), SpO2 99 %. Body mass index is 29.01 kg/m .  Patient Active Problem List   Diagnosis     Aftercare     Rosacea     Palpitations     Panic attack     Subclinical hypothyroidism     Poor sleep     Benign essential hypertension     Loose stools       Wt Readings from Last 2 Encounters:   11/13/20 78 kg (172 lb)   01/10/20 79 kg (174 lb 4 oz)     BP Readings from Last 3 Encounters:   11/13/20 134/84   05/22/20 (!) 159/91   01/10/20 125/74         Current Outpatient Medications   Medication     acetaminophen (TYLENOL) 500 MG tablet     atenolol (TENORMIN) 25 MG tablet     estrogen conj-synthetic B (ENJUVIA) 0.625 MG tablet     fluticasone (FLONASE) 50 MCG/ACT nasal spray     losartan (COZAAR) 50 MG tablet     NP THYROID PO     progesterone (PROMETRIUM) 100 MG capsule     atenolol (TENORMIN) 50 MG tablet     azelastine (ASTELIN) 0.1 % nasal spray     Current Facility-Administered Medications   Medication     hylan (SYNVISC ONE) injection 48 mg     lidocaine 1 % injection 3 mL     lidocaine 1 % injection 4 mL     triamcinolone acetonide (KENALOG-40) injection 40 mg       Social History     Tobacco Use     Smoking status: Never Smoker     Smokeless tobacco: Never Used   Substance Use Topics     Alcohol use: Yes     Alcohol/week: 1.0 standard drinks     Types: 1 Glasses of wine per week     Comment: socially     Drug use: No       Health Maintenance Due   Topic Date Due     PREVENTIVE CARE VISIT  1965     ADVANCE CARE PLANNING  1965     MENTAL HEALTH TX PLAN  1965     COLORECTAL CANCER SCREENING  01/10/1975     HIV SCREENING  01/10/1980     HEPATITIS C SCREENING  01/10/1983     LIPID  01/10/2010     ZOSTER IMMUNIZATION (1 of 2) 01/10/2015     MAMMO SCREENING  01/01/2019       No " results found for: PAP      November 13, 2020 2:54 PM

## 2020-11-13 NOTE — PROGRESS NOTES
"Jocelin Hernandez is a 55 year old female here for the following issues:    HTN  Jocelin has history of hypertension. Her last visit in May was a virtual visit. At that time she reported elevated BP readings on her home BP machine.   She had also gained weight and was concerned this was secondary to atenolol use. She was taking 50mg daily and reported it helped with palpitations.   Our plan was to reduce dose of atenolol to 25mg daily and add losartan 50mg daily. I recommended a goal of <140/90. She took losartan 25mg dose only and blood pressures were running \"optimal\" 125/80.  New rx and took the full 50mg 2-3 days. Went down to 25 mg dose again last night.    BP Readings from Last 3 Encounters:   11/13/20 134/84   05/22/20 (!) 159/91   01/10/20 125/74       Vertigo  Jocelin reports a history of benign positional vertigo which occurred about 25 years ago.  In the past week, she developed symptoms of spinning when she lied down on her back.  There was no vomiting.  She has been able to hydrate.  Symptoms were fairly debilitating. She went to an urgent care for an evaluation.  She was concerned she might be having a stroke but then was reassured.  No vision changes or headaches.  No weakness.  She is now wearing a scopolamine patch and that is working well.  She took it off last night and symptoms abruptly worsened.      Patient Active Problem List   Diagnosis     Aftercare     Rosacea     Palpitations     Panic attack     Subclinical hypothyroidism     Poor sleep     Benign essential hypertension     Loose stools       Current Outpatient Medications   Medication Sig Dispense Refill     acetaminophen (TYLENOL) 500 MG tablet Take 1,000 mg by mouth       atenolol (TENORMIN) 25 MG tablet TAKE 1 TABLET BY MOUTH DAILY 30 tablet 0     atenolol (TENORMIN) 50 MG tablet Take 1 tablet (50 mg) by mouth daily 90 tablet 1     azelastine (ASTELIN) 0.1 % nasal spray Spray 2 sprays into both nostrils 2 times daily 1 Bottle 11     " "estrogen conj-synthetic B (ENJUVIA) 0.625 MG tablet Take 1 tablet (0.625 mg) by mouth daily IMPLANTED ESTROGEN AND TESTOSTERONE PELLET, LEFT HIP 1 tablet 0     fluticasone (FLONASE) 50 MCG/ACT nasal spray Spray 1 spray into both nostrils daily 16 g 11     losartan (COZAAR) 50 MG tablet Take one daily 90 tablet 1     NP THYROID PO        progesterone (PROMETRIUM) 100 MG capsule Take 1 capsule (100 mg) by mouth daily 90 capsule 3       Allergies   Allergen Reactions     Hydrocortisone      Molds & Smuts      No Clinical Screening - See Comments Other (See Comments)     Seasonal Allergies         EXAM  /84 (BP Location: Right arm, Patient Position: Sitting, Cuff Size: Adult Large)   Pulse 72   Temp 97.7  F (36.5  C) (Temporal)   Ht 1.64 m (5' 4.57\")   Wt 78 kg (172 lb)   SpO2 99%   BMI 29.01 kg/m    Gen: Alert, pleasant, NAD  COR: S1,S2, no murmur  Lungs: CTA bilaterally, no rhonchi, wheezes or rales  Ext: no peripheral edema, pulses full  Neuro: CN 2-12 grossly intact  DTR +2/4 in all extremities  normal tandem gait    Assessment:.  (I10) Benign essential hypertension  (primary encounter diagnosis)  Comment: blood pressure in target range  Plan: Basic Metabolic Panel ([place]), atenolol         (TENORMIN) 25 MG tablet        Refilled medication     (H81.10) Benign paroxysmal positional vertigo, unspecified laterality  Comment: worse with lying supine  Plan: discussed modified canalith repositioning maneuver. Continue scopolamine patch. Do exercises 2-3 times a day. Offered referral to vestibular rehab.she will consider this if not improving.     Debbie Grdier MD  Internal Medicine/Pediatrics    "

## 2020-11-29 DIAGNOSIS — I10 BENIGN ESSENTIAL HYPERTENSION: ICD-10-CM

## 2020-12-01 RX ORDER — LOSARTAN POTASSIUM 50 MG/1
TABLET ORAL
Qty: 90 TABLET | Refills: 1 | Status: SHIPPED | OUTPATIENT
Start: 2020-12-01 | End: 2021-06-28

## 2020-12-01 NOTE — TELEPHONE ENCOUNTER
Last office visit was on 11/13/2020, no future visits scheduled.  Medication last ordered 5/22/20 #90 + 1 refill    Prescription approved per [facility] Refill Protocol.  Caitlin Brumfield RN  12/01/20  4:56 PM

## 2020-12-02 ENCOUNTER — TRANSFERRED RECORDS (OUTPATIENT)
Dept: HEALTH INFORMATION MANAGEMENT | Facility: CLINIC | Age: 55
End: 2020-12-02

## 2020-12-11 ENCOUNTER — TRANSFERRED RECORDS (OUTPATIENT)
Dept: HEALTH INFORMATION MANAGEMENT | Facility: CLINIC | Age: 55
End: 2020-12-11

## 2020-12-14 DIAGNOSIS — I10 BENIGN ESSENTIAL HYPERTENSION: ICD-10-CM

## 2020-12-14 RX ORDER — ATENOLOL 25 MG/1
25 TABLET ORAL DAILY
Qty: 90 TABLET | Refills: 3 | Status: CANCELLED | OUTPATIENT
Start: 2020-12-14

## 2020-12-14 NOTE — TELEPHONE ENCOUNTER
Last time prescribed: 11/13/2020 , 90 tabs x 3 refills  Last office visit: 11/13/2020  Next appointment: No future appointments    Too soon for refill - just refilled for a year. Pharmacy called and notified this is too soon.    Caitlin Brumfield RN  12/16/20  9:13 AM

## 2020-12-16 ENCOUNTER — TRANSFERRED RECORDS (OUTPATIENT)
Dept: HEALTH INFORMATION MANAGEMENT | Facility: CLINIC | Age: 55
End: 2020-12-16

## 2021-01-05 ENCOUNTER — TRANSFERRED RECORDS (OUTPATIENT)
Dept: HEALTH INFORMATION MANAGEMENT | Facility: CLINIC | Age: 56
End: 2021-01-05

## 2021-01-25 ENCOUNTER — TRANSFERRED RECORDS (OUTPATIENT)
Dept: HEALTH INFORMATION MANAGEMENT | Facility: CLINIC | Age: 56
End: 2021-01-25

## 2021-01-26 DIAGNOSIS — L40.50 PSORIATIC ARTHRITIS (H): Primary | ICD-10-CM

## 2021-02-09 ENCOUNTER — TRANSFERRED RECORDS (OUTPATIENT)
Dept: HEALTH INFORMATION MANAGEMENT | Facility: CLINIC | Age: 56
End: 2021-02-09

## 2021-02-23 ENCOUNTER — TRANSFERRED RECORDS (OUTPATIENT)
Dept: HEALTH INFORMATION MANAGEMENT | Facility: CLINIC | Age: 56
End: 2021-02-23

## 2021-02-24 ENCOUNTER — DOCUMENTATION ONLY (OUTPATIENT)
Dept: CARE COORDINATION | Facility: CLINIC | Age: 56
End: 2021-02-24

## 2021-03-15 NOTE — PROGRESS NOTES
Rheumatology Clinic Visit 1-remote     Jocelin Hernandez MRN# [id number]   YOB: 1965 Age: 56 year old     Date of Visit: 03/15/2021  Primary care provider: Debbie Grider          Assessment and Plan:     #Psoriatic arthritis (diffuse polyarthralgia, sicca symptoms)   Patient has chronic diffuse polyarthralgia involving her fingers, feet, knees, hips and lower back which has correlated with the severity of her psoriasis. She also relates having swelling and morning stiffness. Physical examination revealed swelling and probable synovitis in PIPs of 1st and 2nd digit of her L hand and 5th finger of her R hand.    Laboratory evaluation on November 13, 2020 showed normal electrolytes, creatinine 0.6, and glucose 100. CBC, last done in April 3, 2019, was normal except for MCV of 100. Knee x-rays done in September 2018 showed mild narrowing in both medial compartments.    History and physical examination strongly suggest a systemic inflammatory component. Given the correlation between psoriasis severity and polyarthralgia, psoriatic arthritis is most likely the cause. Given her sicca symptoms (dry eyes), bilateral small joint invovlement, other possibilities include systemic lupus erythematosus (SLE), Rheumatoid Arthritis and osteoarthritis. Serological evaluations should be performed to rule out seropositive inflammatory arthritis. Given the sub-therapeutic dose of Otezla currently being taken, I believe the Otezla is providing minimal anti-inflammatory benefit. I recommend starting Humira 40 mg subcutaneously every 2 weeks as it can provide relief for both the ongoing psoriasis and arthritis. If joint symptoms worsen, a bridge burst therapy of prednisone may be warranted until maximal efficacy of Humira is achieved.     Plan:  1.  Check rheumatoid factor, cyclic citrullinated peptide antibodies antinuclear antibody, inflammatory markers, and TB testing.  2.  Start Humira 40 mg subcutaneous every 14  "days.  3.  If you are scheduled for the coronavirus vaccine, avoid starting Humira for 10 days after the first dose; if you can arrange the schedule easily, also avoid dosing Humira for 10 days after the second dose of the coronavirus vaccine.  4.  If inflammatory joint symptoms escalate prior to getting on Humira, a brief course (2 weeks) of low-dose prednisone may help to \"bridge\" to the effectiveness of Humira.  Let our office know if you would like to access this prescription.    I was present with the medical student who participated in the service and in the documentation of the note. I have verified the history and personally performed the physical exam and medical decision making. I agree with the assessment and plan of care as documented in the note.    Israel Polk M.D.  Staff Rheumatologist,  Health  Pager [phone]          Active Problem List:     Patient Active Problem List    Diagnosis Date Noted     Benign essential hypertension 07/17/2019     Priority: Medium     Loose stools 07/17/2019     Priority: Medium     Subclinical hypothyroidism 08/10/2018     Priority: Medium     Poor sleep 08/10/2018     Priority: Medium     Palpitations 07/03/2018     Priority: Medium     Panic attack 07/03/2018     Priority: Medium     Rosacea 05/22/2018     Priority: Medium     Overview:   Created by Conversion       Aftercare 03/20/2018     Priority: Medium            History of Present Illness:   Jocelin Hernandez presents for evaluation of joint pain, referred by Dr. Araujo in dermatology.    Patient saw Dr. Araujo in in dermatology last on February 23, 2021.  At that time, psoriasis was judged to be under improved control with use of Otezla monotherapy, in combination with clobetasol topical.  However patient related joint pain in several fingers, and patient was referred for consideration of psoriatic arthritis.    Patient has had psoriasis the past few years, first originating in her scalp and inner ears. " The psoriasis has been managed topically and under good control. Within the past few months, the psoriasis flared involving new rashes on the side of her back trunks and worsening of patches in her scalp. She was started on Otezla, which has provided better control of the psoriasis, however she has only been able to take half the maximal dose due to side gastrointestinal side effects.     Daniela notes having joint pain the past few years at basal levels. Back then, she attributed the arthralgia to knee replacement surgery but notices that the joint pain has been worse ever since her psoriasis has flared. Affected joints include her fingers, feet, kenes, hips and lower back. She has been having difficulty sleeping or awakening during the middle of the night due to the pain. She notes some visible swelling in her PIPs, involving her 1st and 2nd digit on her L hand and the 5th digit on her R hand. Swelling in ankles and the L knee are present. Stiffness in the joints is worse in the morning and typically lasts 1 hour. She has tried using advil, tylenol, pilates and some physical therapy but has provided minimal relief. She recently took some gabapentin for pain and has helped her with sleep.     Along with the joint pain, other notable symptoms include chronic dry eyes, which she is using Xiidra. She has been having bladder retention issues and is being seen by urology.          Review of Systems:       Constitutional: negative  Skin: psoriatic plaques affecting her scalp and lower back trunk  Eyes: dry eyes; no change in vision  Ears/Nose/Throat: negative  Respiratory: No shortness of breath, dyspnea on exertion, cough, or hemoptysis  Cardiovascular: negative  Gastrointestinal: negative  Genitourinary: bladder retention; being followed by urology;  Musculoskeletal: polyarthralgia as mentioned above  Neurologic: some numbness/tingling below the knee (likely from the knee replacement surgery)  Psychiatric:  "negative  Hematologic/Lymphatic/Immunologic: negative  Endocrine: negative          Past Medical History:     Past Medical History:   Diagnosis Date     Hypertension      Hypothyroid      Iliotibial band syndrome affecting left lower leg 11/23/2018     Palpitations      Pneumonia      Shortness of breath      Past Surgical History:   Procedure Laterality Date     KNEE SURGERY Left 11/2017    meniscal repair            Social History:     Social History     Occupational History     Not on file   Tobacco Use     Smoking status: Never Smoker     Smokeless tobacco: Never Used   Substance and Sexual Activity     Alcohol use: Yes     Alcohol/week: 1.0 standard drinks     Types: 1 Glasses of wine per week     Comment: socially     Drug use: No     Sexual activity: Not on file            Family History:     Family History   Problem Relation Age of Onset     Lung Cancer Mother      Hypertension Mother      Cerebrovascular Disease Father 70     Hypertension Father      Diabetes Father      No Known Problems Sister      No Known Problems Son      No Known Problems Daughter      No Known Problems Sister      No Known Problems Daughter    Sister - Psoriasis  Father & Maternal grandmother - arthritis; unknown whether they had inflammatory or osteoarthritis, but patient notes they had \"twisted fingers\"         Allergies:     Allergies   Allergen Reactions     Hydrocortisone      Molds & Smuts      No Clinical Screening - See Comments Other (See Comments)     Seasonal Allergies             Medications:     Current Outpatient Medications   Medication Sig Dispense Refill     acetaminophen (TYLENOL) 500 MG tablet Take 1,000 mg by mouth       atenolol (TENORMIN) 25 MG tablet Take 1 tablet (25 mg) by mouth daily 90 tablet 3     atenolol (TENORMIN) 50 MG tablet Take 1 tablet (50 mg) by mouth daily 90 tablet 1     azelastine (ASTELIN) 0.1 % nasal spray Spray 2 sprays into both nostrils 2 times daily 1 Bottle 11     estrogen conj-synthetic " "B (ENJUVIA) 0.625 MG tablet Take 1 tablet (0.625 mg) by mouth daily IMPLANTED ESTROGEN AND TESTOSTERONE PELLET, LEFT HIP 1 tablet 0     fluticasone (FLONASE) 50 MCG/ACT nasal spray Spray 1 spray into both nostrils daily 16 g 11     losartan (COZAAR) 50 MG tablet Take one daily 90 tablet 1     NP THYROID PO        progesterone (PROMETRIUM) 100 MG capsule Take 1 capsule (100 mg) by mouth daily 90 capsule 3            Physical Exam:   There were no vitals taken for this visit.  Wt Readings from Last 4 Encounters:   11/13/20 78 kg (172 lb)   01/10/20 79 kg (174 lb 4 oz)   07/31/19 77.6 kg (171 lb)   07/17/19 77.1 kg (170 lb)       Constitutional: well-developed, appearing stated age; cooperative  Eyes: nl EOM, PERRLA, vision, conjunctiva, sclera  ENT: nl external ears, nose, hearing, lips, teeth  Neck: no mass or thyroid enlargement  Resp: unlabored breathing to room air  CV: not assessed  GI: not assessed  : not tested  Lymph: no visible cervical, supraclavicular, or epitrochlear nodes  MS: Full ROM present in her wrists and shoulders. Synovitis in PIPs of 1st, 2nd, and 3rd digit on her L hand and 5th finger on her right hand. Good fist formation yet patient feels \"fluids moving\" with fist formation.   Skin: no psoriasis visible on video exam  Neuro: nl cranial nerves  Psych: nl judgement, orientation, memory, affect.         Data:     No results found for any visits on 03/16/21.    RHEUM RESULTS Latest Ref Rng & Units 9/19/2018 4/3/2019 11/13/2020   HGB 11.7 - 15.7 g/dL - 14.4 -   HCT 35.0 - 47.0 % - 42.3 -   MCV 78.0 - 100.0 fL - 100.5(H) -   MCHC 32.0 - 36.0 g/dL - 34.0 -   RDW % - 11.9 -   CREATININE 0.6 - 1.3 mg/dL 0.71 0.67 0.6   GFR ESTIMATE, IF BLACK >60 mL/min/[1.73:m2] >90 >90 -   GFR ESTIMATE >60 mL/min/[1.73:m2] 85 >90 -       Per patient. She has pain in feet in AM upon waking.  She wakes up frequently during the night with lower back and hip pain, it is hard to get comfortable. Her fingers on both hands " "are swollen/painful.  Her skin issues have been managed with topicals for the most part.  She has seen a podiatrist and a \"spine specialist\" to treat her various symptoms thus far.      Jocelin is a 56 year old who is being evaluated via a billable video visit.      How would you like to obtain your AVS? MyChart    Will anyone else be joining your video visit? No      Video Start Time: 0800 am  Video-Visit Details    Type of service:  Video Visit    Video End Time: 0905 am    Originating Location (pt. Location): Home    Distant Location (provider location):  [facility] RHEUMATOLOGY [facility]IN[facility] [place]     Platform used for Video Visit: Vance    "

## 2021-03-16 ENCOUNTER — VIRTUAL VISIT (OUTPATIENT)
Dept: RHEUMATOLOGY | Facility: CLINIC | Age: 56
End: 2021-03-16
Attending: INTERNAL MEDICINE
Payer: COMMERCIAL

## 2021-03-16 DIAGNOSIS — L40.50 PSORIATIC ARTHRITIS (H): ICD-10-CM

## 2021-03-16 PROCEDURE — 99205 OFFICE O/P NEW HI 60 MIN: CPT | Mod: GT | Performed by: INTERNAL MEDICINE

## 2021-03-16 RX ORDER — LIRAGLUTIDE 6 MG/ML
INJECTION SUBCUTANEOUS
COMMUNITY
End: 2021-05-04

## 2021-03-16 RX ORDER — APREMILAST 30 MG/1
TABLET, FILM COATED ORAL
COMMUNITY
Start: 2021-02-18 | End: 2021-05-04

## 2021-03-16 NOTE — LETTER
3/16/2021       RE: Jocelin Hernandez  [address] [address]     Dear Colleague,    Thank you for referring your patient, Jocelin Hernandez, to the [facility] RHEUMATOLOGY CLINIC [place] [facility]. Please see a copy of my visit note below.    Rheumatology Clinic Visit 1-remote     Jocelin Hernandez MRN# [id number]   YOB: 1965 Age: 56 year old     Date of Visit: 03/15/2021  Primary care provider: Debbie Grider          Assessment and Plan:     #Psoriatic arthritis (diffuse polyarthralgia, sicca symptoms)   Patient has chronic diffuse polyarthralgia involving her fingers, feet, knees, hips and lower back which has correlated with the severity of her psoriasis. She also relates having swelling and morning stiffness. Physical examination revealed swelling and probable synovitis in PIPs of 1st and 2nd digit of her L hand and 5th finger of her R hand.    Laboratory evaluation on November 13, 2020 showed normal electrolytes, creatinine 0.6, and glucose 100. CBC, last done in April 3, 2019, was normal except for MCV of 100. Knee x-rays done in September 2018 showed mild narrowing in both medial compartments.    History and physical examination strongly suggest a systemic inflammatory component. Given the correlation between psoriasis severity and polyarthralgia, psoriatic arthritis is most likely the cause. Given her sicca symptoms (dry eyes), bilateral small joint invovlement, other possibilities include systemic lupus erythematosus (SLE), Rheumatoid Arthritis and osteoarthritis. Serological evaluations should be performed to rule out seropositive inflammatory arthritis. Given the sub-therapeutic dose of Otezla currently being taken, I believe the Otezla is providing minimal anti-inflammatory benefit. I recommend starting Humira 40 mg subcutaneously every 2 weeks as it can provide relief for both the ongoing  "psoriasis and arthritis. If joint symptoms worsen, a bridge burst therapy of prednisone may be warranted until maximal efficacy of Humira is achieved.     Plan:  1.  Check rheumatoid factor, cyclic citrullinated peptide antibodies antinuclear antibody, inflammatory markers, and TB testing.  2.  Start Humira 40 mg subcutaneous every 14 days.  3.  If you are scheduled for the coronavirus vaccine, avoid starting Humira for 10 days after the first dose; if you can arrange the schedule easily, also avoid dosing Humira for 10 days after the second dose of the coronavirus vaccine.  4.  If inflammatory joint symptoms escalate prior to getting on Humira, a brief course (2 weeks) of low-dose prednisone may help to \"bridge\" to the effectiveness of Humira.  Let our office know if you would like to access this prescription.    I was present with the medical student who participated in the service and in the documentation of the note. I have verified the history and personally performed the physical exam and medical decision making. I agree with the assessment and plan of care as documented in the note.    Israel Polk M.D.  Staff Rheumatologist, [facility]  Pager [phone]          Active Problem List:     Patient Active Problem List    Diagnosis Date Noted     Benign essential hypertension 07/17/2019     Priority: Medium     Loose stools 07/17/2019     Priority: Medium     Subclinical hypothyroidism 08/10/2018     Priority: Medium     Poor sleep 08/10/2018     Priority: Medium     Palpitations 07/03/2018     Priority: Medium     Panic attack 07/03/2018     Priority: Medium     Rosacea 05/22/2018     Priority: Medium     Overview:   Created by Conversion       Aftercare 03/20/2018     Priority: Medium            History of Present Illness:   Jocelin Hernandez presents for evaluation of joint pain, referred by Dr. Araujo in dermatology.    Patient saw Dr. Araujo in in dermatology last on February 23, 2021.  At that time, " psoriasis was judged to be under improved control with use of Otezla monotherapy, in combination with clobetasol topical.  However patient related joint pain in several fingers, and patient was referred for consideration of psoriatic arthritis.    Patient has had psoriasis the past few years, first originating in her scalp and inner ears. The psoriasis has been managed topically and under good control. Within the past few months, the psoriasis flared involving new rashes on the side of her back trunks and worsening of patches in her scalp. She was started on Otezla, which has provided better control of the psoriasis, however she has only been able to take half the maximal dose due to side gastrointestinal side effects.     Patidanielle notes having joint pain the past few years at basal levels. Back then, she attributed the arthralgia to knee replacement surgery but notices that the joint pain has been worse ever since her psoriasis has flared. Affected joints include her fingers, feet, kenes, hips and lower back. She has been having difficulty sleeping or awakening during the middle of the night due to the pain. She notes some visible swelling in her PIPs, involving her 1st and 2nd digit on her L hand and the 5th digit on her R hand. Swelling in ankles and the L knee are present. Stiffness in the joints is worse in the morning and typically lasts 1 hour. She has tried using advil, tylenol, pilates and some physical therapy but has provided minimal relief. She recently took some gabapentin for pain and has helped her with sleep.     Along with the joint pain, other notable symptoms include chronic dry eyes, which she is using Xiidra. She has been having bladder retention issues and is being seen by urology.          Review of Systems:       Constitutional: negative  Skin: psoriatic plaques affecting her scalp and lower back trunk  Eyes: dry eyes; no change in vision  Ears/Nose/Throat: negative  Respiratory: No shortness  "of breath, dyspnea on exertion, cough, or hemoptysis  Cardiovascular: negative  Gastrointestinal: negative  Genitourinary: bladder retention; being followed by urology;  Musculoskeletal: polyarthralgia as mentioned above  Neurologic: some numbness/tingling below the knee (likely from the knee replacement surgery)  Psychiatric: negative  Hematologic/Lymphatic/Immunologic: negative  Endocrine: negative          Past Medical History:     Past Medical History:   Diagnosis Date     Hypertension      Hypothyroid      Iliotibial band syndrome affecting left lower leg 11/23/2018     Palpitations      Pneumonia      Shortness of breath      Past Surgical History:   Procedure Laterality Date     KNEE SURGERY Left 11/2017    meniscal repair            Social History:     Social History     Occupational History     Not on file   Tobacco Use     Smoking status: Never Smoker     Smokeless tobacco: Never Used   Substance and Sexual Activity     Alcohol use: Yes     Alcohol/week: 1.0 standard drinks     Types: 1 Glasses of wine per week     Comment: socially     Drug use: No     Sexual activity: Not on file            Family History:     Family History   Problem Relation Age of Onset     Lung Cancer Mother      Hypertension Mother      Cerebrovascular Disease Father 70     Hypertension Father      Diabetes Father      No Known Problems Sister      No Known Problems Son      No Known Problems Daughter      No Known Problems Sister      No Known Problems Daughter    Sister - Psoriasis  Father & Maternal grandmother - arthritis; unknown whether they had inflammatory or osteoarthritis, but patient notes they had \"twisted fingers\"         Allergies:     Allergies   Allergen Reactions     Hydrocortisone      Molds & Smuts      No Clinical Screening - See Comments Other (See Comments)     Seasonal Allergies             Medications:     Current Outpatient Medications   Medication Sig Dispense Refill     acetaminophen (TYLENOL) 500 MG " "tablet Take 1,000 mg by mouth       atenolol (TENORMIN) 25 MG tablet Take 1 tablet (25 mg) by mouth daily 90 tablet 3     atenolol (TENORMIN) 50 MG tablet Take 1 tablet (50 mg) by mouth daily 90 tablet 1     azelastine (ASTELIN) 0.1 % nasal spray Spray 2 sprays into both nostrils 2 times daily 1 Bottle 11     estrogen conj-synthetic B (ENJUVIA) 0.625 MG tablet Take 1 tablet (0.625 mg) by mouth daily IMPLANTED ESTROGEN AND TESTOSTERONE PELLET, LEFT HIP 1 tablet 0     fluticasone (FLONASE) 50 MCG/ACT nasal spray Spray 1 spray into both nostrils daily 16 g 11     losartan (COZAAR) 50 MG tablet Take one daily 90 tablet 1     NP THYROID PO        progesterone (PROMETRIUM) 100 MG capsule Take 1 capsule (100 mg) by mouth daily 90 capsule 3            Physical Exam:   There were no vitals taken for this visit.  Wt Readings from Last 4 Encounters:   11/13/20 78 kg (172 lb)   01/10/20 79 kg (174 lb 4 oz)   07/31/19 77.6 kg (171 lb)   07/17/19 77.1 kg (170 lb)       Constitutional: well-developed, appearing stated age; cooperative  Eyes: nl EOM, PERRLA, vision, conjunctiva, sclera  ENT: nl external ears, nose, hearing, lips, teeth  Neck: no mass or thyroid enlargement  Resp: unlabored breathing to room air  CV: not assessed  GI: not assessed  : not tested  Lymph: no visible cervical, supraclavicular, or epitrochlear nodes  MS: Full ROM present in her wrists and shoulders. Synovitis in PIPs of 1st, 2nd, and 3rd digit on her L hand and 5th finger on her right hand. Good fist formation yet patient feels \"fluids moving\" with fist formation.   Skin: no psoriasis visible on video exam  Neuro: nl cranial nerves  Psych: nl judgement, orientation, memory, affect.         Data:     No results found for any visits on 03/16/21.    RHEUM RESULTS Latest Ref Rng & Units 9/19/2018 4/3/2019 11/13/2020   HGB 11.7 - 15.7 g/dL - 14.4 -   HCT 35.0 - 47.0 % - 42.3 -   MCV 78.0 - 100.0 fL - 100.5(H) -   MCHC 32.0 - 36.0 g/dL - 34.0 -   RDW % - " "11.9 -   CREATININE 0.6 - 1.3 mg/dL 0.71 0.67 0.6   GFR ESTIMATE, IF BLACK >60 mL/min/[1.73:m2] >90 >90 -   GFR ESTIMATE >60 mL/min/[1.73:m2] 85 >90 -       Per patient. She has pain in feet in AM upon waking.  She wakes up frequently during the night with lower back and hip pain, it is hard to get comfortable. Her fingers on both hands are swollen/painful.  Her skin issues have been managed with topicals for the most part.  She has seen a podiatrist and a \"spine specialist\" to treat her various symptoms thus far.      Jocelin is a 56 year old who is being evaluated via a billable video visit.      How would you like to obtain your AVS? MyChart    Will anyone else be joining your video visit? No      Video Start Time: 0800 am  Video-Visit Details    Type of service:  Video Visit    Video End Time: 0905 am    Originating Location (pt. Location): Home    Distant Location (provider location):  [facility] RHEUMATOLOGY CLINIC [place]     Platform used for Video Visit: Vance"

## 2021-03-16 NOTE — PATIENT INSTRUCTIONS
"Diagnosis:  1.  Psoriatic arthritis with psoriasis on the scalp and flanks and inflammatory joint pain arising in fingers hips and knees.  I do not think that Otezla has been adequate to combat inflammatory skin and joint disease.  I recommend discontinuing Otezla and starting adalimumab (Humira).    Plan:  1.  Check rheumatoid factor, cyclic citrullinated peptide antibodies antinuclear antibody, inflammatory markers, and TB testing.  2.  Start Humira 40 mg subcutaneous every 14 days; discontinue Otezla.  3.  If you are scheduled for the coronavirus vaccine, avoid starting Humira for 10 days after the first dose; if you can arrange the schedule easily, also avoid dosing Humira for 10 days after the second dose of the coronavirus vaccine.  4.  If inflammatory joint symptoms escalate prior to getting on Humira, a brief course (2 weeks) of low-dose prednisone may help to \"bridge\" to the effectiveness of Humira.  Let our office know if you would like to access this prescription.  "

## 2021-03-17 ENCOUNTER — TELEPHONE (OUTPATIENT)
Dept: RHEUMATOLOGY | Facility: CLINIC | Age: 56
End: 2021-03-17

## 2021-03-17 NOTE — TELEPHONE ENCOUNTER
PA Initiation    Medication: HUMIRA   Insurance Company: [employer] ([facility]) - Phone [phone] Fax [phone]  Pharmacy Filling the Rx: [facility] - [place], [place] - [address] [facility] [address]  Filling Pharmacy Phone:    Filling Pharmacy Fax:    Start Date: 3/17/2021    GT ARANA (Ramos: [id number])

## 2021-03-18 NOTE — TELEPHONE ENCOUNTER
PRIOR AUTHORIZATION DENIED    Medication: HUMIRA - DENIED     Denial Date: 3/17/2021    Denial Rational:  PATIENT HAS NOT TRIED AND FAILED 3 MONTHS OF MAXIMUM TOLERATED DOSE OF METHOTREXATE    Appeal Information:  CAN APPEAL

## 2021-03-19 NOTE — TELEPHONE ENCOUNTER
Patient has inflammatory low back pain, suspected of being due to sacroiliitis.  Sacroiliitis is not effectively treated with methotrexate.  Having failed Otezla due to lack of tolerability and lack of efficacy, should receive anti-TNF treatment as the next agent.

## 2021-03-22 DIAGNOSIS — L40.50 PSORIATIC ARTHRITIS (H): ICD-10-CM

## 2021-03-22 LAB
CRP SERPL-MCNC: <2.9 MG/L (ref 0–8)
ERYTHROCYTE [SEDIMENTATION RATE] IN BLOOD BY WESTERGREN METHOD: 5 MM/H (ref 0–30)

## 2021-03-22 PROCEDURE — 86140 C-REACTIVE PROTEIN: CPT | Performed by: INTERNAL MEDICINE

## 2021-03-22 PROCEDURE — 86481 TB AG RESPONSE T-CELL SUSP: CPT | Performed by: INTERNAL MEDICINE

## 2021-03-22 PROCEDURE — 85652 RBC SED RATE AUTOMATED: CPT | Performed by: INTERNAL MEDICINE

## 2021-03-22 PROCEDURE — 86038 ANTINUCLEAR ANTIBODIES: CPT | Performed by: INTERNAL MEDICINE

## 2021-03-22 PROCEDURE — 86431 RHEUMATOID FACTOR QUANT: CPT | Performed by: INTERNAL MEDICINE

## 2021-03-22 PROCEDURE — 86200 CCP ANTIBODY: CPT | Performed by: INTERNAL MEDICINE

## 2021-03-22 PROCEDURE — 36415 COLL VENOUS BLD VENIPUNCTURE: CPT | Performed by: INTERNAL MEDICINE

## 2021-03-22 NOTE — TELEPHONE ENCOUNTER
Medication Appeal Initiation    We have initiated an appeal for the requested medication:  Medication: HUMIRA - Appeal initiated   Appeal Start Date:  3/22/2021  Insurance Company: [employer] ([facility]) - Phone [phone] Fax [phone]  Comments:   Letter in letters tab

## 2021-03-23 ENCOUNTER — MYC MEDICAL ADVICE (OUTPATIENT)
Dept: RHEUMATOLOGY | Facility: CLINIC | Age: 56
End: 2021-03-23

## 2021-03-23 DIAGNOSIS — L40.50 PSORIATIC ARTHRITIS (H): Primary | ICD-10-CM

## 2021-03-23 LAB
ANA SER QL IF: NEGATIVE
CCP AB SER IA-ACNC: <1 U/ML
RHEUMATOID FACT SER NEPH-ACNC: <7 IU/ML (ref 0–20)

## 2021-03-23 NOTE — TELEPHONE ENCOUNTER
I am sorry to hear that the joints are painful and skin is worse after stopping Otezla.  1 possibility would be to utilize methotrexate for the short-term while awaiting results of appeal on the injectable medication.  Methotrexate is safe, and has a low incidence of side effects.  I recommend 15 mg (6 tablets) once weekly for 4 weeks, 2 refills.  Methotrexate is a slow acting medication however, and my recommendation would be to use a brief course of low-dose prednisone 15 mg daily for 1 week, then 10 mg daily for 1 week.

## 2021-03-24 RX ORDER — PREDNISONE 5 MG/1
TABLET ORAL
Qty: 35 TABLET | Refills: 1 | Status: SHIPPED | OUTPATIENT
Start: 2021-03-24 | End: 2021-05-04

## 2021-03-30 ENCOUNTER — TRANSFERRED RECORDS (OUTPATIENT)
Dept: HEALTH INFORMATION MANAGEMENT | Facility: CLINIC | Age: 56
End: 2021-03-30

## 2021-03-30 LAB
GAMMA INTERFERON BACKGROUND BLD IA-ACNC: 0.05 IU/ML
M TB IFN-G CD4+ BCKGRND COR BLD-ACNC: 9.95 IU/ML
M TB TUBERC IFN-G BLD QL: NEGATIVE
MITOGEN IGNF BCKGRD COR BLD-ACNC: 0 IU/ML
MITOGEN IGNF BCKGRD COR BLD-ACNC: 0 IU/ML

## 2021-04-12 NOTE — TELEPHONE ENCOUNTER
Called Franchesca at insurance to check status of an appeal - she stated the appeal was approved  3/31/2022    Appeal # [id number]    MEDICATION APPEAL APPROVED    Medication: HUMIRA - Appeal initiated   Authorization Effective Date:  3/30/2021  Authorization Expiration Date:  3/31/2022  Approved Dose/Quantity: 2 for 28 days   Reference #:     Insurance Company: [employer] ([facility]) - Phone [phone] Fax [phone]  Expected CoPay:     $20  CoPay Card Available:    yes  Foundation Assistance Needed:    Which Pharmacy is filling the prescription (Not needed for infusion/clinic administered): [facility] - [place], [place] - [address]

## 2021-04-25 ENCOUNTER — HEALTH MAINTENANCE LETTER (OUTPATIENT)
Age: 56
End: 2021-04-25

## 2021-05-04 ENCOUNTER — OFFICE VISIT (OUTPATIENT)
Dept: FAMILY MEDICINE | Facility: CLINIC | Age: 56
End: 2021-05-04
Payer: COMMERCIAL

## 2021-05-04 ENCOUNTER — TELEPHONE (OUTPATIENT)
Dept: ENDOCRINOLOGY | Facility: CLINIC | Age: 56
End: 2021-05-04

## 2021-05-04 VITALS
TEMPERATURE: 96.8 F | HEART RATE: 80 BPM | WEIGHT: 180.5 LBS | SYSTOLIC BLOOD PRESSURE: 140 MMHG | BODY MASS INDEX: 30.07 KG/M2 | HEIGHT: 65 IN | DIASTOLIC BLOOD PRESSURE: 85 MMHG | OXYGEN SATURATION: 99 %

## 2021-05-04 DIAGNOSIS — Z13.220 LIPID SCREENING: ICD-10-CM

## 2021-05-04 DIAGNOSIS — L40.50 PSORIATIC ARTHRITIS (H): ICD-10-CM

## 2021-05-04 DIAGNOSIS — E66.3 OVERWEIGHT: ICD-10-CM

## 2021-05-04 DIAGNOSIS — I10 BENIGN ESSENTIAL HYPERTENSION: Primary | ICD-10-CM

## 2021-05-04 DIAGNOSIS — R53.83 OTHER FATIGUE: ICD-10-CM

## 2021-05-04 LAB
CHOLEST SERPL-MCNC: 224 MG/DL (ref 0–200)
CHOLEST/HDLC SERPL: 5.1 {RATIO} (ref 0–5)
FASTING SPECIMEN: YES
HBA1C MFR BLD: 5 % (ref 4.1–5.7)
HDLC SERPL-MCNC: 44 MG/DL
LDLC SERPL CALC-MCNC: 131 MG/DL (ref 0–129)
TRIGL SERPL-MCNC: 244 MG/DL (ref 0–150)
VLDL-CHOLESTEROL: 49 (ref 7–32)

## 2021-05-04 RX ORDER — VITAMIN B COMPLEX
1 TABLET ORAL DAILY
Qty: 90 TABLET | Refills: 3 | COMMUNITY
Start: 2021-05-04

## 2021-05-04 ASSESSMENT — MIFFLIN-ST. JEOR: SCORE: 1409.62

## 2021-05-04 NOTE — PROGRESS NOTES
Jocelin eHrnandez is a 56 year old female with history of hypertension, psoriatic arthritis, subclinical hypothyroidism, chronic loose stools and rosacea. She is here for the following issues:    Benign essential hypertension  Jocelin has history of hypertension. At her last visit on 11/13/2020, she reported weight gain and was concerned this was secondary to atenolol use. Her atenolol dosage was reduced from 50 mg to 25mg daily. She was also started on losartan 50mg, however she is currently taking only a half tablet,  losartan 25 mg. Her at home BP cuff is accurate.  She has not taken her BP at home for a month. Denies chest pain or palpitations. Today's blood pressure reading are elevated.     Repeat BP: 158/96     BP Readings from Last 3 Encounters:   05/04/21 (!) 140/85   11/13/20 134/84   05/22/20 (!) 159/91     Weight management  Jocelin has been struggling to lose weight. She has eliminated dairy and gluten from her diet for a year. She eats out occasionally but mostly cooks at home. Her diet consists of protein and vegetables. She had tried intermittent fasting and keto diet in the past but they did not make significant changes to her weight. She drinks 1 glass of wine a few times a week and 1 cup of coffee daily. She is in a private fitness group and also does pilates.     Wt Readings from Last 4 Encounters:   05/04/21 81.9 kg (180 lb 8 oz)   11/13/20 78 kg (172 lb)   01/10/20 79 kg (174 lb 4 oz)   07/31/19 77.6 kg (171 lb)       Psoriatic arthritis (H)  Jocelin has chronic diffuse polyarthralgia involving her fingers, feet, knees, hips and lower back which has correlated with the severity of her psoriasis. Within the past few months, the psoriasis flared involving new rashes on the side of her back,  trunk and areas of her scalp. She was started on Otezla 20mg, which has provided better control of the psoriasis, however she has only been able to take it for 6 wks before stopping. She was taking a half dose  but stopped due to severe diarrhea and vomiting.      She was seen by rheumatology on 3/6/2021. Jocelin was started on Humira 40 mg every 14 days Rx. She is concerned about potential side effects but agrees to continuing medication.  She is tolerating the medication but it has not yet taken effect. She has fatigue and flaring joint pain in her hands, lower back, feet.     Chronic loose stools  Jocelin has tried eliminating dairy and gluten from her diet for the past year. She thinks this has helped her GI symptoms, but no significant change in psoriasis.  She recently had colonoscopy at [facility] and was told to return in 5 yr, polyps.     Patient Active Problem List   Diagnosis     Aftercare     Rosacea     Palpitations     Panic attack     Subclinical hypothyroidism     Poor sleep     Benign essential hypertension     Loose stools       Current Outpatient Medications   Medication Sig Dispense Refill     acetaminophen (TYLENOL) 500 MG tablet Take 1,000 mg by mouth       adalimumab (HUMIRA *CF*) 40 MG/0.4ML pen kit Inject 0.4 mLs (40 mg) Subcutaneous every 14 days 1 each 6     atenolol (TENORMIN) 25 MG tablet Take 1 tablet (25 mg) by mouth daily 90 tablet 3     azelastine (ASTELIN) 0.1 % nasal spray Spray 2 sprays into both nostrils 2 times daily 1 Bottle 11     estrogen conj-synthetic B (ENJUVIA) 0.625 MG tablet Take 1 tablet (0.625 mg) by mouth daily IMPLANTED ESTROGEN AND TESTOSTERONE PELLET, LEFT HIP 1 tablet 0     losartan (COZAAR) 50 MG tablet Take one daily 90 tablet 1     progesterone (PROMETRIUM) 100 MG capsule Take 1 capsule (100 mg) by mouth daily 90 capsule 3     Vitamin D3 (CHOLECALCIFEROL) 25 mcg (1000 units) tablet Take 1 tablet (25 mcg) by mouth daily 90 tablet 3       Allergies   Allergen Reactions     Hydrocortisone      Molds & Smuts      No Clinical Screening - See Comments Other (See Comments)     Seasonal Allergies         EXAM  BP (!) 140/85 (BP Location: Right arm, Patient Position: Sitting,  "Cuff Size: Adult Regular)   Pulse 80   Temp 96.8  F (36  C) (Temporal)   Ht 1.651 m (5' 5\")   Wt 81.9 kg (180 lb 8 oz)   SpO2 99%   BMI 30.04 kg/m    Gen: Alert, pleasant, NAD  Neck: no LAD or TM  COR: S1,S2, no murmur  LUNGS: CTA bilaterally, no rhonchi, wheezes or rales  Abd: soft, active BS, nontender  MS-HANDS: Tenderness over PIP joints of both hands bilaterally.   Wrists, elbows uninvolved.   Ext: no edema      Assessment:  (I10) Benign essential hypertension  (primary encounter diagnosis)  Comment: Elevated BP even after repeat.  Plan: recommend she increase losartan dose to 50mg daily, continue atenolol 25mg daily.  Patient Instructions   Losartan 50mg  Increase dose to full tablet 50mg    Send BP readings over then next week  Goal <140 / 90    Ideally 120-130/60-80  Max dose of losartan is 100mg daily.      (L40.50) Psoriatic arthritis (H)  Comment: current flare, recently started Humira  Plan: recommended she continue and allow therapeutic effect of medication     (E66.3) Overweight  Comment: Gained 2 lbs since last visit, has tried different dieting methods without imrpovement  Plan: COMPREHENSIVE WEIGHT MANAGEMENT        Refer to weight management clinic.    (R53.83) Other fatigue  Comment: significant fatigue, persistent, may be secondary to psoriatic arthritis flare  Plan: Hemoglobin A1c ([place]), Vitamin D         Deficiency        No evidence for DM. , A1c 5%.      (Z13.220) Lipid screening  Comment: Fasting  Plan: Lipid Panel ([place]), mixed picture          Recent Labs   Lab Test 05/04/21  1018   CHOL 224.0*   HDL 44.0*   .0*   TRIG 244.0*   CHOLHDLRATIO 5.1*   Recommend referral to weight loss clinic to help with weight loss, healthy diet.  Does not meet criteria to start lipid lowering medication  At this time.     48 minutes spend on the date of this encounter doing chart review, history and exam, documentation and further activities as noted above.       Debbie Grider, " MD  Internal Medicine/Pediatrics      I, Amisha Hatch, am serving as a scribe to document services personally performed by Debbie Grider MD, based on data collection and the provider's statements to me. Debbie Grider MD, has reviewed, edited, and approved the above note.

## 2021-05-04 NOTE — NURSING NOTE
"56 year old  Chief Complaint   Patient presents with     Hypertension     f/u BP and discuss humiria        Blood pressure (!) 140/85, pulse 80, temperature 96.8  F (36  C), temperature source Temporal, height 1.651 m (5' 5\"), weight 81.9 kg (180 lb 8 oz), SpO2 99 %. Body mass index is 30.04 kg/m .  Patient Active Problem List   Diagnosis     Aftercare     Rosacea     Palpitations     Panic attack     Subclinical hypothyroidism     Poor sleep     Benign essential hypertension     Loose stools       Wt Readings from Last 2 Encounters:   05/04/21 81.9 kg (180 lb 8 oz)   11/13/20 78 kg (172 lb)     BP Readings from Last 3 Encounters:   05/04/21 (!) 140/85   11/13/20 134/84   05/22/20 (!) 159/91         Current Outpatient Medications   Medication     acetaminophen (TYLENOL) 500 MG tablet     adalimumab (HUMIRA *CF*) 40 MG/0.4ML pen kit     atenolol (TENORMIN) 25 MG tablet     azelastine (ASTELIN) 0.1 % nasal spray     estrogen conj-synthetic B (ENJUVIA) 0.625 MG tablet     liraglutide (VICTOZA PEN) 18 MG/3ML solution     losartan (COZAAR) 50 MG tablet     progesterone (PROMETRIUM) 100 MG capsule     methotrexate 2.5 MG tablet     NP THYROID PO     predniSONE (DELTASONE) 5 MG tablet     Current Facility-Administered Medications   Medication     hylan (SYNVISC ONE) injection 48 mg     lidocaine 1 % injection 3 mL     lidocaine 1 % injection 4 mL     triamcinolone acetonide (KENALOG-40) injection 40 mg       Social History     Tobacco Use     Smoking status: Never Smoker     Smokeless tobacco: Never Used   Substance Use Topics     Alcohol use: Yes     Alcohol/week: 1.0 standard drinks     Types: 1 Glasses of wine per week     Comment: socially     Drug use: No       Health Maintenance Due   Topic Date Due     PREVENTIVE CARE VISIT  Never done     ADVANCE CARE PLANNING  Never done     MENTAL HEALTH TX PLAN  Never done     COLORECTAL CANCER SCREENING  Never done     HIV SCREENING  Never done     HEPATITIS C SCREENING  Never " done     LIPID  Never done     ZOSTER IMMUNIZATION (1 of 2) Never done     PHQ-2  01/01/2021     PAP  06/01/2021       No results found for: PAP      May 4, 2021 9:18 AM

## 2021-05-04 NOTE — PATIENT INSTRUCTIONS
Losartan 50mg  Increase dose to full tablet 50mg    Send BP readings over then next week    Goal <140 / 90    Ideally 120-130/60-80  Max dose of losartan is 100mg daily

## 2021-05-05 LAB — DEPRECATED CALCIDIOL+CALCIFEROL SERPL-MC: 85 UG/L (ref 20–75)

## 2021-05-05 NOTE — TELEPHONE ENCOUNTER
REFERRAL INFORMATION:    Referring Provider:  Dr. Debbie Grider     Referring Clinic:  [facility]     Reason for Visit/Diagnosis: New MWM, BMI 30       FUTURE VISIT INFORMATION:    Appointment Date: 5/7/2021    Appointment Time: 7:30 AM     NOTES RECORD STATUS  DETAILS   OFFICE NOTE from Referring Provider Internal 5/4/2021, 5/22/2020 Office visit with Dr. Grider      OFFICE NOTE from Other Specialists N/A    HOSPITAL DISCHARGE SUMMARY/ ED VISITS  N/A    OPERATIVE REPORT N/A    ENDOSCOPY (EGD)  N/A    PERTINENT LABS Internal/ Care Everywhere    PATHOLOGY REPORTS (RELATED) N/A    IMAGING (CT, MRI, US, XR)  N/A

## 2021-05-06 ENCOUNTER — TELEPHONE (OUTPATIENT)
Dept: ENDOCRINOLOGY | Facility: CLINIC | Age: 56
End: 2021-05-06

## 2021-05-06 NOTE — TELEPHONE ENCOUNTER
Called informing pt to complete questionnaire on MyChart prior to visit.  Pt is scheduled with Elizabeth Dinh at 7:30 am and an appt with the dietician at 10:30 am    Called patient inform the patient that there will be a pre-visit call 20-30 minutes prior to her visit.

## 2021-05-06 NOTE — PROGRESS NOTES
"Jocelin Hernandez is a 56 year old female who is being evaluated via a billable video visit.      The patient has been notified of following:     \"This video visit will be conducted via a call between you and your physician/provider. We have found that certain health care needs can be provided without the need for an in-person physical exam.  This service lets us provide the care you need with a video conversation.  If a prescription is necessary we can send it directly to your pharmacy.  If lab work is needed we can place an order for that and you can then stop by our lab to have the test done at a later time.    Video visits are billed at different rates depending on your insurance coverage.  Please reach out to your insurance provider with any questions.    If during the course of the call the physician/provider feels a video visit is not appropriate, you will not be charged for this service.\"    Patient has given verbal consent for Video visit? Yes  How would you like to obtain your AVS? MyChart  Will anyone else be joining your video visit? No        Video-Visit Details    Type of service:  Video Visit    Video Start Time:  10:27 AM  Video End Time: 11:03 AM    Originating Location (pt. Location): Home    Distant Location (provider location):  [facility] [place]     Platform used for Video Visit: [employer]    During this virtual visit the patient is located in [place], patient verifies this as the location during the entirety of this visit.     New Weight Management Nutrition Consultation    Jocelin Hernandez is a 56 year old female presents today for new weight management nutrition consultation.  Patient referred by Elizabeth Dinh NP on May 7, 2021.    Patient with Co-morbidities of obesity including:  Type II DM no  Renal Failure no  Sleep apnea no  Hypertension yes   Dyslipidemia yes  Joint pain yes  Back pain yes  GERD no     Anthropometrics:  Estimated body mass index is 30.04 kg/m  " "as calculated from the following:    Height as of 5/4/21: 1.651 m (5' 5\").    Weight as of 5/4/21: 81.9 kg (180 lb 8 oz).    Medications for Weight Loss:  Victoza    Vitamin and mineral supplements:  Calcium     NUTRITION HISTORY  See MD note for details.    Per questionnaire: new psoriasis (skin and joints)diagnosis, nerve impingement at L4/5, did AIP [autoimmune protocol diet] & timed eating at beginning of year and then altered that because of GI side effects from Oetzla.    Per discussion with pt today: Follows a gluten-free, corn-free and diary-free diet, sometimes small amount milk or cheese. Current severe inflammation, trying to figure out if triggered by food or something else.   Has been told she has leaky gut. Tried AIP protocol for a month (eliminated corn after this), tried low-FODMAP diet (followed for about a month).  Last night had lentil pasta (1.5 cup), felt very bloated after having.    Previous experiences with wt loss: Has done Atkins, Keto diet in the past with success. However notes very challenging to stay in ketosis (would come out above ingestions of 20 gm carb/day). Has counted calories and done WW point system in the past, following 1200 calories/per day. Pt not sure if this was more helpful vs harmful to MH.     Cut-out red meat at the beginning of the year. Also since the beginning of the year, focusing on lean protein (less eggs d/t inflammation), low carb and large portions of well tolerated non-starchy veggies. Pt looking for new breakfast ideas, more hesitant about changing lunch and dinner meals b/c she knows she tolerates these foods well, however does note she is likely missing out on some essential nutrients by eating the same things. Pt also wondering what changes she can make to diet to help improve lipid panel.     Recent food recall:  If you do eat breakfast, what types of food do you eat? eggs, left over protein; had done oatmeal in the past   Do you typically eat lunch? " Yes   If you do eat lunch, what types of food do you typically eat?  Deli Turkey with shredded lettuce   Do you typically eat supper? Yes   If you do eat supper, what types of food do you typically eat? protein (mostly chicken), veggies; lettuce and chicken breast on the side.    Do you typically eat snacks? Yes   If you do snack, what types of food do you typically eat? nuts, turkey slice, rarely cheese     Beverages: Water (not enough), coffee in the am, diet coke     Physical Activity:  Pilates - twice per week. Increased intensity/frequency of exercise limited by back and knee pain.     Nutrition Prescription  Recommended energy/nutrient modification.  1000 calories/day     Nutrition Diagnosis  Obesity r/t long history of self-monitoring deficit and excessive energy intake aeb BMI >30.    Nutrition Intervention  Materials/education provided on 1000 calorie/day diet, Volumetric eating to help satiety level on fewer calories; portion control and healthy food choices, 100 calorie snack choices, meal and snack planning and MNT for dyslipidemia.   Encouraged pt to keep a food journal to help with mindfulness and accountability. If pt chooses to count calories, discussed following 1000 calories/day.   Encouraged pt to work on reducing carbonation and caffeine. Discussed beverage alternatives.  Discussed meal ideas for breakfast.   Encouraged increased exercise as able.   Advised pt to start a complete multivitamin with minerals.   Patient demonstrates understanding.    Expected Engagement: good  Follow-Up Plans: Food log, meal planning     Nutrition Goals  1) Eat 3 meals per day  2) One snack per day  3) Keep a food journal   4) Increase water intake, aim for 48+ oz per day  5) Decrease caffine intake. Consider mixing regular coffee with 50% decaf.   6) Eliminate carbonated beverages  7) Start a daily complete multivitamin with minerals (chewable for better tolerance)  8) Increase activity as able.     The Plate  Method  [email or web address]    Protein Sources for Weight Loss  [email or web address]     Carbohydrates  [email or web address]     Mindful Eating  [email or web address]     Summary of Volumetrics Eating Plan  [email or web address]     Follow-Up:  Monthly or PRN    Time spent with patient: 36 minutes.  Lala Forte RD, LD

## 2021-05-07 ENCOUNTER — VIRTUAL VISIT (OUTPATIENT)
Dept: ENDOCRINOLOGY | Facility: CLINIC | Age: 56
End: 2021-05-07
Payer: COMMERCIAL

## 2021-05-07 ENCOUNTER — PRE VISIT (OUTPATIENT)
Dept: ENDOCRINOLOGY | Facility: CLINIC | Age: 56
End: 2021-05-07

## 2021-05-07 ENCOUNTER — TELEPHONE (OUTPATIENT)
Dept: ENDOCRINOLOGY | Facility: CLINIC | Age: 56
End: 2021-05-07

## 2021-05-07 VITALS — HEIGHT: 65 IN | BODY MASS INDEX: 29.99 KG/M2 | WEIGHT: 180 LBS

## 2021-05-07 DIAGNOSIS — R14.0 BLOATING: ICD-10-CM

## 2021-05-07 DIAGNOSIS — R12 HEART BURN: ICD-10-CM

## 2021-05-07 DIAGNOSIS — K90.49 FOOD INTOLERANCE: ICD-10-CM

## 2021-05-07 DIAGNOSIS — E66.9 OBESITY: ICD-10-CM

## 2021-05-07 DIAGNOSIS — E03.8 SUBCLINICAL HYPOTHYROIDISM: ICD-10-CM

## 2021-05-07 DIAGNOSIS — Z71.3 NUTRITIONAL COUNSELING: Primary | ICD-10-CM

## 2021-05-07 DIAGNOSIS — R19.5 LOOSE STOOLS: ICD-10-CM

## 2021-05-07 DIAGNOSIS — I10 BENIGN ESSENTIAL HYPERTENSION: ICD-10-CM

## 2021-05-07 DIAGNOSIS — E66.811 OBESITY, CLASS I, BMI 30-34.9: Primary | ICD-10-CM

## 2021-05-07 DIAGNOSIS — E88.810 METABOLIC SYNDROME: ICD-10-CM

## 2021-05-07 PROBLEM — K21.9 GASTROESOPHAGEAL REFLUX DISEASE, UNSPECIFIED WHETHER ESOPHAGITIS PRESENT: Status: ACTIVE | Noted: 2021-05-07

## 2021-05-07 PROCEDURE — 97802 MEDICAL NUTRITION INDIV IN: CPT | Mod: GT | Performed by: DIETITIAN, REGISTERED

## 2021-05-07 PROCEDURE — 99205 OFFICE O/P NEW HI 60 MIN: CPT | Mod: GT | Performed by: NURSE PRACTITIONER

## 2021-05-07 ASSESSMENT — PAIN SCALES - GENERAL: PAINLEVEL: NO PAIN (0)

## 2021-05-07 ASSESSMENT — MIFFLIN-ST. JEOR: SCORE: 1407.35

## 2021-05-07 NOTE — ASSESSMENT & PLAN NOTE
"Weight has become more of an issue in the last 5 years. Had chronic knee issues with a couple of procedures to repair but mobility was overall much less during this time. Notes chronic high levels of stress as well.     Describes some stress eating but not a lot of hunger. Limited diet but restrictions change over time. Difficult to know what her baseline caloric intake would look like. May consider food journal in the future?     Was seen by OBGYN  Who was able to get victoza covered. She did experience decreased inflammation, decreased diarrhea, and improved appetite when taking this. She was off for the last month because she ran out. Restarted in the last week. Currently taking. Indigestion doesn't seem to be worse with this. Do suspect a component of metabolic syndrome elevated glucose readings, hyperlipidemia, central adiposity. Will see if we can get ozempic covered instead- decreased side effects and frequency of injections.    The OBGYN was also working on \"balancing hormones\" from menopause for weight loss. She was using hormonal \"pellets\" but never saw any benefit. She also felt she was maybe \"estrogen heavy\" with this. Open to seeing endocrinologist with our team for further discussion as I do not do hormone replacements.      Recommend working with dietitian. Suspect she is getting insufficient CHO with current diet but does likely need a hypo caloric diet to help with weight loss. Could consider GI RD in the future as well.     Recommend increasing water and decreasing artificial sweetness and carbonation. Consider HINT instead.   "

## 2021-05-07 NOTE — LETTER
"2021       RE: Jocelin Hernandez  [address] [facility] [facility] [address] [address]     Dear Colleague,    Thank you for referring your patient, Jocelin Hernandez, to the [facility] [place] at [facility]. Please see a copy of my visit note below.    New Medical Weight Management Consult    PATIENT:  Jocelin Hernandez  MRN:         [id number]  :         1965  TENA:         2021    Dear Dr. Grider,     I had the pleasure of seeing your patient, Jocelin Hernandez. Full intake/assessment was done to determine barriers to weight loss success and develop a treatment plan. Jocelin Hernandez is a 56 year old female interested in treatment of medical problems associated with excess weight. She has a height of 5' 5\", a weight of 180 lbs 0 oz, and the calculated Body mass index is 29.95 kg/m .    Assessment & Plan   Problem List Items Addressed This Visit        Digestive    Heart burn     Chronic, takes tums occasionally. Feels it is related to food intake but difficult to pinpoint foods.          Relevant Orders    GASTROENTEROLOGY ADULT REF CONSULT ONLY       Endocrine    Subclinical hypothyroidism     Previously managed at private pay OBGYN. Used compounded thyroid natural products. Feels best when TSH is around 1. Not recently checked.          Metabolic syndrome    Relevant Medications    Semaglutide,0.25 or 0.5MG/DOS, 2 MG/1.5ML SOPN       Circulatory    Benign essential hypertension       Other    Loose stools     GI symptoms wax and wane over time. Chronic issues (10+ years) has seen GI in 2018 and recommended FODMAP diet after colonoscopy. She saw little benefit from the dietary changes and has since eliminated many different foods over time to determine trigger but unable to pinpoint issue.     GI symptoms dramatically impact her eating patterns and what she eats. She will sometimes feel like she needs to skip dinner because " "that is when she feels the worst.     Most recent colonoscopy was in 4/2021. Had complete resolution of symptoms after colonoscopy for about a month. She describes this as having normal bowel movements- not hard, not diarrhea. Less bloating and indigestion. Gradual return of these symptoms over time.     New diagnosis of psoriasis and possible autoimmune diagnosis. Started Humira x 1 dose. Has not gotten results of biopsy from colonoscopy.     I question an IBS diagnosis and would like her to see GI to evaluate further. She agrees.            Relevant Orders    GASTROENTEROLOGY ADULT REF CONSULT ONLY    Obesity, Class I, BMI 30-34.9 - Primary     Weight has become more of an issue in the last 5 years. Had chronic knee issues with a couple of procedures to repair but mobility was overall much less during this time. Notes chronic high levels of stress as well.     Describes some stress eating but not a lot of hunger. Limited diet but restrictions change over time. Difficult to know what her baseline caloric intake would look like. May consider food journal in the future?     Was seen by OBGYN  Who was able to get victoza covered. She did experience decreased inflammation, decreased diarrhea, and improved appetite when taking this. She was off for the last month because she ran out. Restarted in the last week. Currently taking. Indigestion doesn't seem to be worse with this. Do suspect a component of metabolic syndrome elevated glucose readings, hyperlipidemia, central adiposity. Will see if we can get ozempic covered instead- decreased side effects and frequency of injections.    The OBGYN was also working on \"balancing hormones\" from menopause for weight loss. She was using hormonal \"pellets\" but never saw any benefit. She also felt she was maybe \"estrogen heavy\" with this. Open to seeing endocrinologist with our team for further discussion as I do not do hormone replacements.      Recommend working with dietitian. " Suspect she is getting insufficient CHO with current diet but does likely need a hypo caloric diet to help with weight loss. Could consider GI RD in the future as well.     Recommend increasing water and decreasing artificial sweetness and carbonation. Consider HINT instead.          Relevant Medications    Semaglutide,0.25 or 0.5MG/DOS, 2 MG/1.5ML SOPN    Food intolerance     Because of GI symptoms she has eliminated many foods from diet. She has not had gluten in 10 years. Minimal lactose, if any. Diet is very bland- chicken, lettuce, eggs. Has had more CHO in the last months- feeling overwhelmed with the work up for her psoriasis and the pain she was experiencing. Numerous elimination diets over the years.          Relevant Orders    GASTROENTEROLOGY ADULT REF CONSULT ONLY    Bloating    Relevant Orders    GASTROENTEROLOGY ADULT REF CONSULT ONLY           Review of external notes as documented above     88 minutes spent on the date of the encounter doing chart review, history and exam, documentation and further activities per the note    MEDICATIONS:        - Continue other medications without change       - will send ozempic in to see if covered, continue victoza until then   CONSULTATION/REFERRAL to GI   FUTURE APPOINTMENTS:       - Follow-up visit in 1 month with Dr. Sun        - Continue with RD     Jocelin FLAKO Hernandez is a 56 year old female who presents to clinic today for the following health issues       She has the following co-morbidities:       5/4/2021   I have the following health issues associated with obesity: High Blood Pressure, High Cholesterol, Osteoarthritis (joint disease), Hypothyroidism   I have the following symptoms associated with obesity: Knee Pain, Back Pain, Fatigue, Hip Pain     A1C 5  Hyperlipidemia   Low dose prednisone for polyarthralgia - rheumatology work up underway     Diagnosed with psoriasis 1/2021- significant pain, GI side effects from medications, overwhelmed     humira  "started for psoriasis, has only taken a single dose- feels dryer now       Patient Goals 5/4/2021   I am interested in having a healthier weight to diminish current health problems: Yes   I am interested in having a healthier weight in order to prevent future health problems: Yes   I am interested in having a healthier weight in order to have a future surgery: No   If yes, please indicate which surgery? none       Referring Provider 5/4/2021   Please name the provider who referred you to Medical Weight Management.  If you do not know, please answer: \"I Don't Know\". Debbie Grider       Weight History 5/4/2021   How concerned are you about your weight? Very Concerned   Would you describe your weight gain as gradual? Yes   I became overweight: After Pregnancy   The following factors have contributed to my weight gain:  Started on Medication that Caused Weight Gain, A Health Crisis, Lack of Exercise, Stress   I have tried the following methods to lose weight: Watching Portions or Calories, Exercise, Atkins-type Diet (Low Carb/High Protein), Meal Replacements, Fasting   My lowest weight since age 18 was: 135   My highest weight since age 18 was: 180   The most weight I have ever lost was: (lbs) 30   I have the following family history of obesity/being overweight:  Many of my relatives are overweight   Has anyone in your family had weight loss surgery? No   How has your weight changed over the last year?  Gained   How many pounds? 10     Knee pain and procedures on decreasing activity for the last 4-5 years   Menopause for 5-7 years   Some success with a meal replacement program -       Diet Recall Review with Patient 5/4/2021   If you do eat breakfast, what types of food do you eat? eggs, left over protein   Do you typically eat lunch? Yes   If you do eat lunch, what types of food do you typically eat?  turkey/lettuce, salad   Do you typically eat supper? Yes   If you do eat supper, what types of food do you typically " "eat? protein (mostly chicken), veggies   Do you typically eat snacks? Yes   If you do snack, what types of food do you typically eat? nuts, turkey slice, rarely cheese   Do you like vegetables?  Yes   Do you drink water? Yes   How many glasses of juice do you drink in a typical day? 0   How many of glasses of milk do you drink in a typical day? 0   If you do drink milk, what type? N/A   How many 8oz glasses of sugar containing drinks such as Yasmany-Aid/sweet tea do you drink in a day? 0   How many cans/bottles of sugar pop/soda/tea/sports drinks do you drink in a day? 0   How many cans/bottles of diet pop/soda/tea or sports drink do you drink in a day? 1   How often do you have a drink of alcohol? 4 or More Times a Week   If you do drink, how many drinks might you have in a day? 1 or 2     Breakfast- 2 eggs   Lunch- turkey on a salad   Dinner- protein and veggies with olive oil     Not generally hungry but does have some episodes where she gets nauseated and blood sugar drops. Will eat something to luevano off that feeling- granola bar, chicken, eggs something quick.     With GI side effects from medications a couple months ago was eating more carbs because they settled    Saw GI 2018, colonoscopy in 2018 and 4/2021. Bloating and indigestion after dinner usually , sometimes diarrhea in the morning again. Not specifically     TSH \"feels better\" at 1     Has tried victoza- decreased appetite and slowed absorption of food. Has been off of it because she hasn't followed up with the endocrine who had prescribed it.     Eating Habits 5/4/2021   Generally, my meals include foods like these: bread, pasta, rice, potatoes, corn, crackers, sweet dessert, pop, or juice. Never   Generally, my meals include foods like these: fried meats, brats, burgers, french fries, pizza, cheese, chips, or ice cream. Once a Week   Eat fast food (like McDonal[employer], [employer], [employer]aco [employer]). Never   Eat at a buffet or sit-down restaurant. Never   Eat most " of my meals in front of the TV or computer. A Few Times a Week   Often skip meals, eat at random times, have no regular eating times. Almost Everyday   Rarely sit down for a meal but snack or graze throughout.  Less Than Weekly   Eat extra snacks between meals. Less Than Weekly   Eat most of my food at the end of the day. Less Than Weekly   Eat in the middle of the night or wake up at night to eat. Once a Week   Eat extra snacks to prevent or correct low blood sugar. A Few Times a Week   Eat to prevent acid reflux or stomach pain. A Few Times a Week   Worry about not having enough food to eat. Never   Have you been to the food shelf at least a few times this year? No   I eat when I am depressed. Never   I eat when I am stressed. Once a Week   I eat when I am bored. Never   I eat when I am anxious. Less Than Weekly   I eat when I am happy or as a reward. Never   I feel hungry all the time even if I just have eaten. Never   Feeling full is important to me. Less Than Weekly   I finish all the food on my plate even if I am already full. Never   I can't resist eating delicious food or walk past the good food/smell. Less Than Weekly   I eat/snack without noticing that I am eating. Never   I eat when I am preparing the meal. Less Than Weekly   I eat more than usual when I see others eating. Never   I have trouble not eating sweets, ice cream, cookies, or chips if they are around the house. Never   I think about food all day. Never   What foods, if any, do you crave? None     Eliminated gluten 10 years ago  Sugar intake very low   Not a lot of dairy- inflammation   Cut out diet soda   Doesn't like water- feels like she doesn't get enough     Amount of Food 5/4/2021   I make myself vomit what I have eaten or use laxatives to get rid of food. Never   I eat a large amount of food, like a loaf of bread, a box of cookies, a pint/quart of ice cream, all at once. Never   I eat a large amount of food even when I am not hungry.  Never   I eat rapidly. Never   I eat alone because I feel embarrassed and do not want others to see how much I have eaten. Never   I eat until I am uncomfortably full. Never   I feel bad, disgusted, or guilty after I overeat. Monthly   I make myself vomit what I have eaten or use laxatives to get rid of food. Never       Activity/Exercise History 5/4/2021   How much of a typical 12 hour day do you spend sitting? Half the Day   How much of a typical 12 hour day do you spend lying down? Less Than Half the Day   How much of a typical day do you spend walking/standing? Less Than Half the Day   How many hours (not including work) do you spend on the TV/Video Games/Computer/Tablet/Phone? 2-3 Hours   How many times a week are you active for the purpose of exercise? 2-3 Times a Week   What keeps you from being more active? Pain   How many total minutes do you spend doing some activity for the purpose of exercising when you exercise? More Than 30 Minutes       PAST MEDICAL HISTORY:  Past Medical History:   Diagnosis Date     Hypertension      Hypothyroid      Iliotibial band syndrome affecting left lower leg 11/23/2018     Palpitations      Pneumonia      Shortness of breath        Work/Social History Reviewed With Patient 5/4/2021   My employment status is: Stay at Home Parent   My job is: stay at home parent   How many hours do you spend commuting to work daily?  0   What is your marital status? /In a Relationship   If in a relationship, is your significant other overweight? No   Do you have children? Yes   If you have children, are they overweight? Yes   Who do you live with?   and son   Are they supportive of your health goals? Yes   Who does the food shopping?  me       Mental Health History Reviewed With Patient 5/4/2021   Have you ever been physically or sexually abused? No   If yes, do you feel that the abuse is affecting your weight? N/A   If yes, would you like to talk to a counselor about the abuse?  N/A   How often in the past 2 weeks have you felt little interest or pleasure in doing things? For Several Days   Over the past 2 weeks how often have you felt down, depressed, or hopeless? Not at all     Lots of stress-  has personality disorder   Works with therapist       Sleep History Reviewed With Patient 5/4/2021   How many hours do you sleep at night? 7   Do you think that you snore loudly or has anybody ever heard you snore loudly (louder than talking or so loud it can be heard behind a shut door)? No   Has anyone seen or heard you stop breathing during your sleep? No   Do you often feel tired, fatigued, or sleepy during the day? No   Do you have a TV/Computer in your bedroom? Yes       MEDICATIONS:   Current Outpatient Medications   Medication Sig Dispense Refill     acetaminophen (TYLENOL) 500 MG tablet Take 1,000 mg by mouth       adalimumab (HUMIRA *CF*) 40 MG/0.4ML pen kit Inject 0.4 mLs (40 mg) Subcutaneous every 14 days 1 each 6     atenolol (TENORMIN) 25 MG tablet Take 1 tablet (25 mg) by mouth daily 90 tablet 3     azelastine (ASTELIN) 0.1 % nasal spray Spray 2 sprays into both nostrils 2 times daily 1 Bottle 11     estrogen conj-synthetic B (ENJUVIA) 0.625 MG tablet Take 1 tablet (0.625 mg) by mouth daily IMPLANTED ESTROGEN AND TESTOSTERONE PELLET, LEFT HIP 1 tablet 0     losartan (COZAAR) 50 MG tablet Take one daily 90 tablet 1     progesterone (PROMETRIUM) 100 MG capsule Take 1 capsule (100 mg) by mouth daily 90 capsule 3     Semaglutide,0.25 or 0.5MG/DOS, 2 MG/1.5ML SOPN Inject 0.25 mg subcutaneously once weekly for 4 weeks then 0.5 mg once weekly. 1.5 mL 1     Vitamin D3 (CHOLECALCIFEROL) 25 mcg (1000 units) tablet Take 1 tablet (25 mcg) by mouth daily 90 tablet 3       ALLERGIES:   Allergies   Allergen Reactions     Hydrocortisone      Molds & Smuts      No Clinical Screening - See Comments Other (See Comments)     Otezla [Apremilast] GI Disturbance     Vomiting      Seasonal Allergies   "      Office Visit on 05/04/2021   Component Date Value Ref Range Status     Hemoglobin A1C 05/04/2021 5.0  4.1 - 5.7 % Final     FASTING SPECIMEN 05/04/2021 YES   Final     Cholesterol 05/04/2021 224.0* 0.0 - 200.0 Final     HDL Cholesterol 05/04/2021 44.0* >50.0 Final     Triglycerides 05/04/2021 244.0* 0.0 - 150.0 Final     Cholesterol/HDL Ratio 05/04/2021 5.1* 0.0 - 5.0 Final     LDL Cholesterol Direct 05/04/2021 131.0* 0.0 - 129.0 Final     VLDL-Cholesterol 05/04/2021 49.0* 7.0 - 32.0 Final     Vitamin D Deficiency screening 05/04/2021 85* 20 - 75 ug/L Final    Comment: Season, race, dietary intake, and treatment affect the concentration of   25-hydroxy-Vitamin D. Values may decrease during winter months and increase   during summer months. Values 20-29 ug/L may indicate Vitamin D insufficiency   and values <20 ug/L may indicate Vitamin D deficiency.  Vitamin D determination is routinely performed by an immunoassay specific for   25 hydroxyvitamin D3.  If an individual is on vitamin D2 (ergocalciferol)   supplementation, please specify 25 OH vitamin D2 and D3 level determination by   LCMSMS test [id number].           PHYSICAL EXAM:  Objective    Ht 1.651 m (5' 5\")   Wt 81.6 kg (180 lb)   BMI 29.95 kg/m    Vitals - Patient Reported  Pain Score: No Pain (0)      Vitals:  No vitals were obtained today due to virtual visit.    Physical Exam   GENERAL: Healthy, alert and no distress  EYES: Eyes grossly normal to inspection.  No discharge or erythema, or obvious scleral/conjunctival abnormalities.  RESP: No audible wheeze, cough, or visible cyanosis.  No visible retractions or increased work of breathing.    SKIN: Visible skin clear. No significant rash, abnormal pigmentation or lesions.  NEURO: Cranial nerves grossly intact.  Mentation and speech appropriate for age.  PSYCH: Mentation appears normal, affect normal/bright, judgement and insight intact, normal speech and appearance " well-groomed.        Sincerely,    Elizabeth Dinh NP        juan m is a 56 year old who is being evaluated via a billable video visit.      How would you like to obtain your AVS? MyChart  If the video visit is dropped, the invitation should be resent by: Text to cell phone: [phone]  Will anyone else be joining your video visit? No      Video Start Time: 0730  Video-Visit Details    Type of service:  Video Visit    Video End Time:0816    Originating Location (pt. Location): Home    Distant Location (provider location):  [facility] WEIGHT MANAGEMENT CLINIC [place]     Platform used for Video Visit: HengZhi

## 2021-05-07 NOTE — PROGRESS NOTES
"New Medical Weight Management Consult    PATIENT:  Jocelin Hernandez  MRN:         [id number]  :         1965  TENA:         2021    Dear Dr. Grider,     I had the pleasure of seeing your patient, Jocelin Hernandez. Full intake/assessment was done to determine barriers to weight loss success and develop a treatment plan. Jocelin Hernandez is a 56 year old female interested in treatment of medical problems associated with excess weight. She has a height of 5' 5\", a weight of 180 lbs 0 oz, and the calculated Body mass index is 29.95 kg/m .    Assessment & Plan   Problem List Items Addressed This Visit        Digestive    Heart burn     Chronic, takes tums occasionally. Feels it is related to food intake but difficult to pinpoint foods.          Relevant Orders    GASTROENTEROLOGY ADULT REF CONSULT ONLY       Endocrine    Subclinical hypothyroidism     Previously managed at private pay OBGYN. Used compounded thyroid natural products. Feels best when TSH is around 1. Not recently checked.          Metabolic syndrome    Relevant Medications    Semaglutide,0.25 or 0.5MG/DOS, 2 MG/1.5ML SOPN       Circulatory    Benign essential hypertension       Other    Loose stools     GI symptoms wax and wane over time. Chronic issues (10+ years) has seen GI in 2018 and recommended FODMAP diet after colonoscopy. She saw little benefit from the dietary changes and has since eliminated many different foods over time to determine trigger but unable to pinpoint issue.     GI symptoms dramatically impact her eating patterns and what she eats. She will sometimes feel like she needs to skip dinner because that is when she feels the worst.     Most recent colonoscopy was in 2021. Had complete resolution of symptoms after colonoscopy for about a month. She describes this as having normal bowel movements- not hard, not diarrhea. Less bloating and indigestion. Gradual return of these symptoms over time.     New diagnosis of psoriasis " "and possible autoimmune diagnosis. Started Humira x 1 dose. Has not gotten results of biopsy from colonoscopy.     I question an IBS diagnosis and would like her to see GI to evaluate further. She agrees.            Relevant Orders    GASTROENTEROLOGY ADULT REF CONSULT ONLY    Obesity, Class I, BMI 30-34.9 - Primary     Weight has become more of an issue in the last 5 years. Had chronic knee issues with a couple of procedures to repair but mobility was overall much less during this time. Notes chronic high levels of stress as well.     Describes some stress eating but not a lot of hunger. Limited diet but restrictions change over time. Difficult to know what her baseline caloric intake would look like. May consider food journal in the future?     Was seen by OBGYN  Who was able to get victoza covered. She did experience decreased inflammation, decreased diarrhea, and improved appetite when taking this. She was off for the last month because she ran out. Restarted in the last week. Currently taking. Indigestion doesn't seem to be worse with this. Do suspect a component of metabolic syndrome elevated glucose readings, hyperlipidemia, central adiposity. Will see if we can get ozempic covered instead- decreased side effects and frequency of injections.    The OBGYN was also working on \"balancing hormones\" from menopause for weight loss. She was using hormonal \"pellets\" but never saw any benefit. She also felt she was maybe \"estrogen heavy\" with this. Open to seeing endocrinologist with our team for further discussion as I do not do hormone replacements.      Recommend working with dietitian. Suspect she is getting insufficient CHO with current diet but does likely need a hypo caloric diet to help with weight loss. Could consider GI RD in the future as well.     Recommend increasing water and decreasing artificial sweetness and carbonation. Consider HINT instead.          Relevant Medications    Semaglutide,0.25 or " 0.5MG/DOS, 2 MG/1.5ML SOPN    Food intolerance     Because of GI symptoms she has eliminated many foods from diet. She has not had gluten in 10 years. Minimal lactose, if any. Diet is very bland- chicken, lettuce, eggs. Has had more CHO in the last months- feeling overwhelmed with the work up for her psoriasis and the pain she was experiencing. Numerous elimination diets over the years.          Relevant Orders    GASTROENTEROLOGY ADULT REF CONSULT ONLY    Bloating    Relevant Orders    GASTROENTEROLOGY ADULT REF CONSULT ONLY           Review of external notes as documented above     88 minutes spent on the date of the encounter doing chart review, history and exam, documentation and further activities per the note    MEDICATIONS:        - Continue other medications without change       - will send ozempic in to see if covered, continue victoza until then   CONSULTATION/REFERRAL to GI   FUTURE APPOINTMENTS:       - Follow-up visit in 1 month with Dr. Sun        - Continue with HARINDER Monreal FLAKO Hernandez is a 56 year old female who presents to clinic today for the following health issues       She has the following co-morbidities:       5/4/2021   I have the following health issues associated with obesity: High Blood Pressure, High Cholesterol, Osteoarthritis (joint disease), Hypothyroidism   I have the following symptoms associated with obesity: Knee Pain, Back Pain, Fatigue, Hip Pain     A1C 5  Hyperlipidemia   Low dose prednisone for polyarthralgia - rheumatology work up underway     Diagnosed with psoriasis 1/2021- significant pain, GI side effects from medications, overwhelmed     humira started for psoriasis, has only taken a single dose- feels dryer now       Patient Goals 5/4/2021   I am interested in having a healthier weight to diminish current health problems: Yes   I am interested in having a healthier weight in order to prevent future health problems: Yes   I am interested in having a healthier weight in  "order to have a future surgery: No   If yes, please indicate which surgery? none       Referring Provider 5/4/2021   Please name the provider who referred you to Medical Weight Management.  If you do not know, please answer: \"I Don't Know\". Debbie Grider       Weight History 5/4/2021   How concerned are you about your weight? Very Concerned   Would you describe your weight gain as gradual? Yes   I became overweight: After Pregnancy   The following factors have contributed to my weight gain:  Started on Medication that Caused Weight Gain, A Health Crisis, Lack of Exercise, Stress   I have tried the following methods to lose weight: Watching Portions or Calories, Exercise, Atkins-type Diet (Low Carb/High Protein), Meal Replacements, Fasting   My lowest weight since age 18 was: 135   My highest weight since age 18 was: 180   The most weight I have ever lost was: (lbs) 30   I have the following family history of obesity/being overweight:  Many of my relatives are overweight   Has anyone in your family had weight loss surgery? No   How has your weight changed over the last year?  Gained   How many pounds? 10     Knee pain and procedures on decreasing activity for the last 4-5 years   Menopause for 5-7 years   Some success with a meal replacement program -       Diet Recall Review with Patient 5/4/2021   If you do eat breakfast, what types of food do you eat? eggs, left over protein   Do you typically eat lunch? Yes   If you do eat lunch, what types of food do you typically eat?  turkey/lettuce, salad   Do you typically eat supper? Yes   If you do eat supper, what types of food do you typically eat? protein (mostly chicken), veggies   Do you typically eat snacks? Yes   If you do snack, what types of food do you typically eat? nuts, turkey slice, rarely cheese   Do you like vegetables?  Yes   Do you drink water? Yes   How many glasses of juice do you drink in a typical day? 0   How many of glasses of milk do you drink in a " "typical day? 0   If you do drink milk, what type? N/A   How many 8oz glasses of sugar containing drinks such as Yasmany-Aid/sweet tea do you drink in a day? 0   How many cans/bottles of sugar pop/soda/tea/sports drinks do you drink in a day? 0   How many cans/bottles of diet pop/soda/tea or sports drink do you drink in a day? 1   How often do you have a drink of alcohol? 4 or More Times a Week   If you do drink, how many drinks might you have in a day? 1 or 2     Breakfast- 2 eggs   Lunch- turkey on a salad   Dinner- protein and veggies with olive oil     Not generally hungry but does have some episodes where she gets nauseated and blood sugar drops. Will eat something to luevano off that feeling- granola bar, chicken, eggs something quick.     With GI side effects from medications a couple months ago was eating more carbs because they settled    Saw GI 2018, colonoscopy in 2018 and 4/2021. Bloating and indigestion after dinner usually , sometimes diarrhea in the morning again. Not specifically     TSH \"feels better\" at 1     Has tried victoza- decreased appetite and slowed absorption of food. Has been off of it because she hasn't followed up with the endocrine who had prescribed it.     Eating Habits 5/4/2021   Generally, my meals include foods like these: bread, pasta, rice, potatoes, corn, crackers, sweet dessert, pop, or juice. Never   Generally, my meals include foods like these: fried meats, brats, burgers, french fries, pizza, cheese, chips, or ice cream. Once a Week   Eat fast food (like [employer]onal[employer], [employer], [employer]aco Bell). Never   Eat at a buffet or sit-down restaurant. Never   Eat most of my meals in front of the TV or computer. A Few Times a Week   Often skip meals, eat at random times, have no regular eating times. Almost Everyday   Rarely sit down for a meal but snack or graze throughout.  Less Than Weekly   Eat extra snacks between meals. Less Than Weekly   Eat most of my food at the end of the day. Less Than " Weekly   Eat in the middle of the night or wake up at night to eat. Once a Week   Eat extra snacks to prevent or correct low blood sugar. A Few Times a Week   Eat to prevent acid reflux or stomach pain. A Few Times a Week   Worry about not having enough food to eat. Never   Have you been to the food shelf at least a few times this year? No   I eat when I am depressed. Never   I eat when I am stressed. Once a Week   I eat when I am bored. Never   I eat when I am anxious. Less Than Weekly   I eat when I am happy or as a reward. Never   I feel hungry all the time even if I just have eaten. Never   Feeling full is important to me. Less Than Weekly   I finish all the food on my plate even if I am already full. Never   I can't resist eating delicious food or walk past the good food/smell. Less Than Weekly   I eat/snack without noticing that I am eating. Never   I eat when I am preparing the meal. Less Than Weekly   I eat more than usual when I see others eating. Never   I have trouble not eating sweets, ice cream, cookies, or chips if they are around the house. Never   I think about food all day. Never   What foods, if any, do you crave? None     Eliminated gluten 10 years ago  Sugar intake very low   Not a lot of dairy- inflammation   Cut out diet soda   Doesn't like water- feels like she doesn't get enough     Amount of Food 5/4/2021   I make myself vomit what I have eaten or use laxatives to get rid of food. Never   I eat a large amount of food, like a loaf of bread, a box of cookies, a pint/quart of ice cream, all at once. Never   I eat a large amount of food even when I am not hungry. Never   I eat rapidly. Never   I eat alone because I feel embarrassed and do not want others to see how much I have eaten. Never   I eat until I am uncomfortably full. Never   I feel bad, disgusted, or guilty after I overeat. Monthly   I make myself vomit what I have eaten or use laxatives to get rid of food. Never        Activity/Exercise History 5/4/2021   How much of a typical 12 hour day do you spend sitting? Half the Day   How much of a typical 12 hour day do you spend lying down? Less Than Half the Day   How much of a typical day do you spend walking/standing? Less Than Half the Day   How many hours (not including work) do you spend on the TV/Video Games/Computer/Tablet/Phone? 2-3 Hours   How many times a week are you active for the purpose of exercise? 2-3 Times a Week   What keeps you from being more active? Pain   How many total minutes do you spend doing some activity for the purpose of exercising when you exercise? More Than 30 Minutes       PAST MEDICAL HISTORY:  Past Medical History:   Diagnosis Date     Hypertension      Hypothyroid      Iliotibial band syndrome affecting left lower leg 11/23/2018     Palpitations      Pneumonia      Shortness of breath        Work/Social History Reviewed With Patient 5/4/2021   My employment status is: Stay at Home Parent   My job is: stay at home parent   How many hours do you spend commuting to work daily?  0   What is your marital status? /In a Relationship   If in a relationship, is your significant other overweight? No   Do you have children? Yes   If you have children, are they overweight? Yes   Who do you live with?   and son   Are they supportive of your health goals? Yes   Who does the food shopping?  me       Mental Health History Reviewed With Patient 5/4/2021   Have you ever been physically or sexually abused? No   If yes, do you feel that the abuse is affecting your weight? N/A   If yes, would you like to talk to a counselor about the abuse? N/A   How often in the past 2 weeks have you felt little interest or pleasure in doing things? For Several Days   Over the past 2 weeks how often have you felt down, depressed, or hopeless? Not at all     Lots of stress-  has personality disorder   Works with therapist       Sleep History Reviewed With  Patient 5/4/2021   How many hours do you sleep at night? 7   Do you think that you snore loudly or has anybody ever heard you snore loudly (louder than talking or so loud it can be heard behind a shut door)? No   Has anyone seen or heard you stop breathing during your sleep? No   Do you often feel tired, fatigued, or sleepy during the day? No   Do you have a TV/Computer in your bedroom? Yes       MEDICATIONS:   Current Outpatient Medications   Medication Sig Dispense Refill     acetaminophen (TYLENOL) 500 MG tablet Take 1,000 mg by mouth       adalimumab (HUMIRA *CF*) 40 MG/0.4ML pen kit Inject 0.4 mLs (40 mg) Subcutaneous every 14 days 1 each 6     atenolol (TENORMIN) 25 MG tablet Take 1 tablet (25 mg) by mouth daily 90 tablet 3     azelastine (ASTELIN) 0.1 % nasal spray Spray 2 sprays into both nostrils 2 times daily 1 Bottle 11     estrogen conj-synthetic B (ENJUVIA) 0.625 MG tablet Take 1 tablet (0.625 mg) by mouth daily IMPLANTED ESTROGEN AND TESTOSTERONE PELLET, LEFT HIP 1 tablet 0     losartan (COZAAR) 50 MG tablet Take one daily 90 tablet 1     progesterone (PROMETRIUM) 100 MG capsule Take 1 capsule (100 mg) by mouth daily 90 capsule 3     Semaglutide,0.25 or 0.5MG/DOS, 2 MG/1.5ML SOPN Inject 0.25 mg subcutaneously once weekly for 4 weeks then 0.5 mg once weekly. 1.5 mL 1     Vitamin D3 (CHOLECALCIFEROL) 25 mcg (1000 units) tablet Take 1 tablet (25 mcg) by mouth daily 90 tablet 3       ALLERGIES:   Allergies   Allergen Reactions     Hydrocortisone      Molds & Smuts      No Clinical Screening - See Comments Other (See Comments)     Otezla [Apremilast] GI Disturbance     Vomiting      Seasonal Allergies        Office Visit on 05/04/2021   Component Date Value Ref Range Status     Hemoglobin A1C 05/04/2021 5.0  4.1 - 5.7 % Final     FASTING SPECIMEN 05/04/2021 YES   Final     Cholesterol 05/04/2021 224.0* 0.0 - 200.0 Final     HDL Cholesterol 05/04/2021 44.0* >50.0 Final     Triglycerides 05/04/2021 244.0* 0.0  "- 150.0 Final     Cholesterol/HDL Ratio 05/04/2021 5.1* 0.0 - 5.0 Final     LDL Cholesterol Direct 05/04/2021 131.0* 0.0 - 129.0 Final     VLDL-Cholesterol 05/04/2021 49.0* 7.0 - 32.0 Final     Vitamin D Deficiency screening 05/04/2021 85* 20 - 75 ug/L Final    Comment: Season, race, dietary intake, and treatment affect the concentration of   25-hydroxy-Vitamin D. Values may decrease during winter months and increase   during summer months. Values 20-29 ug/L may indicate Vitamin D insufficiency   and values <20 ug/L may indicate Vitamin D deficiency.  Vitamin D determination is routinely performed by an immunoassay specific for   25 hydroxyvitamin D3.  If an individual is on vitamin D2 (ergocalciferol)   supplementation, please specify 25 OH vitamin D2 and D3 level determination by   LCMSMS test VITD23.           PHYSICAL EXAM:  Objective    Ht 1.651 m (5' 5\")   Wt 81.6 kg (180 lb)   BMI 29.95 kg/m    Vitals - Patient Reported  Pain Score: No Pain (0)      Vitals:  No vitals were obtained today due to virtual visit.    Physical Exam   GENERAL: Healthy, alert and no distress  EYES: Eyes grossly normal to inspection.  No discharge or erythema, or obvious scleral/conjunctival abnormalities.  RESP: No audible wheeze, cough, or visible cyanosis.  No visible retractions or increased work of breathing.    SKIN: Visible skin clear. No significant rash, abnormal pigmentation or lesions.  NEURO: Cranial nerves grossly intact.  Mentation and speech appropriate for age.  PSYCH: Mentation appears normal, affect normal/bright, judgement and insight intact, normal speech and appearance well-groomed.        Sincerely,    Elizabeth Dinh NP      "

## 2021-05-07 NOTE — NURSING NOTE
"Chief Complaint   Patient presents with     Consult     Consultation Weight Management       Vitals:    05/07/21 0709   Weight: 81.6 kg (180 lb)   Height: 1.651 m (5' 5\")       Body mass index is 29.95 kg/m .                        "

## 2021-05-07 NOTE — ASSESSMENT & PLAN NOTE
Chronic, takes tums occasionally. Feels it is related to food intake but difficult to pinpoint foods.

## 2021-05-07 NOTE — PROGRESS NOTES
juan m is a 56 year old who is being evaluated via a billable video visit.      How would you like to obtain your AVS? [place]  If the video visit is dropped, the invitation should be resent by: Text to cell phone: [phone]  Will anyone else be joining your video visit? No      Video Start Time: 0730  Video-Visit Details    Type of service:  Video Visit    Video End Time:0816    Originating Location (pt. Location): Home    Distant Location (provider location):  [facility] [place]     Platform used for Video Visit: [employer]

## 2021-05-07 NOTE — TELEPHONE ENCOUNTER
DAYANM with call center number. Pt to follow up with Dr. Sun in 4 weeks (around 6/4/21) per Elizabeth Dinh. Sent [employer].

## 2021-05-07 NOTE — ASSESSMENT & PLAN NOTE
Previously managed at private pay OBGYN. Used compounded thyroid natural products. Feels best when TSH is around 1. Not recently checked.

## 2021-05-07 NOTE — ASSESSMENT & PLAN NOTE
Because of GI symptoms she has eliminated many foods from diet. She has not had gluten in 10 years. Minimal lactose, if any. Diet is very bland- chicken, lettuce, eggs. Has had more CHO in the last months- feeling overwhelmed with the work up for her psoriasis and the pain she was experiencing. Numerous elimination diets over the years.

## 2021-05-07 NOTE — ASSESSMENT & PLAN NOTE
GI symptoms wax and wane over time. Chronic issues (10+ years) has seen GI in 2018 and recommended FODMAP diet after colonoscopy. She saw little benefit from the dietary changes and has since eliminated many different foods over time to determine trigger but unable to pinpoint issue.     GI symptoms dramatically impact her eating patterns and what she eats. She will sometimes feel like she needs to skip dinner because that is when she feels the worst.     Most recent colonoscopy was in 4/2021. Had complete resolution of symptoms after colonoscopy for about a month. She describes this as having normal bowel movements- not hard, not diarrhea. Less bloating and indigestion. Gradual return of these symptoms over time.     New diagnosis of psoriasis and possible autoimmune diagnosis. Started Humira x 1 dose. Has not gotten results of biopsy from colonoscopy.     I question an IBS diagnosis and would like her to see GI to evaluate further. She agrees.

## 2021-05-07 NOTE — PATIENT INSTRUCTIONS
Saman Monreal,    Follow-up with RD monthly or as needed    Thank you,    Lala Forte, RD, LD  If you would like to schedule or reschedule an appointment with the RD, please call [phone]    Nutrition Goals  1) Eat 3 meals per day  2) One snack per day  3) Keep a food journal   4) Increase water intake, aim for 48+ oz per day  5) Decrease caffine intake. Consider mixing regular coffee with 50% decaf.   6) Eliminate carbonated beverages  7) Start a daily complete multivitamin with minerals (chewable for better tolerance)  8) Increase activity as able.     The Plate Method  [email or web address]    Protein Sources for Weight Loss  [email or web address]     Carbohydrates  [email or web address]     Mindful Eating  [email or web address]     Summary of Volumetrics Eating Plan  [email or web address]       Interested in working with a health ?  Health coaches work with you to improve your overall health and wellbeing.  They look at the whole person, and may involve discussion of different areas of life, including, but not limited to the four pillars of health (sleep, exercise, nutrition, and stress management). Discuss with your care team if you would like to start working a health .    Health Coaching-3 Pack:    $99 for three health coaching visits    Visits may be done in person or via phone    Coaching is a partnership between the  and the client; Coaches do not prescribe or diagnose    Coaching helps inspire the client to reach his/her personal goals      Virtual Support Groups are Available             Healthy Lifestyle Support Group      All are welcome!     Facilitator: Hawa Koenig, Certified Health     - Meets once a month on a Friday from 12:30pm to 1:30pm.  - Due to Covid-19 she is doing this Support Group virtually using Microsoft Teams.  - 60 minutes of small group guided discussion, support and resources.  - Please email Hawa directly at  [email or web address] to receive monthly invitations.  - If you opt to participate in individual health coaching sessions or for general questions, contact Hawa via email.  - Hawa will send out invites for each session, so the phone number and the conference ID may change for each session.     2020 Meetings      December 18 - Open Forum      2021 Meetings      January 29 - How to Stay Active and Healthy during the Winter Months   February 26  - Reading Food Labels: What do I Need to Know?, Guest Speaker: Lala Forte RD   March 19  - Finding Health, Happiness and Confidence at Every Size; Guest Speaker, Health Psychologist Fellow  April 30 -  Healthy Eating on a Budget, Guest Speaker: Dinorah Machado RD   May 21 - Open Forum

## 2021-05-07 NOTE — PATIENT INSTRUCTIONS
"It was a pleasure meeting with you today.    Thank you for allowing us the privilege of caring for you. We hope we provided you with the excellent service you deserve.   Please let us know if there is anything else we can do for you so that we can be sure you are leaving completely satisfied with your care experience.    To ensure the quality of our services you may be receiving a patient satisfaction survey from an independent patient satisfaction monitoring company.    The greatest compliment you can give is a \"Likely to Recommend\"      Your visit was with Elizabeth Dinh NP today.     Instructions per today's visit:     MEDICATIONS:        - Continue other medications without change       - will send ozempic in to see if covered, continue victoza until then   CONSULTATION/REFERRAL to GI   FUTURE APPOINTMENTS:       - Follow-up visit in 1 month with Dr. Sun        - Continue with RD       To schedule appointments with our team, please call [phone] option #1    Please call during clinic hours Monday through Friday 8:00a - 4:00p if you have questions or you can contact us via [employer] at anytime.      Nurses: [phone]  Fax: [phone]   _____________________________________________________________________________________________________________________________    Meal Replacement Products:    Here is the link to our new e-store where you can purchase our meal replacement products    [facility] [email or web address]    The one week starter kit is a great way to sample a variety of products and see what works for you.    If you want more information about the product go to: [employer]    Free Shipping for orders over $75     Benefits of meal replacements products:    Portion and calorie control  Improved nutrition  Structured eating  Simplified food choices  Avoid contact with trigger " foods  ______________________________________________________________________________________________________________________________    Healthy Lifestyle Support Group   [facility] Weight Management Program  Virtual Support Group Now Available    60 minutes of small group guided discussion, support and resources    Led by Health , Hawa Koenig    For questions, and to receive the invite information, contact Hawa at [email or web address]    All our welcome!    One Friday per month, 12:30pm to 1:30pm  SELF COMPASSION: July 31st  CREATING MY WELLNESS VISION: August 28th  MINDFULNESS PRACTICES FOR A CALM BODY & MIND: September 25th  MINDFUL EATING TOOLS: October 30th  HEALTHY& HAPPY HOLIDAYS: November 20th  OPEN FORUM: December 18th  _______________________________________________________________________________________________________________________________    Connections: Bariatric Care support group  Due to the Covid-19 restrictions, we will be doing our support group virtually using Microsoft Teams. You will need to get an invitation to the group from Marcel Brink, Ph.D., LP at [email or web address] and to learn about using Microsoft Teams. The next meeting is on Tuesday, July 14 between 6:30 and 8 PM and there will be no formal speaker.    This group meets the second Tuesday of each month from 6:30 to 8 PM and will be held again at [facility] in the  [facility] (A-B room) when the Covid-19 restrictions are lifted. The group is led by Marcel Brink, Ph.D., Licensed Psychologist, [facility] Comprehensive Weight Management Program. There is no cost for group participation and is open to all [facility] (and those external to this program) pre- and post-operative bariatric surgery patients as well as their support system.    _________________________________________________________________________________________________________________________________      Interested in working with a health ?  Health coaches work with you to improve your overall health and wellbeing.  They look at the whole person, and may involve discussion of different areas of life, including, but not limited to the four pillars of health (sleep, exercise, nutrition, and stress management). Discuss with your care team if you would like to start working a health .     Health Coaching-3 Pack:      $99 for three health coaching visits    Visits may be done in person or via phone    Coaching is a partnership between the  and the client; Coaches do not prescribe or diagnose    Coaching helps inspire the client to reach his/her personal goals   __________________________________________________________________________________________________________________________________    [facility] Get Moving Program    Our team of physical therapists is trained to help you understand and take control of your condition. They will perform a thorough evaluation to determine your ability for activity and develop a customized plan to fit your goals and physical ability.  Scheduling: Unsure if the Get Moving program is right for you? Discuss the program with your medical provider or diabetes educator. You can also call us at [phone] to ask questions or schedule an appointment.   [facility] Get Moving Program  ______________________________________________________________________________________________________________________  Bluetooth Scale:    We hope to provide you with high quality telephone and virtual healthcare visits while social distancing for COVID-19 is necessary, as well as in the future when virtual visits may be more convenient for you.     Our technology team made it possible for [employer] scales to send weight measurements to our  electronic medical record. This allows weights from you weighing at home to securely flow into the medical record, which will improve telephone and virtual visits.   Additionally, studies have shown that adults actually lose more weight when their weights are automatically sent to someone else, and also that this process is not stressful for those adults.    Below is a link for purchasing the scale, with a discount code for our patients. You may call your insurance company to see if they will reimburse you for the cost of the scale, as a piece of durable medical equipment. The scales only go up to a weight of 400 pounds. This is an issue and we are working with the developer on increasing this. We found no scales that go over 400lb that have blue-tooth for connecting to discoapi.    Scale to purchase: the Medivo  Body  Scale: [email or web address].ds    Discount Code: We have a discount code for our patients to bring the cost down to $50, the code is: [place]_Scale_20%off    Steps to link the scale to discoapi via an Android Phone (you can always disconnect at any point in the future):  1. The order must be placed first before the patient can access Track My Health within discoapi.  2. Download Google Fit juan manuel from the Google Play Store   a. Log in or register using your Google account   3. Download the discoapi juan manuel from Google Play Store  a. Select add organization   b. Search for [employer] and select it   c. Log into discoapi  d. Select Track My Health   e. Select the green connect my account button   f. When prompted log into your Google account   g. Select okay to confirm the account   4. Download the [facility] Health Mate juan manuel from Google Play Store  a. [place] for Medivo   b. Go to profile   c. Tap google fit under the Apps section  d. Select the option to activate Google Fit integration   e. Select the same Google  account   f. Select okay to confirm the account  g.   Steps to link the scale to JobOn via an iPhone (you can always disconnect at any point in the future):  **Note [facility] is not available for download on an iPad**  1. The order must be placed first before the patient can access Track My Health within JobOn.  2. Locate the Health shelly on your iPhone.  a. Set up your Apple Health account as prompted  b. The Sources page will show Apps that communicate with your Health shelly. Once all steps are completed, you should see FORA.tv and HiperScant listed under the Apps section and your iPhone under the devices section.  i. Select Health Mate  1. Under 'ALLOW  HEALTH SideTour  TO WRITE DATA ensure the toggle is on for Weight.  2. This will allow the scale to add your weight to the Apple Health  ii. Select MyChart  1. Under 'ALLOW  [employer]  TO READ DATA ensure the toggle is on for Weight.  2. This allows JobOn to grab the weight from [facility] so your provider can see your weights.  3. Download the JobOn shelly from the Shelly Store   a. Select add organization                                                  b. Search for [employer] and select it  c. Log into JobOn  d. Select Track My Health   e. Select the green connect my account button   f. Follow prompts to link your device to JobOn.  4. Download the Withings health mate shelly in the Shelly Store   a. [place] for [employer]   b. Go to profile   c. [facility] under the Apps section  d. If prompted to allow access with the Health Shelly, toggle weight on for read and write access.         MEDICATION STARTED AT THIS APPOINTMENT    We are starting a GLP-1 (Glucagon-like Peptide-1) medication. Examples of this medication include Byetta, Bydureon, or Victoza, etc. The medication chosen will depend on insurance coverage, and can be changed to the medication that is covered under your plan. These medications work the same, in that they can help you lose weight and control your  blood sugar. One of the ways it works is by slowing down the rate that food leaves your stomach. You feel eldridge and will eat less.  It also helps regulate hormones that can help improve your blood sugars.    Usually short acting insulins or oral agents are stopped or decreased by the doctor at the appointment. Low blood sugars are rare but can happen if patients are on insulin or other oral agents. If you notice consistent low sugars or high sugars, your medication may need to be adjusted after your appointment. If this is the case, please call RN and provide her your blood sugar record from the last 3-4 days. The RN will get in touch with the doctor and call you back/[employer] message with recommendations. We tolerate high sugars for a bit, so if sugars are running 180-200, this is ok. As weight starts dropping the blood sugars should too. If readings are consistently over 200 for 1-2 weeks, then you should call the doctor/nurse.    Types of GLP-1 medications include:    Victoza: Starting dose is 0.6 mg for a week, then 1.2 mg for a week, and then 1.8 mg thereafter (if prescribed by doctor). This medication is given daily. Pen needles need to be ordered also.    Ozempic: Starting dose is 0.25 mg once weekly for 4 weeks, and then increase to 0.5 mg weekly. If after 4 weeks of being on 0.5 mg weekly dosing and still wanting additional weight loss and/or blood sugar benefits, check with your doctor to see if they want to increase the dose to 1 mg weekly. Pen needles come in the Ozempic pen box.    Trulicity: Starting dose is 0.75 mg once weekly.  If after 1-3 months of being on 0.75 mg weekly and still wanting additional weight loss and/or blood sugar benefits, check with your doctor to see if they want to increase the dose to 1.5 mg weekly.    Bydureon: Starting dose is 2 mg and is given weekly. The patient should pick one day a week and give the medication on that day. Pen needles need to be ordered also.    Byetta:  Starting dose is 5 mcg twice daily. This is given for the first month. Check with the doctor after a month to see if they want the dose increased to 10 mcg BID. Pen needles need to be ordered also.     Side effects of GLP- Medications include: The most common side effects are all GI related and consist of: nausea, constipation, diarrhea, burping, or gassiness. Patients are advised to eat slowly and less, and nausea typically passes if people can stick it out.     The risk of pancreatitis (inflammation of the pancreas) has been associated with this type of medication, but is very rare.  If you have had pancreatitis in the past, this medication may not be for you. Please let us know about any past history of pancreas problems.      Symptoms of pancreatitis include: Pain in your upper stomach area which  may travel to your back and be worse after eating. Your stomach area may be tender to the touch.  You may have vomiting or nausea and/or have a fever. If you should develop any of these symptoms, stop the medication and contact your primary care doctor. They will do a blood test to check for pancreatitis.         There is a small chance you may have some low blood sugar after taking the medication.   The signs of low blood sugar are:  o Weakness  o Shaky   o Hungry  o Sweating  o Confusion      See below for ways to treat low blood sugar without adding in lots of extra calories.      Treating Low Blood Sugar    If you have symptoms of low blood sugar (sweating, shaking, dizzy, confused) eat 15 grams of carbs and wait 15 minutes:      Glucose Tabs are best for sugars under 70 -  Dex4 or BD Glucose tablets are good, you will need to take 3-4 of these to equal 15 grams.       One small box of raisins    4 oz fruit juice box or   cup fruit juice    1 small apple    1 small banana      cup canned fruit in water      English muffin or a slice of bread with jelly     1 low fat frozen waffle with sugar-free syrup      cup  cottage cheese with   cup frozen or fresh blueberries    1 cup skim or low-fat milk      cup whole grain cereal    4-6 crackers such as Triscuits      This medication is usually covered by insurance for patients with a diagnosis of Type 2 Diabetes. Depending on insurance coverage, the medication may be changed to a different formulary, but they all work in the same way. Sometimes a prior authorization is required, which may take up to 1-2 weeks for an insurance company to make a decision if they will cover the medication. Please be patient, you will be notified either way.     Contact the nurse via Telepo or call [phone] if you have any questions or concerns. (Do not stop taking it if you don't think it's working. For some people it works without them knowing it.)     In order to get refills of this or any medication we prescribe you must be seen in the medical weight mgmt clinic every 2-4 months.

## 2021-05-07 NOTE — LETTER
"5/7/2021       RE: Jocelin Hernandez  [address] [address]     Dear Colleague,    Thank you for referring your patient, Jocelin Hernandez, to the [facility]NA[facility] [place] at [facility]. Please see a copy of my visit note below.    Jocelin Hernandez is a 56 year old female who is being evaluated via a billable video visit.      The patient has been notified of following:     \"This video visit will be conducted via a call between you and your physician/provider. We have found that certain health care needs can be provided without the need for an in-person physical exam.  This service lets us provide the care you need with a video conversation.  If a prescription is necessary we can send it directly to your pharmacy.  If lab work is needed we can place an order for that and you can then stop by our lab to have the test done at a later time.    Video visits are billed at different rates depending on your insurance coverage.  Please reach out to your insurance provider with any questions.    If during the course of the call the physician/provider feels a video visit is not appropriate, you will not be charged for this service.\"    Patient has given verbal consent for Video visit? Yes  How would you like to obtain your AVS? MyChart  Will anyone else be joining your video visit? No        Video-Visit Details    Type of service:  Video Visit    Video Start Time:  10:27 AM  Video End Time: 11:03 AM    Originating Location (pt. Location): Home    Distant Location (provider location):  [facility]NA[facility] [place]     Platform used for Video Visit: Onaro    During this virtual visit the patient is located in [place], patient verifies this as the location during the entirety of this visit.     New Weight Management Nutrition Consultation    Jocelin Hernandez is a 56 year old female presents today for new " "weight management nutrition consultation.  Patient referred by Elizabeth Dinh NP on May 7, 2021.    Patient with Co-morbidities of obesity including:  Type II DM no  Renal Failure no  Sleep apnea no  Hypertension yes   Dyslipidemia yes  Joint pain yes  Back pain yes  GERD no     Anthropometrics:  Estimated body mass index is 30.04 kg/m  as calculated from the following:    Height as of 5/4/21: 1.651 m (5' 5\").    Weight as of 5/4/21: 81.9 kg (180 lb 8 oz).    Medications for Weight Loss:  Victoza    Vitamin and mineral supplements:  Calcium     NUTRITION HISTORY  See MD note for details.    Per questionnaire: new psoriasis (skin and joints)diagnosis, nerve impingement at L4/5, did AIP [autoimmune protocol diet] & timed eating at beginning of year and then altered that because of GI side effects from [place].    Per discussion with pt today: Follows a gluten-free, corn-free and diary-free diet, sometimes small amount milk or cheese. Current severe inflammation, trying to figure out if triggered by food or something else.   Has been told she has leaky gut. Tried AIP protocol for a month (eliminated corn after this), tried low-FODMAP diet (followed for about a month).  Last night had lentil pasta (1.5 cup), felt very bloated after having.    Previous experiences with wt loss: Has done Atkins, Keto diet in the past with success. However notes very challenging to stay in ketosis (would come out above ingestions of 20 gm carb/day). Has counted calories and done WW point system in the past, following 1200 calories/per day. Pt not sure if this was more helpful vs harmful to MH.     Cut-out red meat at the beginning of the year. Also since the beginning of the year, focusing on lean protein (less eggs d/t inflammation), low carb and large portions of well tolerated non-starchy veggies. Pt looking for new breakfast ideas, more hesitant about changing lunch and dinner meals b/c she knows she tolerates these foods well, however " does note she is likely missing out on some essential nutrients by eating the same things. Pt also wondering what changes she can make to diet to help improve lipid panel.     Recent food recall:  If you do eat breakfast, what types of food do you eat? eggs, left over protein; had done oatmeal in the past   Do you typically eat lunch? Yes   If you do eat lunch, what types of food do you typically eat?  Deli Turkey with shredded lettuce   Do you typically eat supper? Yes   If you do eat supper, what types of food do you typically eat? protein (mostly chicken), veggies; lettuce and chicken breast on the side.    Do you typically eat snacks? Yes   If you do snack, what types of food do you typically eat? nuts, turkey slice, rarely cheese     Beverages: Water (not enough), coffee in the am, diet coke     Physical Activity:  Pilates - twice per week. Increased intensity/frequency of exercise limited by back and knee pain.     Nutrition Prescription  Recommended energy/nutrient modification.  1000 calories/day     Nutrition Diagnosis  Obesity r/t long history of self-monitoring deficit and excessive energy intake aeb BMI >30.    Nutrition Intervention  Materials/education provided on 1000 calorie/day diet, Volumetric eating to help satiety level on fewer calories; portion control and healthy food choices, 100 calorie snack choices, meal and snack planning and MNT for dyslipidemia.   Encouraged pt to keep a food journal to help with mindfulness and accountability. If pt chooses to count calories, discussed following 1000 calories/day.   Encouraged pt to work on reducing carbonation and caffeine. Discussed beverage alternatives.  Discussed meal ideas for breakfast.   Encouraged increased exercise as able.   Advised pt to start a complete multivitamin with minerals.   Patient demonstrates understanding.    Expected Engagement: good  Follow-Up Plans: Food log, meal planning     Nutrition Goals  1) Eat 3 meals per day  2) One  snack per day  3) Keep a food journal   4) Increase water intake, aim for 48+ oz per day  5) Decrease caffine intake. Consider mixing regular coffee with 50% decaf.   6) Eliminate carbonated beverages  7) Start a daily complete multivitamin with minerals (chewable for better tolerance)  8) Increase activity as able.     The Plate Method  [email or web address]    Protein Sources for Weight Loss  [email or web address]     Carbohydrates  [email or web address]     Mindful Eating  [email or web address]     Summary of Volumetrics Eating Plan  [email or web address]     Follow-Up:  Monthly or PRN    Time spent with patient: 36 minutes.  Lala Forte RD, LD

## 2021-05-07 NOTE — TELEPHONE ENCOUNTER
"Prior Authorization Retail Medication Request    Medication/Dose: Ozempic  ICD code (if different than what is on RX):    Obesity, Class I, BMI 30-34.9 [E66.9]  - Primary       Metabolic syndrome [E88.81]           Previously Tried and Failed:  Watching Portions or Calories, Exercise, Atkins-type Diet (Low Carb/High Protein), Meal Replacements, Fasting  Rationale:  Jocelin Hernandez is a 56 year old female interested in treatment of medical problems associated with excess weight. She has a height of 5' 5\", a weight of 180 lbs 0 oz, and the calculated Body mass index is 29.95 kg/m  and the following co-morbididties: High Blood Pressure, High Cholesterol, Osteoarthritis (joint disease), Hypothyroidism. Has tried victoza- decreased appetite and slowed absorption of food. Has been off of it because she hasn't followed up with the endocrine who had prescribed it.    Insurance Name:    Insurance ID:        Pharmacy Information (if different than what is on RX)  Name:  [id number] [place], [place] - [facility] [address] AT [address]  Phone:  [phone]  "

## 2021-05-11 NOTE — TELEPHONE ENCOUNTER
Central Prior Authorization Team   Phone: [phone]      PA Initiation    Medication: Ozempic-PA initiated  Insurance Company: [employer] ([facility]) - Phone [phone] Fax [phone]  Pharmacy Filling the Rx: [id number] [place], [place] - [address]  Filling Pharmacy Phone: [phone]  Filling Pharmacy Fax:    Start Date: 5/11/2021

## 2021-05-12 NOTE — TELEPHONE ENCOUNTER
PRIOR AUTHORIZATION DENIED    Medication: Ozempic-PA denied    Denial Date: 5/12/2021    Denial Rational:       Appeal Information:

## 2021-05-17 ENCOUNTER — VIRTUAL VISIT (OUTPATIENT)
Dept: ENDOCRINOLOGY | Facility: CLINIC | Age: 56
End: 2021-05-17
Payer: COMMERCIAL

## 2021-05-17 ENCOUNTER — TRANSFERRED RECORDS (OUTPATIENT)
Dept: HEALTH INFORMATION MANAGEMENT | Facility: CLINIC | Age: 56
End: 2021-05-17

## 2021-05-17 ENCOUNTER — TELEPHONE (OUTPATIENT)
Dept: ENDOCRINOLOGY | Facility: CLINIC | Age: 56
End: 2021-05-17

## 2021-05-17 VITALS — HEIGHT: 65 IN | BODY MASS INDEX: 29.99 KG/M2 | WEIGHT: 180 LBS

## 2021-05-17 DIAGNOSIS — E03.9 ACQUIRED HYPOTHYROIDISM: ICD-10-CM

## 2021-05-17 DIAGNOSIS — E66.811 OBESITY, CLASS I, BMI 30-34.9: Primary | ICD-10-CM

## 2021-05-17 PROCEDURE — 99204 OFFICE O/P NEW MOD 45 MIN: CPT | Mod: GT | Performed by: INTERNAL MEDICINE

## 2021-05-17 RX ORDER — SEMAGLUTIDE 1.34 MG/ML
1 INJECTION, SOLUTION SUBCUTANEOUS
Qty: 12 ML | Refills: 3 | Status: SHIPPED | OUTPATIENT
Start: 2021-05-17 | End: 2021-07-12

## 2021-05-17 ASSESSMENT — MIFFLIN-ST. JEOR: SCORE: 1407.35

## 2021-05-17 ASSESSMENT — PAIN SCALES - GENERAL: PAINLEVEL: EXTREME PAIN (8)

## 2021-05-17 NOTE — PROGRESS NOTES
juan m is a 56 year old who is being evaluated via a billable video visit.      How would you like to obtain your AVS? iRatesharRepair Report  If the video visit is dropped, the invitation should be resent by: Text to cell phone: [phone]  Will anyone else be joining your video visit? No      Video Start Time: 10:10  Video-Visit Details    Type of service:  Video Visit    Video End Time:10:45    Originating Location (pt. Location): Home    Distant Location (provider location):  [facility]NAGEMENT [facility] [place]     Platform used for Video Visit: Redfin

## 2021-05-17 NOTE — TELEPHONE ENCOUNTER
"Prior Authorization Retail Medication Request    Medication/Dose: Ozempic 1mg  ICD code (if different than what is on RX):  Obesity, Class I, BMI 30-34.9 [E66.9]  - Primary     Previously Tried and Failed: Watching Portions or Calories, Exercise, Atkins-type Diet (Low Carb/High Protein), Meal Replacements, Fasting  Rationale:  Jocelin Hernandez is a 56 year old female interested in treatment of medical problems associated with excess weight. She has a height of 5' 5\", a weight of 180 lbs 0 oz, and the calculated Body mass index is 29.95 kg/m  and the following co-morbididties: High Blood Pressure, High Cholesterol, Osteoarthritis (joint disease), Hypothyroidism. Has tried victoza- decreased appetite and slowed absorption of food. Has been off of it because she hasn't followed up with the endocrine who had prescribed it. She has Gained 8 lbs since beginning of 2021. Due to new diagnosis of psoriasis and arthritis.   Gained 25-30 lbs in the past 3 years. Was started on victoza for weight loss (this year). Tried it for 2 months and she lost only 3 lbs.Also hormones for weight loss. (estrogen, progesterone, testosterone).   Has HTN. A1c 5.0.   Ozempic was suggested as an alternative. Needed pre-approval.     Insurance Name:    Insurance ID:        Pharmacy Information (if different than what is on RX)  Name:  [id number] [place], [place] - [address]  Phone:  [phone]  "

## 2021-05-17 NOTE — NURSING NOTE
"Chief Complaint   Patient presents with     RECHECK     Follow up [facility]wm.       Vitals:    05/17/21 0956   Weight: 81.6 kg (180 lb)   Height: 1.651 m (5' 5\")       Body mass index is 29.95 kg/m .                            DARLEEN TAPIA, EMT    "

## 2021-05-17 NOTE — PROGRESS NOTES
Return Medical Weight Management Note     Jocelin Hernandez  MRN:  [id number]  :  1965  TENA:  21     I had the pleasure of evaluating Jocelin Hernandez. She is a 56 year old female who I am continuing to manage for treatment of obesity related to:       2021   I have the following health issues associated with obesity: High Blood Pressure, High Cholesterol, Osteoarthritis (joint disease), Hypothyroidism   I have the following symptoms associated with obesity: Knee Pain, Back Pain, Fatigue, Hip Pain       Assessment & Plan   Problem List Items Addressed This Visit     Obesity, Class I, BMI 30-34.9 - Primary      Other Visit Diagnoses     Acquired hypothyroidism        Relevant Orders    TSH with free T4 reflex         Her weight has been an issue in the past few months likely related to stress and new diagnosis of psoriasis/arthritis.     Plan:   - check TSH and restart NP thyroid. Would avoid switching to other thyroid hormone formulation sat this point to minimize new variables.   - would not advise continuing hormone pellets since they have not helped her with weight loss.   - switch from Victoza to Ozempic   - diet: start tracking and aim for <1000 Kcal/day. Try intermittent fasting few times a week.     45 minutes spent on the date of the encounter doing chart review, history and exam, documentation and further activities per the note  0956}  FUTURE APPOINTMENTS:       - Follow-up office or E-visit in 2 months    Jerrell Sun MD  Endocrinology, Diabetes and Metabolism  [facility]    -------------------------------------------------------------------------------------------------------------    INTERVAL HISTORY:  She is new to me today. She saw Elizabeth Dinh few weeks ago. Briefly, she has Gained 8 lbs since beginning of . Due to new diagnosis of psoriasis and arthritis.   Gained 25-30 lbs in the past 3 years.   Current weight is almost at its highest.   Body mass index is 29.95  kg/m .    Was started on victoza for weight loss (this year). Tried it for 2 months and she lost only 3 lbs.Also hormones for weight loss. (estrogen, progesterone, testosterone).   Has HTN. A1c 5.0.   Ozempic was suggested as an alternative. Needed pre-approval.     Diet: was doing intermittent fasting and was making progress. Otezla messed everything up. Ended up stopping the fasting, and snacking more.   Usually:   - breakfast: egg or granola bar  - lunch: salad and turkey  - dinner: chicken and vegetables    Was not tracking her diet when she was sick.   For the past week, she has been trying to aim for <1000 Kcal/day.    Still taking Victoza at 1.8 mg daily. It helps with inflammation. It also has an appetite reducing effect.     When she has tracked, she has used an juan manuel in the past, and also paper.     Activity: no routine    Hypothyroidism for about 25 years. Has been taking NP thyroid for a while. TSH of 1 which is where she feels good. Unsure of the dose.     Has been taking Pellets of estrogen and testosterone. Compounded progesterone.      Otezla was making her vomit. Stopped NP thyroid because of that. Has been off for 2 months. No symptoms of hypothyroidism.   Now off of Otezla and on Humira.   Did not take prednisone.     Has bilateral knee pain right now.     CURRENT WEIGHT:   180 lbs 0 oz    Weight Tracking:   Weight at initial [facility] Visit: 5/7/2021: 180 lbs  Weight change since last seen on 5/7/2021: no change  Weight change since initial [facility] visit: 5/7/21: no change      Initial Weight (lbs): 180 lbs  Last Visits Weight: 81.6 kg (180 lb)  Cumulative weight loss (lbs): 0  Weight Loss Percentage: 0%    Changes and Difficulties 5/17/2021   I have made the following changes to my diet since my last visit: none   With regards to my diet, I am still struggling with: seeing results   I have made the following changes to my activity/exercise since my last visit: none   With regards to my activity/exercise, I  "am still struggling with: pain       VITALS:  Ht 1.651 m (5' 5\")   Wt 81.6 kg (180 lb)   BMI 29.95 kg/m      MEDICATIONS:   Current Outpatient Medications   Medication Sig Dispense Refill     acetaminophen (TYLENOL) 500 MG tablet Take 1,000 mg by mouth       adalimumab (HUMIRA *CF*) 40 MG/0.4ML pen kit Inject 0.4 mLs (40 mg) Subcutaneous every 14 days 1 each 6     atenolol (TENORMIN) 25 MG tablet Take 1 tablet (25 mg) by mouth daily 90 tablet 3     azelastine (ASTELIN) 0.1 % nasal spray Spray 2 sprays into both nostrils 2 times daily 1 Bottle 11     estrogen conj-synthetic B (ENJUVIA) 0.625 MG tablet Take 1 tablet (0.625 mg) by mouth daily IMPLANTED ESTROGEN AND TESTOSTERONE PELLET, LEFT HIP 1 tablet 0     losartan (COZAAR) 50 MG tablet Take one daily 90 tablet 1     progesterone (PROMETRIUM) 100 MG capsule Take 1 capsule (100 mg) by mouth daily 90 capsule 3     Semaglutide,0.25 or 0.5MG/DOS, 2 MG/1.5ML SOPN Inject 0.25 mg subcutaneously once weekly for 4 weeks then 0.5 mg once weekly. 1.5 mL 1     Vitamin D3 (CHOLECALCIFEROL) 25 mcg (1000 units) tablet Take 1 tablet (25 mcg) by mouth daily 90 tablet 3       Weight Loss Medication History Reviewed With Patient 5/17/2021   Which weight loss medications are you currently taking on a regular basis?  Victoza (liraglutide)   Are you having any side effects from the weight loss medication that we have prescribed you? Yes   If you are having side effects please describe: slow digestion leading to nausea/vomiting           PHYSICAL EXAM:  Objective    Ht 1.651 m (5' 5\")   Wt 81.6 kg (180 lb)   BMI 29.95 kg/m    Vitals - Patient Reported  Pain Score: Extreme Pain (8)  Pain Loc: Knee      Vitals:  No vitals were obtained today due to virtual visit.    Physical Exam   GENERAL: Healthy, alert and no distress  EYES: Eyes grossly normal to inspection.  No discharge or erythema, or obvious scleral/conjunctival abnormalities.  RESP: No audible wheeze, cough, or visible " cyanosis.  No visible retractions or increased work of breathing.    SKIN: Visible skin clear. No significant rash, abnormal pigmentation or lesions.  NEURO: Cranial nerves grossly intact.  Mentation and speech appropriate for age.  PSYCH: Mentation appears normal, affect normal/bright, judgement and insight intact, normal speech and appearance well-groomed.      Sincerely,    Jerrell Sun MD

## 2021-05-17 NOTE — LETTER
2021       RE: Jocelin Hernandez  [address] [address]     Dear Colleague,    Thank you for referring your patient, Jocelin Hernandez, to the [facility]. Please see a copy of my visit note below.    Return Medical Weight Management Note     Jocelin Hernandez  MRN:  [id number]  :  1965  TENA:  21     I had the pleasure of evaluating Jocelin Hernandez. She is a 56 year old female who I am continuing to manage for treatment of obesity related to:       2021   I have the following health issues associated with obesity: High Blood Pressure, High Cholesterol, Osteoarthritis (joint disease), Hypothyroidism   I have the following symptoms associated with obesity: Knee Pain, Back Pain, Fatigue, Hip Pain       Assessment & Plan   Problem List Items Addressed This Visit     Obesity, Class I, BMI 30-34.9 - Primary      Other Visit Diagnoses     Acquired hypothyroidism        Relevant Orders    TSH with free T4 reflex         Her weight has been an issue in the past few months likely related to stress and new diagnosis of psoriasis/arthritis.     Plan:   - check TSH and restart NP thyroid. Would avoid switching to other thyroid hormone formulation sat this point to minimize new variables.   - would not advise continuing hormone pellets since they have not helped her with weight loss.   - switch from Victoza to Ozempic   - diet: start tracking and aim for <1000 Kcal/day. Try intermittent fasting few times a week.     45 minutes spent on the date of the encounter doing chart review, history and exam, documentation and further activities per the note  0956}  FUTURE APPOINTMENTS:       - Follow-up office or E-visit in 2 months    Jerrell Sun MD  Endocrinology, Diabetes and Metabolism  [facility]  [place]    -------------------------------------------------------------------------------------------------------------    INTERVAL HISTORY:  She is new to me today. She saw Elizabeth Dinh few weeks ago. Briefly, she has Gained 8 lbs since beginning of 2021. Due to new diagnosis of psoriasis and arthritis.   Gained 25-30 lbs in the past 3 years.   Current weight is almost at its highest.   Body mass index is 29.95 kg/m .    Was started on victoza for weight loss (this year). Tried it for 2 months and she lost only 3 lbs.Also hormones for weight loss. (estrogen, progesterone, testosterone).   Has HTN. A1c 5.0.   Ozempic was suggested as an alternative. Needed pre-approval.     Diet: was doing intermittent fasting and was making progress. Otezla messed everything up. Ended up stopping the fasting, and snacking more.   Usually:   - breakfast: egg or granola bar  - lunch: salad and turkey  - dinner: chicken and vegetables    Was not tracking her diet when she was sick.   For the past week, she has been trying to aim for <1000 Kcal/day.    Still taking Victoza at 1.8 mg daily. It helps with inflammation. It also has an appetite reducing effect.     When she has tracked, she has used an juan manuel in the past, and also paper.     Activity: no routine    Hypothyroidism for about 25 years. Has been taking NP thyroid for a while. TSH of 1 which is where she feels good. Unsure of the dose.     Has been taking Pellets of estrogen and testosterone. Compounded progesterone.      Otezla was making her vomit. Stopped NP thyroid because of that. Has been off for 2 months. No symptoms of hypothyroidism.   Now off of Otezla and on Humira.   Did not take prednisone.     Has bilateral knee pain right now.     CURRENT WEIGHT:   180 lbs 0 oz    Weight Tracking:   Weight at initial [facility] Visit: 5/7/2021: 180 lbs  Weight change since last seen on 5/7/2021: no change  Weight change since initial [facility] visit: 5/7/21: no change      Initial Weight (lbs):  "180 lbs  Last Visits Weight: 81.6 kg (180 lb)  Cumulative weight loss (lbs): 0  Weight Loss Percentage: 0%    Changes and Difficulties 5/17/2021   I have made the following changes to my diet since my last visit: none   With regards to my diet, I am still struggling with: seeing results   I have made the following changes to my activity/exercise since my last visit: none   With regards to my activity/exercise, I am still struggling with: pain       VITALS:  Ht 1.651 m (5' 5\")   Wt 81.6 kg (180 lb)   BMI 29.95 kg/m      MEDICATIONS:   Current Outpatient Medications   Medication Sig Dispense Refill     acetaminophen (TYLENOL) 500 MG tablet Take 1,000 mg by mouth       adalimumab (HUMIRA *CF*) 40 MG/0.4ML pen kit Inject 0.4 mLs (40 mg) Subcutaneous every 14 days 1 each 6     atenolol (TENORMIN) 25 MG tablet Take 1 tablet (25 mg) by mouth daily 90 tablet 3     azelastine (ASTELIN) 0.1 % nasal spray Spray 2 sprays into both nostrils 2 times daily 1 Bottle 11     estrogen conj-synthetic B (ENJUVIA) 0.625 MG tablet Take 1 tablet (0.625 mg) by mouth daily IMPLANTED ESTROGEN AND TESTOSTERONE PELLET, LEFT HIP 1 tablet 0     losartan (COZAAR) 50 MG tablet Take one daily 90 tablet 1     progesterone (PROMETRIUM) 100 MG capsule Take 1 capsule (100 mg) by mouth daily 90 capsule 3     Semaglutide,0.25 or 0.5MG/DOS, 2 MG/1.5ML SOPN Inject 0.25 mg subcutaneously once weekly for 4 weeks then 0.5 mg once weekly. 1.5 mL 1     Vitamin D3 (CHOLECALCIFEROL) 25 mcg (1000 units) tablet Take 1 tablet (25 mcg) by mouth daily 90 tablet 3       Weight Loss Medication History Reviewed With Patient 5/17/2021   Which weight loss medications are you currently taking on a regular basis?  Victoza (liraglutide)   Are you having any side effects from the weight loss medication that we have prescribed you? Yes   If you are having side effects please describe: slow digestion leading to nausea/vomiting           PHYSICAL EXAM:  Objective    Ht 1.651 m " "(5' 5\")   Wt 81.6 kg (180 lb)   BMI 29.95 kg/m    Vitals - Patient Reported  Pain Score: Extreme Pain (8)  Pain Loc: Knee      Vitals:  No vitals were obtained today due to virtual visit.    Physical Exam   GENERAL: Healthy, alert and no distress  EYES: Eyes grossly normal to inspection.  No discharge or erythema, or obvious scleral/conjunctival abnormalities.  RESP: No audible wheeze, cough, or visible cyanosis.  No visible retractions or increased work of breathing.    SKIN: Visible skin clear. No significant rash, abnormal pigmentation or lesions.  NEURO: Cranial nerves grossly intact.  Mentation and speech appropriate for age.  PSYCH: Mentation appears normal, affect normal/bright, judgement and insight intact, normal speech and appearance well-groomed.      Sincerely,    Jerrell Sun MD    juan m is a 56 year old who is being evaluated via a billable video visit.      How would you like to obtain your AVS? MyChart  If the video visit is dropped, the invitation should be resent by: Text to cell phone: [phone]  Will anyone else be joining your video visit? No      Video Start Time: 10:10  Video-Visit Details    Type of service:  Video Visit    Video End Time:10:45    Originating Location (pt. Location): Home    Distant Location (provider location):  [facility] [place]     Platform used for Video Visit: [email or web address]      "

## 2021-05-17 NOTE — TELEPHONE ENCOUNTER
Talked with patient in order to schedule 2 month follow up with Dr. Sun. Patient declined scheduling for now since she would like to figure out medication first.     She will call back when she wants to schedule.

## 2021-05-18 ENCOUNTER — MYC MEDICAL ADVICE (OUTPATIENT)
Dept: ENDOCRINOLOGY | Facility: CLINIC | Age: 56
End: 2021-05-18

## 2021-05-18 NOTE — TELEPHONE ENCOUNTER
Central Prior Authorization Team   Phone: [phone]      PA Initiation    Medication: semaglutide (OZEMPIC, 1 MG/DOSE,) 2 MG/1.5ML pen-PA initiated  Insurance Company: [employer] ([facility]) - Phone [phone] Fax [phone]  Pharmacy Filling the Rx: [id number] [place], [place] - [facility] [address]  Filling Pharmacy Phone: [phone]  Filling Pharmacy Fax:    Start Date: 5/18/2021

## 2021-05-19 NOTE — TELEPHONE ENCOUNTER
PRIOR AUTHORIZATION DENIED    Medication: semaglutide (OZEMPIC, 1 MG/DOSE,) 2 MG/1.5ML pen-PA denied    Denial Date: 5/19/2021    Denial Rational: This can only be approved for DM2        Appeal Information:

## 2021-05-24 ENCOUNTER — TRANSFERRED RECORDS (OUTPATIENT)
Dept: HEALTH INFORMATION MANAGEMENT | Facility: CLINIC | Age: 56
End: 2021-05-24

## 2021-05-31 NOTE — PROGRESS NOTES
Rheumatology Clinic Visit--in person   Jocelin Hernandez MRN# [id number]   YOB: 1965 Age: 56 year old     Date of Visit: 06/01/2021  Primary care provider: Debbie Grider          Assessment and Plan:     #Psoriatic arthritis (diffuse polyarthralgia, scalp psoriasis; RF, ACPA neg): Decreased swelling and discomfort in the small joints of the hands and wrists with exposure to Humira for 6 weeks.  Improved diarrhea, but worsened scalp psoriasis has occurred in the same time.  Exam shows scalp psoriasis, and joint line tenderness at the left knee without effusion.  Lab work from March 22, 2021 showed rheumatoid factor, cyclic citrullinated peptide negative; ESR and CRP were normal.  Hemoglobin A1c was 5.0    Overall, psoriatic arthritis is modestly improved. I think that Humira has been beneficial, primarily for the joints thus far, and I recommend continuing the agent.  If scalp psoriasis does not respond to topicals or light therapy, options would include adding methotrexate or switching to another biologic, Cosentyx to combat both skin and joint manifestations with monotherapy.    # Meniscal tear and pain in the right knee and hip: I do not think discomfort is related to inflammatory arthritis.    # Low back pain and sciatica: I do not think discomfort is related to inflammatory arthritis.    Plan:  1.  Continue Humira 40 mg subcutaneous every other week.  2.  Consult with dermatology regarding light and topical therapy for scalp psoriasis.  Discussion of above agents should be had if Humira does not result in improvement over the next 2 to 3 injections.  3. Shingrix vaccination today    RTC 3 mos    Israel Polk M.D.  Staff Rheumatologist,  Health  Pager [phone]          Active Problem List:     Patient Active Problem List    Diagnosis Date Noted     Obesity, Class I, BMI 30-34.9 05/07/2021     Priority: Medium     Metabolic syndrome 05/07/2021     Priority: Medium     Food intolerance  "05/07/2021     Priority: Medium     Bloating 05/07/2021     Priority: Medium     Heart burn 05/07/2021     Priority: Medium     Benign essential hypertension 07/17/2019     Priority: Medium     Loose stools 07/17/2019     Priority: Medium     Subclinical hypothyroidism 08/10/2018     Priority: Medium     Poor sleep 08/10/2018     Priority: Medium     Palpitations 07/03/2018     Priority: Medium     Panic attack 07/03/2018     Priority: Medium     Rosacea 05/22/2018     Priority: Medium     Overview:   Created by Conversion       Aftercare 03/20/2018     Priority: Medium            History of Present Illness:   Jocelin Hernandez presents for follow-up of psoriatic arthritis.  She was last seen in March 2021.  Inflammatory arthritis in the context of psoriasis was active at that time.  I recommended discontinuing Otezla, and starting Humira.    Interval history 06-:  Patient discontinued Otezla shortly after the last visit in March 2021.  Within a few weeks, she experienced a flare of joint pain.  I recommended a course of prednisone while awaiting start of recommended Humira. She never started prednisone.  Prior authorization for Humira came through quickly, and she did not wish to mix prednisone and Humira.    Fingers are \"thinner\" since starting humira 3 injections. No injection site reactions.  R knee pain persists with R LB and hip discomfort.   \"GI issues\" including uncontrolled diarrhea have disappeared since humira started.  Skin: psoriasis on the scalp has increased recently; she is doing light therapy.  Finger \"cyst,\" r 2nd finger; s/p Dermatology freezing, still hurts.    Interval history 3-2021:    Patient saw Dr. Araujo in in dermatology last on February 23, 2021.  At that time, psoriasis was judged to be under improved control with use of Otezla monotherapy, in combination with clobetasol topical.  However patient related joint pain in several fingers, and patient was referred for consideration of " psoriatic arthritis.    Patient has had psoriasis the past few years, first originating in her scalp and inner ears. The psoriasis has been managed topically and under good control. Within the past few months, the psoriasis flared involving new rashes on the side of her back trunks and worsening of patches in her scalp. She was started on Otezla, which has provided better control of the psoriasis, however she has only been able to take half the maximal dose due to side gastrointestinal side effects.     Patien notes having joint pain the past few years at basal levels. Back then, she attributed the arthralgia to knee replacement surgery but notices that the joint pain has been worse ever since her psoriasis has flared. Affected joints include her fingers, feet, kenes, hips and lower back. She has been having difficulty sleeping or awakening during the middle of the night due to the pain. She notes some visible swelling in her PIPs, involving her 1st and 2nd digit on her L hand and the 5th digit on her R hand. Swelling in ankles and the L knee are present. Stiffness in the joints is worse in the morning and typically lasts 1 hour. She has tried using advil, tylenol, pilates and some physical therapy but has provided minimal relief. She recently took some gabapentin for pain and has helped her with sleep.     Along with the joint pain, other notable symptoms include chronic dry eyes, which she is using Xiidra. She has been having bladder retention issues and is being seen by urology.          Review of Systems:       Constitutional: negative  Skin: psoriatic plaques affecting her scalp and lower back, trunk  Eyes: dry eyes; no change in vision  Ears/Nose/Throat: negative  Respiratory: No shortness of breath, dyspnea on exertion, cough, or hemoptysis  Cardiovascular: negative  Gastrointestinal: negative  Genitourinary: bladder retention; being followed by urology;  Musculoskeletal: polyarthralgia as mentioned  "above  Neurologic: persistent numbness/tingling below the knee (likely from the knee replacement surgery)  Psychiatric: negative  Hematologic/Lymphatic/Immunologic: negative  Endocrine: negative          Past Medical History:     Past Medical History:   Diagnosis Date     Hypertension      Hypothyroid      Iliotibial band syndrome affecting left lower leg 11/23/2018     Palpitations      Pneumonia      Shortness of breath      Past Surgical History:   Procedure Laterality Date     COLONOSCOPY  04/2021    repeat 5 yr, [facility]     KNEE SURGERY Left 11/2017    meniscal repair            Social History:     Social History     Occupational History     Not on file   Tobacco Use     Smoking status: Never Smoker     Smokeless tobacco: Never Used   Substance and Sexual Activity     Alcohol use: Yes     Alcohol/week: 1.0 standard drinks     Types: 1 Glasses of wine per week     Comment: socially     Drug use: No     Sexual activity: Not on file            Family History:     Family History   Problem Relation Age of Onset     Lung Cancer Mother      Hypertension Mother      Cerebrovascular Disease Father 70     Hypertension Father      Diabetes Father      No Known Problems Sister      No Known Problems Son      No Known Problems Daughter      No Known Problems Sister      No Known Problems Daughter    Sister - Psoriasis  Father & Maternal grandmother - arthritis; unknown whether they had inflammatory or osteoarthritis, but patient notes they had \"twisted fingers\"         Allergies:     Allergies   Allergen Reactions     Hydrocortisone      Molds & Smuts      No Clinical Screening - See Comments Other (See Comments)     Otezla [Apremilast] GI Disturbance     Vomiting      Seasonal Allergies             Medications:     Current Outpatient Medications   Medication Sig Dispense Refill     acetaminophen (TYLENOL) 500 MG tablet Take 1,000 mg by mouth       adalimumab (HUMIRA *CF*) 40 MG/0.4ML pen kit Inject 0.4 mLs (40 mg) " Subcutaneous every 14 days 1 each 6     atenolol (TENORMIN) 25 MG tablet Take 1 tablet (25 mg) by mouth daily 90 tablet 3     azelastine (ASTELIN) 0.1 % nasal spray Spray 2 sprays into both nostrils 2 times daily 1 Bottle 11     estrogen conj-synthetic B (ENJUVIA) 0.625 MG tablet Take 1 tablet (0.625 mg) by mouth daily IMPLANTED ESTROGEN AND TESTOSTERONE PELLET, LEFT HIP 1 tablet 0     losartan (COZAAR) 50 MG tablet Take one daily 90 tablet 1     progesterone (PROMETRIUM) 100 MG capsule Take 1 capsule (100 mg) by mouth daily 90 capsule 3     semaglutide (OZEMPIC, 1 MG/DOSE,) 2 MG/1.5ML pen Inject 1 mg Subcutaneous every 7 days (Patient not taking: Reported on 6/1/2021) 12 mL 3     Vitamin D3 (CHOLECALCIFEROL) 25 mcg (1000 units) tablet Take 1 tablet (25 mcg) by mouth daily 90 tablet 3            Physical Exam:   Blood pressure (!) 143/86, pulse 69, weight 80.1 kg (176 lb 8 oz), SpO2 99 %.  Wt Readings from Last 4 Encounters:   06/01/21 80.1 kg (176 lb 8 oz)   05/17/21 81.6 kg (180 lb)   05/07/21 81.6 kg (180 lb)   05/04/21 81.9 kg (180 lb 8 oz)       Constitutional: well-developed, appearing stated age; cooperative  Eyes: nl EOM, PERRLA, vision, conjunctiva, sclera  ENT: nl external ears, nose, hearing, lips, teeth  Neck: no mass or thyroid enlargement  Resp: unlabored breathing to room air  CV: not assessed  GI: not assessed  : not tested  Lymph: no visible cervical, supraclavicular, or epitrochlear nodes  MS: Full ROM present in her wrists and shoulders.  No palpable synovitis in MCPs, PIPs;  strength good and fist formation normal.  No synovitis at knees, ankles, or forefeet.  No dactylitis.  Skin: Widespread psoriasis in the crown of the scalp      Neuro: nl cranial nerves  Psych: nl judgement, orientation, memory, affect.         Data:     No results found for any visits on 06/01/21.    RHEUM RESULTS Latest Ref Rng & Units 4/3/2019 11/13/2020 3/22/2021   SED RATE 0 - 30 mm/h - - 5   CRP, INFLAMMATION 0.0  - 8.0 mg/L - - <2.9   RHEUMATOID FACTOR <12 IU/mL - - <7   HGB 11.7 - 15.7 g/dL 14.4 - -   HCT 35.0 - 47.0 % 42.3 - -   MCV 78.0 - 100.0 fL 100.5(H) - -   MCHC 32.0 - 36.0 g/dL 34.0 - -   RDW % 11.9 - -   CREATININE 0.6 - 1.3 mg/dL 0.67 0.6 -   GFR ESTIMATE, IF BLACK >60 mL/min/[1.73:m2] >90 - -   GFR ESTIMATE >60 mL/min/[1.73:m2] >90 - -     .

## 2021-06-01 ENCOUNTER — OFFICE VISIT (OUTPATIENT)
Dept: RHEUMATOLOGY | Facility: CLINIC | Age: 56
End: 2021-06-01
Attending: INTERNAL MEDICINE
Payer: COMMERCIAL

## 2021-06-01 VITALS
HEART RATE: 69 BPM | SYSTOLIC BLOOD PRESSURE: 143 MMHG | DIASTOLIC BLOOD PRESSURE: 86 MMHG | BODY MASS INDEX: 29.37 KG/M2 | OXYGEN SATURATION: 99 % | WEIGHT: 176.5 LBS

## 2021-06-01 DIAGNOSIS — L40.50 PSORIATIC ARTHRITIS (H): ICD-10-CM

## 2021-06-01 PROCEDURE — 99214 OFFICE O/P EST MOD 30 MIN: CPT | Performed by: INTERNAL MEDICINE

## 2021-06-01 PROCEDURE — G0463 HOSPITAL OUTPT CLINIC VISIT: HCPCS

## 2021-06-01 NOTE — NURSING NOTE
Chief Complaint   Patient presents with     RECHECK     psoriatic arthritis         BP (!) 143/86 (BP Location: Right arm, Patient Position: Sitting, Cuff Size: Adult Regular)   Pulse 69   Wt 80.1 kg (176 lb 8 oz)   SpO2 99%   BMI 29.37 kg/m        Bebo Dasilva, EMT

## 2021-06-01 NOTE — LETTER
6/1/2021       RE: Jocelin Hernandez  [address] [facility] [id number]     Dear Colleague,    Thank you for referring your patient, Jocelin Hernandez, to the [facility] RHEUM[facility]OLOG[facility] [place] at [facility]. Please see a copy of my visit note below.    Rheumatology Clinic Visit--in person   Jocelin Hernandez MRN# [id number]   YOB: 1965 Age: 56 year old     Date of Visit: 06/01/2021  Primary care provider: Debbie Grider          Assessment and Plan:     #Psoriatic arthritis (diffuse polyarthralgia, scalp psoriasis; RF, ACPA neg): Decreased swelling and discomfort in the small joints of the hands and wrists with exposure to Humira for 6 weeks.  Improved diarrhea, but worsened scalp psoriasis has occurred in the same time.  Exam shows scalp psoriasis, and joint line tenderness at the left knee without effusion.  Lab work from March 22, 2021 showed rheumatoid factor, cyclic citrullinated peptide negative; ESR and CRP were normal.  Hemoglobin A1c was 5.0    Overall, psoriatic arthritis is modestly improved. I think that Humira has been beneficial, primarily for the joints thus far, and I recommend continuing the agent.  If scalp psoriasis does not respond to topicals or light therapy, options would include adding methotrexate or switching to another biologic, Cosentyx to combat both skin and joint manifestations with monotherapy.    # Meniscal tear and pain in the right knee and hip: I do not think discomfort is related to inflammatory arthritis.    # Low back pain and sciatica: I do not think discomfort is related to inflammatory arthritis.    Plan:  1.  Continue Humira 40 mg subcutaneous every other week.  2.  Consult with dermatology regarding light and topical therapy for scalp psoriasis.  Discussion of above agents should be had if Humira does not result in improvement over the next 2 to 3 injections.  3. Shingrix  "vaccination today    RTC 3 mos    Israel Polk M.D.  Staff Rheumatologist,  Health  Pager [phone]          Active Problem List:     Patient Active Problem List    Diagnosis Date Noted     Obesity, Class I, BMI 30-34.9 05/07/2021     Priority: Medium     Metabolic syndrome 05/07/2021     Priority: Medium     Food intolerance 05/07/2021     Priority: Medium     Bloating 05/07/2021     Priority: Medium     Heart burn 05/07/2021     Priority: Medium     Benign essential hypertension 07/17/2019     Priority: Medium     Loose stools 07/17/2019     Priority: Medium     Subclinical hypothyroidism 08/10/2018     Priority: Medium     Poor sleep 08/10/2018     Priority: Medium     Palpitations 07/03/2018     Priority: Medium     Panic attack 07/03/2018     Priority: Medium     Rosacea 05/22/2018     Priority: Medium     Overview:   Created by Conversion       Aftercare 03/20/2018     Priority: Medium            History of Present Illness:   Jocelin Hernandez presents for follow-up of psoriatic arthritis.  She was last seen in March 2021.  Inflammatory arthritis in the context of psoriasis was active at that time.  I recommended discontinuing Otezla, and starting Humira.    Interval history 06-:  Patient discontinued Otezla shortly after the last visit in March 2021.  Within a few weeks, she experienced a flare of joint pain.  I recommended a course of prednisone while awaiting start of recommended Humira. She never started prednisone.  Prior authorization for Humira came through quickly, and she did not wish to mix prednisone and Humira.    Fingers are \"thinner\" since starting humira 3 injections. No injection site reactions.  R knee pain persists with R LB and hip discomfort.   \"GI issues\" including uncontrolled diarrhea have disappeared since humira started.  Skin: psoriasis on the scalp has increased recently; she is doing light therapy.  Finger \"cyst,\" r 2nd finger; s/p Dermatology freezing, still " fernando.    Interval history 3-2021:    Patient saw Dr. Araujo in in dermatology last on February 23, 2021.  At that time, psoriasis was judged to be under improved control with use of Otezla monotherapy, in combination with clobetasol topical.  However patient related joint pain in several fingers, and patient was referred for consideration of psoriatic arthritis.    Patient has had psoriasis the past few years, first originating in her scalp and inner ears. The psoriasis has been managed topically and under good control. Within the past few months, the psoriasis flared involving new rashes on the side of her back trunks and worsening of patches in her scalp. She was started on Otezla, which has provided better control of the psoriasis, however she has only been able to take half the maximal dose due to side gastrointestinal side effects.     Patien notes having joint pain the past few years at basal levels. Back then, she attributed the arthralgia to knee replacement surgery but notices that the joint pain has been worse ever since her psoriasis has flared. Affected joints include her fingers, feet, kenes, hips and lower back. She has been having difficulty sleeping or awakening during the middle of the night due to the pain. She notes some visible swelling in her PIPs, involving her 1st and 2nd digit on her L hand and the 5th digit on her R hand. Swelling in ankles and the L knee are present. Stiffness in the joints is worse in the morning and typically lasts 1 hour. She has tried using advil, tylenol, pilates and some physical therapy but has provided minimal relief. She recently took some gabapentin for pain and has helped her with sleep.     Along with the joint pain, other notable symptoms include chronic dry eyes, which she is using Xiidra. She has been having bladder retention issues and is being seen by urology.          Review of Systems:       Constitutional: negative  Skin: psoriatic plaques affecting her  "scalp and lower back, trunk  Eyes: dry eyes; no change in vision  Ears/Nose/Throat: negative  Respiratory: No shortness of breath, dyspnea on exertion, cough, or hemoptysis  Cardiovascular: negative  Gastrointestinal: negative  Genitourinary: bladder retention; being followed by urology;  Musculoskeletal: polyarthralgia as mentioned above  Neurologic: persistent numbness/tingling below the knee (likely from the knee replacement surgery)  Psychiatric: negative  Hematologic/Lymphatic/Immunologic: negative  Endocrine: negative          Past Medical History:     Past Medical History:   Diagnosis Date     Hypertension      Hypothyroid      Iliotibial band syndrome affecting left lower leg 11/23/2018     Palpitations      Pneumonia      Shortness of breath      Past Surgical History:   Procedure Laterality Date     COLONOSCOPY  04/2021    repeat 5 yr, [facility]     KNEE SURGERY Left 11/2017    meniscal repair            Social History:     Social History     Occupational History     Not on file   Tobacco Use     Smoking status: Never Smoker     Smokeless tobacco: Never Used   Substance and Sexual Activity     Alcohol use: Yes     Alcohol/week: 1.0 standard drinks     Types: 1 Glasses of wine per week     Comment: socially     Drug use: No     Sexual activity: Not on file            Family History:     Family History   Problem Relation Age of Onset     Lung Cancer Mother      Hypertension Mother      Cerebrovascular Disease Father 70     Hypertension Father      Diabetes Father      No Known Problems Sister      No Known Problems Son      No Known Problems Daughter      No Known Problems Sister      No Known Problems Daughter    Sister - Psoriasis  Father & Maternal grandmother - arthritis; unknown whether they had inflammatory or osteoarthritis, but patient notes they had \"twisted fingers\"         Allergies:     Allergies   Allergen Reactions     Hydrocortisone      Molds & Smuts      No Clinical Screening - See Comments " Other (See Comments)     Otezla [Apremilast] GI Disturbance     Vomiting      Seasonal Allergies             Medications:     Current Outpatient Medications   Medication Sig Dispense Refill     acetaminophen (TYLENOL) 500 MG tablet Take 1,000 mg by mouth       adalimumab (HUMIRA *CF*) 40 MG/0.4ML pen kit Inject 0.4 mLs (40 mg) Subcutaneous every 14 days 1 each 6     atenolol (TENORMIN) 25 MG tablet Take 1 tablet (25 mg) by mouth daily 90 tablet 3     azelastine (ASTELIN) 0.1 % nasal spray Spray 2 sprays into both nostrils 2 times daily 1 Bottle 11     estrogen conj-synthetic B (ENJUVIA) 0.625 MG tablet Take 1 tablet (0.625 mg) by mouth daily IMPLANTED ESTROGEN AND TESTOSTERONE PELLET, LEFT HIP 1 tablet 0     losartan (COZAAR) 50 MG tablet Take one daily 90 tablet 1     progesterone (PROMETRIUM) 100 MG capsule Take 1 capsule (100 mg) by mouth daily 90 capsule 3     semaglutide (OZEMPIC, 1 MG/DOSE,) 2 MG/1.5ML pen Inject 1 mg Subcutaneous every 7 days (Patient not taking: Reported on 6/1/2021) 12 mL 3     Vitamin D3 (CHOLECALCIFEROL) 25 mcg (1000 units) tablet Take 1 tablet (25 mcg) by mouth daily 90 tablet 3            Physical Exam:   Blood pressure (!) 143/86, pulse 69, weight 80.1 kg (176 lb 8 oz), SpO2 99 %.  Wt Readings from Last 4 Encounters:   06/01/21 80.1 kg (176 lb 8 oz)   05/17/21 81.6 kg (180 lb)   05/07/21 81.6 kg (180 lb)   05/04/21 81.9 kg (180 lb 8 oz)       Constitutional: well-developed, appearing stated age; cooperative  Eyes: nl EOM, PERRLA, vision, conjunctiva, sclera  ENT: nl external ears, nose, hearing, lips, teeth  Neck: no mass or thyroid enlargement  Resp: unlabored breathing to room air  CV: not assessed  GI: not assessed  : not tested  Lymph: no visible cervical, supraclavicular, or epitrochlear nodes  MS: Full ROM present in her wrists and shoulders.  No palpable synovitis in MCPs, PIPs;  strength good and fist formation normal.  No synovitis at knees, ankles, or forefeet.  No  dactylitis.  Skin: Widespread psoriasis in the crown of the scalp      Neuro: nl cranial nerves  Psych: nl judgement, orientation, memory, affect.         Data:     No results found for any visits on 06/01/21.    RHEUM RESULTS Latest Ref Rng & Units 4/3/2019 11/13/2020 3/22/2021   SED RATE 0 - 30 mm/h - - 5   CRP, INFLAMMATION 0.0 - 8.0 mg/L - - <2.9   RHEUMATOID FACTOR <12 IU/mL - - <7   HGB 11.7 - 15.7 g/dL 14.4 - -   HCT 35.0 - 47.0 % 42.3 - -   MCV 78.0 - 100.0 fL 100.5(H) - -   MCHC 32.0 - 36.0 g/dL 34.0 - -   RDW % 11.9 - -   CREATININE 0.6 - 1.3 mg/dL 0.67 0.6 -   GFR ESTIMATE, IF BLACK >60 mL/min/[1.73:m2] >90 - -   GFR ESTIMATE >60 mL/min/[1.73:m2] >90 - -     .        Again, thank you for allowing me to participate in the care of your patient.      Sincerely,    Israel Polk MD

## 2021-06-01 NOTE — PATIENT INSTRUCTIONS
Diagnosis:  1.  Psoriatic arthritis: Decreased swelling and discomfort in the small joints of the hands and wrists with exposure to Humira for 6 weeks.  Improved diarrhea, but worsened scalp psoriasis has occurred in the same time.  I think that Humira has been beneficial, primarily for the joints thus far, and I recommend continuing.  If scalp psoriasis does not respond to topicals or light therapy, options would include adding methotrexate or switching to another biologic, Cosentyx.  2.  Meniscal tear and pain in the right knee and hip: I do not think discomfort is related to inflammatory arthritis.  3.  Low back pain and sciatica: I do not think discomfort is related to inflammatory arthritis.    Plan:  1.  Continue Humira 40 mg subcutaneous every other week.  2.  Consult with dermatology regarding light and topical therapy for scalp psoriasis.  Discussion of above agents should be had if Humira does not result in improvement over the next 2 to 3 injections.  3. Shingrix vaccination today

## 2021-06-10 ENCOUNTER — TRANSFERRED RECORDS (OUTPATIENT)
Dept: HEALTH INFORMATION MANAGEMENT | Facility: CLINIC | Age: 56
End: 2021-06-10

## 2021-06-28 ENCOUNTER — MYC MEDICAL ADVICE (OUTPATIENT)
Dept: RHEUMATOLOGY | Facility: CLINIC | Age: 56
End: 2021-06-28

## 2021-06-28 DIAGNOSIS — L40.50 PSORIATIC ARTHRITIS (H): Primary | ICD-10-CM

## 2021-06-28 DIAGNOSIS — I10 BENIGN ESSENTIAL HYPERTENSION: ICD-10-CM

## 2021-06-28 RX ORDER — LOSARTAN POTASSIUM 100 MG/1
TABLET ORAL
Qty: 90 TABLET | Refills: 0 | Status: SHIPPED | OUTPATIENT
Start: 2021-06-28 | End: 2021-09-09

## 2021-06-28 RX ORDER — AMLODIPINE BESYLATE 5 MG/1
5 TABLET ORAL DAILY
Qty: 90 TABLET | Refills: 0 | Status: SHIPPED | OUTPATIENT
Start: 2021-06-28 | End: 2021-09-09

## 2021-07-01 NOTE — TELEPHONE ENCOUNTER
Symptoms represent incompletely suppressed inflammatory arthritis, associated with psoriasis.  I now recommend combination immunotherapy, as discussed in my note from the encounter in early June 2021.  I recommend adding methotrexate 4 tablets (10 mg) once weekly.  While on methotrexate, patient should undergo blood monitoring for liver and kidney function every 12 to 14 weeks.  Continue Humira injected 40 mg every other week.

## 2021-07-01 NOTE — TELEPHONE ENCOUNTER
Left message for Jocelin to return my call but will also respond to the mychart.    Maryam Saldana MSN, RN  Rheumatology RN Care Coordinator  [facility]

## 2021-07-02 ENCOUNTER — MYC MEDICAL ADVICE (OUTPATIENT)
Dept: RHEUMATOLOGY | Facility: CLINIC | Age: 56
End: 2021-07-02

## 2021-07-02 DIAGNOSIS — L40.50 PSORIATIC ARTHRITIS (H): Primary | ICD-10-CM

## 2021-07-02 NOTE — TELEPHONE ENCOUNTER
Left message for Jocelin to return my call but will also send message in regards       I now recommend combination immunotherapy, as discussed in my note from the encounter in early June 2021.  I recommend adding methotrexate 4 tablets (10 mg) once weekly.  While on methotrexate, patient should undergo blood monitoring for liver and kidney function every 12 to 14 weeks.  Continue Humira injected 40 mg every other week.    Maryam Saldana MSN, RN  Rheumatology RN Care Coordinator  [facility]

## 2021-07-11 NOTE — PROGRESS NOTES
"Jocelin Hernandez is a 56 year old female here for the following issues:    Benign essential hypertension   Jocelin is a 57 yo woman with hx of hypertension. She has a blood pressure cuff at home that is accurate.  Home BP readings have been slightly higher than today in clinic, systolic in the high 130s.     She felt rise in BP may have been triggered by use of Humira that she uses to treat psoriasis. This is not a typical side effect.    She is taking amlodipine 5mg daily and I recently increased the dose of losartan from 50mg to 100mg.  Blood pressures have come down. She wishes to continue these medications.       BP Readings from Last 3 Encounters:   07/12/21 132/85   06/01/21 (!) 143/86   05/04/21 (!) 140/85       Increased thirst, dry eyes and mouth  Jocelin reports that lately , she has noticed an increase in her thirst, especially at night. Her throat gets so dry that it feels as though she is choking, \"it is as if my throat is closing\". Symptoms disrupt sleep.  She also gets up 3-4 times during the night to urinate. She has noticed increased urinary frequency and urgency during the day. She denies any leakage or dysuria. No hematuria. She also experiences dry eyes in the mornings, that feels like sand paper, and vaginal dryness. Her symptoms started before the change in BP medication, but it seemed to worsen after the change. She is wondering if BP medication are causing these symptoms. She had an A1c checked in May, with no evidence for DM. She has hx of psoriatic arthritis and is managed with Humira. She is working with her rheumatologist on Humira dosing. Was not able to tolerate methotrexate, due to severe GI side effects.     Lab Results   Component Value Date    A1C 5.0 05/04/2021       Bilateral knee pain  Jocelin has been experiencing bilateral knee pain due to a torn meniscus in her right knee and arthritis in both knees. She has been following with Dr. Campos for this.  Her Humira is only lasting " slightly longer than 1 week at a time. She follows with her rheumatologist approximately every 2 weeks for Humira injections. She tried methyltrexate, and this caused nausea and skin sensitivities.      Body mass index is 29.41 kg/m .  Jocelin has been following with the weight loss clinic. She was previously on Victoza, but she has not been able to get it refilled. She was supposed to switch to Ozempic, but medication was not approved by her insurance. In order to have prior authorization, she needed to have documented A1c >6.5% or fasting blood sugar >126. There is no documentation in the chart, although she has been diagnosed with metabolic syndrome. She believes that even the lower dose of Victoza was helpful for her and seemed to improve her psoriasis.  I have recommended she schedule an appt with the weight management clinic to discuss refills.     Wt Readings from Last 4 Encounters:   07/12/21 80.2 kg (176 lb 12 oz)   06/01/21 80.1 kg (176 lb 8 oz)   05/17/21 81.6 kg (180 lb)   05/07/21 81.6 kg (180 lb)       Interested in shingles vaccine series  Jocelin is requesting shingles vaccine series.  I have recommended she check with her insurance to see if she can obtain this in a clinic setting vs pharmacy. She will make a future nurse appointment if covered here.     Patient Active Problem List   Diagnosis     Aftercare     Rosacea     Palpitations     Panic attack     Subclinical hypothyroidism     Poor sleep     Benign essential hypertension     Loose stools     Obesity, Class I, BMI 30-34.9     Metabolic syndrome     Food intolerance     Bloating     Heart burn       Current Outpatient Medications   Medication Sig Dispense Refill     acetaminophen (TYLENOL) 500 MG tablet Take 1,000 mg by mouth       adalimumab (HUMIRA *CF*) 40 MG/0.4ML pen kit Inject 0.4 mLs (40 mg) Subcutaneous every 14 days 1 each 6     amLODIPine (NORVASC) 5 MG tablet Take 1 tablet (5 mg) by mouth daily 90 tablet 0     atenolol (TENORMIN)  25 MG tablet Take 1 tablet (25 mg) by mouth daily 90 tablet 3     azelastine (ASTELIN) 0.1 % nasal spray Spray 2 sprays into both nostrils 2 times daily 1 Bottle 11     estrogen conj-synthetic B (ENJUVIA) 0.625 MG tablet Take 1 tablet (0.625 mg) by mouth daily IMPLANTED ESTROGEN AND TESTOSTERONE PELLET, LEFT HIP 1 tablet 0     losartan (COZAAR) 100 MG tablet Take one daily 90 tablet 0     methotrexate 2.5 MG tablet Take 4 tablets (10 mg) by mouth every 7 days 16 tablet 3     progesterone (PROMETRIUM) 100 MG capsule Take 1 capsule (100 mg) by mouth daily 90 capsule 3     semaglutide (OZEMPIC, 1 MG/DOSE,) 2 MG/1.5ML pen Inject 1 mg Subcutaneous every 7 days (Patient not taking: Reported on 6/1/2021) 12 mL 3     Vitamin D3 (CHOLECALCIFEROL) 25 mcg (1000 units) tablet Take 1 tablet (25 mcg) by mouth daily 90 tablet 3       Allergies   Allergen Reactions     Hydrocortisone      Molds & Smuts      No Clinical Screening - See Comments Other (See Comments)     Otezla [Apremilast] GI Disturbance     Vomiting      Seasonal Allergies         EXAM  /85 (BP Location: Right arm, Patient Position: Sitting, Cuff Size: Adult Large)   Pulse 78   Temp 97.2  F (36.2  C) (Skin)   Resp 13   Wt 80.2 kg (176 lb 12 oz)   SpO2 100%   BMI 29.41 kg/m    Gen: Alert, pleasant, NAD  Exam is deferred today    Assessment:  (I10) Benign essential hypertension  (primary encounter diagnosis)  Comment: BP in target range today   Plan: Continue current medication regimen, will given 6 months of refills. Return in 6 months for next check.      (R68.2) Dry mouth  Comment: Etiology is unclear. No use of diuretics. A1c is 5%. No medications that currently have these side effects.   Plan: SSA Ro JESUS Antibody IgG, SSB La JESUS Antibody         IgG        Check for Sjogren's.     (R35.0) Urinary frequency  Comment: New onset of urinary frequency, no dysuria or hematuria.   Plan: Routine UA with micro reflex to culture  Will contact Jocelin when  results return.    (Z71.89) Vaccine counseling  Comment: Patient requests the shingles vaccine , unclear if this is covered at our clinic  Plan: ZOSTER VACCINE RECOMBINANT ADJUVANTED IM NJX        Recommended she check with her insurance regarding coverage in clinic vs pharmacy. If covered at clinic, she can schedule nurse visit.      RTC in 5-6 months.     32 minutes spend on the date of this encounter doing chart review, history and exam, documentation and further activities as noted above.       Debbie Grider MD  Internal Medicine/Pediatrics      I, Eleanor Chua, am serving as a scribe to document services personally performed by Debbie Grider MD, based on data collection and the provider's statements to me. Debbie Grider MD, has reviewed, edited, and approved the above note.

## 2021-07-12 ENCOUNTER — OFFICE VISIT (OUTPATIENT)
Dept: FAMILY MEDICINE | Facility: CLINIC | Age: 56
End: 2021-07-12
Payer: COMMERCIAL

## 2021-07-12 VITALS
TEMPERATURE: 97.2 F | DIASTOLIC BLOOD PRESSURE: 85 MMHG | WEIGHT: 176.75 LBS | SYSTOLIC BLOOD PRESSURE: 132 MMHG | BODY MASS INDEX: 29.41 KG/M2 | OXYGEN SATURATION: 100 % | RESPIRATION RATE: 13 BRPM | HEART RATE: 78 BPM

## 2021-07-12 DIAGNOSIS — R68.2 DRY MOUTH: ICD-10-CM

## 2021-07-12 DIAGNOSIS — R35.0 URINARY FREQUENCY: ICD-10-CM

## 2021-07-12 DIAGNOSIS — Z71.85 VACCINE COUNSELING: ICD-10-CM

## 2021-07-12 DIAGNOSIS — E03.9 ACQUIRED HYPOTHYROIDISM: ICD-10-CM

## 2021-07-12 DIAGNOSIS — L40.50 PSORIATIC ARTHRITIS (H): ICD-10-CM

## 2021-07-12 DIAGNOSIS — I10 BENIGN ESSENTIAL HYPERTENSION: Primary | ICD-10-CM

## 2021-07-12 LAB
ALBUMIN UR-MCNC: NEGATIVE MG/DL
APPEARANCE UR: CLEAR
BACTERIA #/AREA URNS HPF: ABNORMAL /HPF
BILIRUB UR QL STRIP: NEGATIVE
COLOR UR AUTO: ABNORMAL
GLUCOSE UR STRIP-MCNC: NEGATIVE MG/DL
HGB UR QL STRIP: NEGATIVE
KETONES UR STRIP-MCNC: NEGATIVE MG/DL
LEUKOCYTE ESTERASE UR QL STRIP: NEGATIVE
MUCOUS THREADS #/AREA URNS LPF: PRESENT /LPF
NITRATE UR QL: NEGATIVE
PH UR STRIP: 6.5 [PH] (ref 5–7)
RBC URINE: 2 /HPF
SP GR UR STRIP: 1.02 (ref 1–1.03)
SPERM #/AREA URNS HPF: PRESENT /HPF
SQUAMOUS EPITHELIAL: 3 /HPF
UROBILINOGEN UR STRIP-MCNC: NORMAL MG/DL
WBC URINE: <1 /HPF

## 2021-07-12 PROCEDURE — 86235 NUCLEAR ANTIGEN ANTIBODY: CPT | Performed by: INTERNAL MEDICINE

## 2021-07-12 PROCEDURE — 84443 ASSAY THYROID STIM HORMONE: CPT | Performed by: INTERNAL MEDICINE

## 2021-07-12 PROCEDURE — 81001 URINALYSIS AUTO W/SCOPE: CPT | Performed by: INTERNAL MEDICINE

## 2021-07-12 RX ORDER — DESONIDE 0.5 MG/G
CREAM TOPICAL
COMMUNITY
Start: 2021-07-03

## 2021-07-12 RX ORDER — CLOBETASOL PROPIONATE 0.5 MG/ML
SOLUTION TOPICAL
COMMUNITY
Start: 2021-03-09

## 2021-07-12 RX ORDER — KETOCONAZOLE 20 MG/ML
SHAMPOO TOPICAL
COMMUNITY
Start: 2021-01-23

## 2021-07-12 NOTE — NURSING NOTE
56 year old  Chief Complaint   Patient presents with     Hypertension     Dry Eye(s)     dry mouth/throat     Nocturia     Immunization     Shingrix     Medication Request     Ozembic or Victoza       Blood pressure 132/85, pulse 78, temperature 97.2  F (36.2  C), temperature source Skin, resp. rate 13, weight 80.2 kg (176 lb 12 oz), SpO2 100 %. Body mass index is 29.41 kg/m .  Patient Active Problem List   Diagnosis     Aftercare     Rosacea     Palpitations     Panic attack     Subclinical hypothyroidism     Poor sleep     Benign essential hypertension     Loose stools     Obesity, Class I, BMI 30-34.9     Metabolic syndrome     Food intolerance     Bloating     Heart burn       Wt Readings from Last 2 Encounters:   07/12/21 80.2 kg (176 lb 12 oz)   06/01/21 80.1 kg (176 lb 8 oz)     BP Readings from Last 3 Encounters:   07/12/21 132/85   06/01/21 (!) 143/86   05/04/21 (!) 140/85         Current Outpatient Medications   Medication     acetaminophen (TYLENOL) 500 MG tablet     adalimumab (HUMIRA *CF*) 40 MG/0.4ML pen kit     amLODIPine (NORVASC) 5 MG tablet     azelastine (ASTELIN) 0.1 % nasal spray     clobetasol (TEMOVATE) 0.05 % external solution     desonide (DESOWEN) 0.05 % external cream     ketoconazole (NIZORAL) 2 % external shampoo     losartan (COZAAR) 100 MG tablet     atenolol (TENORMIN) 25 MG tablet     estrogen conj-synthetic B (ENJUVIA) 0.625 MG tablet     methotrexate 2.5 MG tablet     progesterone (PROMETRIUM) 100 MG capsule     semaglutide (OZEMPIC, 1 MG/DOSE,) 2 MG/1.5ML pen     Vitamin D3 (CHOLECALCIFEROL) 25 mcg (1000 units) tablet     Current Facility-Administered Medications   Medication     hylan (SYNVISC ONE) injection 48 mg     lidocaine 1 % injection 3 mL     lidocaine 1 % injection 4 mL     triamcinolone acetonide (KENALOG-40) injection 40 mg       Social History     Tobacco Use     Smoking status: Never Smoker     Smokeless tobacco: Never Used   Substance Use Topics     Alcohol use: Yes      Alcohol/week: 1.0 standard drinks     Types: 1 Glasses of wine per week     Comment: socially     Drug use: No       Health Maintenance Due   Topic Date Due     PREVENTIVE CARE VISIT  Never done     ADVANCE CARE PLANNING  Never done     MENTAL HEALTH TX PLAN  Never done     COLORECTAL CANCER SCREENING  Never done     HIV SCREENING  Never done     HEPATITIS C SCREENING  Never done     ZOSTER IMMUNIZATION (1 of 2) Never done     PAP  06/01/2021       No results found for: PAP      July 12, 2021 2:51 PM

## 2021-07-13 LAB — TSH SERPL DL<=0.005 MIU/L-ACNC: 3.15 MU/L (ref 0.4–4)

## 2021-07-15 LAB
ENA SS-B IGG SER IA-ACNC: <0.6 U/ML
ENA SS-B IGG SER IA-ACNC: NEGATIVE

## 2021-07-21 LAB
ENA SS-A AB SER IA-ACNC: <0.5 U/ML
ENA SS-A AB SER IA-ACNC: NEGATIVE

## 2021-08-02 ENCOUNTER — TRANSFERRED RECORDS (OUTPATIENT)
Dept: HEALTH INFORMATION MANAGEMENT | Facility: CLINIC | Age: 56
End: 2021-08-02

## 2021-08-29 ENCOUNTER — TELEPHONE (OUTPATIENT)
Dept: ENDOCRINOLOGY | Facility: CLINIC | Age: 56
End: 2021-08-29

## 2021-09-08 DIAGNOSIS — I10 BENIGN ESSENTIAL HYPERTENSION: ICD-10-CM

## 2021-09-08 NOTE — TELEPHONE ENCOUNTER
Amlodipine: Last time prescribed: 6/28/2021 , 90 tabs x 0 refills  Losartan : Last time prescribed: 6/28/2021 , 90 tabs x 0 refills    Last office visit: 7/12/2021  Next appointment: No future appointments

## 2021-09-09 DIAGNOSIS — L40.50 PSORIATIC ARTHRITIS (H): ICD-10-CM

## 2021-09-09 RX ORDER — AMLODIPINE BESYLATE 5 MG/1
5 TABLET ORAL DAILY
Qty: 90 TABLET | Refills: 0 | Status: SHIPPED | OUTPATIENT
Start: 2021-09-09 | End: 2021-11-29

## 2021-09-09 RX ORDER — LOSARTAN POTASSIUM 100 MG/1
TABLET ORAL
Qty: 90 TABLET | Refills: 0 | Status: SHIPPED | OUTPATIENT
Start: 2021-09-09 | End: 2021-11-29

## 2021-09-09 NOTE — TELEPHONE ENCOUNTER
Losartan (Cozaar) 100mg and Amlodipine (Norvasc) 5mg    Last Office Visit: 7/12/21  [facility] Appointments: None  Medication last refilled: 6/28/21 #90 with 0 refill(s)    Vital Signs 5/4/2021 6/1/21 7/12/21   Systolic 140 143 132   Diastolic 85 86 85     Required labs per protocol:     Ref Range & Units 4/3/19 11/13/20   Potassium 3.4 - 5.3 mmol/L 4.0 3.9   Creatinine 0.6 - 1.3 mg/dL 0.67 0.6     Prescription approved per [facility] Refill Protocol.    Maggie Stevenson, RN, BSN

## 2021-09-13 RX ORDER — METHOTREXATE 25 MG/ML
10 INJECTION, SOLUTION INTRA-ARTERIAL; INTRAMUSCULAR; INTRAVENOUS
Qty: 4 ML | Refills: 3 | Status: SHIPPED | OUTPATIENT
Start: 2021-09-13

## 2021-09-13 NOTE — TELEPHONE ENCOUNTER
methotrexate 50 MG/2ML injection  Last Written Prescription Date:  7/12/2021  Last Fill Quantity: 4,   # refills: 2  Last Office Visit: 6/1/2021  Future Office visit:  None    CBC RESULTS: Recent Labs   Lab Test 04/03/19  0954 04/22/18  0513   WBC  --  5.5   RBC  --  3.29*   HGB 14.4 11.6*   HCT 42.3 33.5*   .5* 102*   MCH 34.2 35.3*   MCHC 34.0 34.6   RDW 11.9 12.3   PLT  --  139*       Creatinine   Date Value Ref Range Status   11/13/2020 0.6 0.6 - 1.3 mg/dL Final   ]    Liver Function Studies -   Recent Labs   Lab Test 04/21/18  0555   PROTTOTAL 6.0   ALBUMIN 3.2*   BILITOTAL 0.4   ALKPHOS 58   AST 32   ALT 32       Routing refill request to provider for review/approval because:  Drug not on the [facility] or [facility] refill protocol or controlled substance    Angelica Negro RN  Central Triage Red Flags/Med Refills

## 2021-09-15 DIAGNOSIS — L40.50 PSORIATIC ARTHRITIS (H): Primary | ICD-10-CM

## 2021-09-15 RX ORDER — NEEDLES, DISPOSABLE 25GX5/8"
NEEDLE, DISPOSABLE MISCELLANEOUS
Qty: 12 EACH | Refills: 3 | Status: CANCELLED | OUTPATIENT
Start: 2021-09-15

## 2021-09-15 NOTE — TELEPHONE ENCOUNTER
Incoming fax received from [facility] requesting a script for syringes and/or needles to use with methotrexate. Rx pended.    Keren Ruano CMA   9/15/2021 10:41 AM

## 2021-09-22 DIAGNOSIS — L40.50 PSORIATIC ARTHRITIS (H): ICD-10-CM

## 2021-10-10 ENCOUNTER — HEALTH MAINTENANCE LETTER (OUTPATIENT)
Age: 56
End: 2021-10-10

## 2021-11-02 ENCOUNTER — TRANSFERRED RECORDS (OUTPATIENT)
Dept: HEALTH INFORMATION MANAGEMENT | Facility: CLINIC | Age: 56
End: 2021-11-02
Payer: COMMERCIAL

## 2021-11-04 ENCOUNTER — TRANSFERRED RECORDS (OUTPATIENT)
Dept: HEALTH INFORMATION MANAGEMENT | Facility: CLINIC | Age: 56
End: 2021-11-04
Payer: COMMERCIAL

## 2021-11-29 DIAGNOSIS — I10 BENIGN ESSENTIAL HYPERTENSION: ICD-10-CM

## 2021-11-29 RX ORDER — LOSARTAN POTASSIUM 100 MG/1
TABLET ORAL
Qty: 30 TABLET | Refills: 0 | Status: SHIPPED | OUTPATIENT
Start: 2021-11-29

## 2021-11-29 RX ORDER — AMLODIPINE BESYLATE 5 MG/1
5 TABLET ORAL DAILY
Qty: 30 TABLET | Refills: 0 | Status: SHIPPED | OUTPATIENT
Start: 2021-11-29

## 2021-11-29 NOTE — TELEPHONE ENCOUNTER
Losartan Last time prescribed: 9/9/21 , 90 tabs/caps x 0 refills  Amlodipine Last time prescribed: 9/9/21 , 90 tabs/caps x 0 refills  Last office visit: 7/12/21  Next appointment: No Future Appointment Scheduled    Medication is being filled for 1 time refill only due to:  Patient needs to be seen because due for follow up and blood pressure management.  staff to contact pt for scheduling in-clinic visit.   Caitlin Brumfield MS RN-BC  11/29/21  2:17 PM

## 2021-11-30 ENCOUNTER — TELEPHONE (OUTPATIENT)
Dept: FAMILY MEDICINE | Facility: CLINIC | Age: 56
End: 2021-11-30
Payer: COMMERCIAL

## 2021-11-30 NOTE — TELEPHONE ENCOUNTER
----- Message from Caitlin Brumfield RN sent at 11/29/2021  2:10 PM CST -----  Regarding: Appointment due  Scheduling -   Please call patient and assist with scheduling an appointment  Reason for appointment: follow up; blood pressure management  Provider: Dr. Grider  Due: December  Appointment type: in-clinic only    Thanks  Caitlin

## 2022-02-01 ENCOUNTER — TRANSFERRED RECORDS (OUTPATIENT)
Dept: HEALTH INFORMATION MANAGEMENT | Facility: CLINIC | Age: 57
End: 2022-02-01
Payer: COMMERCIAL

## 2022-05-21 ENCOUNTER — HEALTH MAINTENANCE LETTER (OUTPATIENT)
Age: 57
End: 2022-05-21

## 2022-05-23 ENCOUNTER — TRANSFERRED RECORDS (OUTPATIENT)
Dept: HEALTH INFORMATION MANAGEMENT | Facility: CLINIC | Age: 57
End: 2022-05-23
Payer: COMMERCIAL

## 2022-06-01 DIAGNOSIS — M17.11 OSTEOARTHRITIS OF RIGHT KNEE, UNSPECIFIED OSTEOARTHRITIS TYPE: Primary | ICD-10-CM

## 2022-06-01 RX ORDER — METHYLPREDNISOLONE 4 MG
4 TABLET, DOSE PACK ORAL
Qty: 21 TABLET | Refills: 0 | COMMUNITY
Start: 2022-06-01

## 2022-09-15 DIAGNOSIS — I10 BENIGN ESSENTIAL HYPERTENSION: ICD-10-CM

## 2022-09-17 RX ORDER — LOSARTAN POTASSIUM 100 MG/1
TABLET ORAL
Qty: 30 TABLET | Refills: 0 | OUTPATIENT
Start: 2022-09-17

## 2022-09-17 NOTE — TELEPHONE ENCOUNTER
No refill. Was told to schedule visit 11/2021 and 30 d supply was given.  >1 yr since last seen. Needs clinic appt to discuss medications. Looks like she has been going to [place] for care

## 2022-09-18 ENCOUNTER — HEALTH MAINTENANCE LETTER (OUTPATIENT)
Age: 57
End: 2022-09-18

## 2022-11-22 ENCOUNTER — TRANSFERRED RECORDS (OUTPATIENT)
Dept: HEALTH INFORMATION MANAGEMENT | Facility: CLINIC | Age: 57
End: 2022-11-22

## 2022-12-12 NOTE — LETTER
2/16/2019       RE: Jocelin Hernandez  [address] [id number]     Dear Colleague,    Thank you for referring your patient, Jocelin Hernandez, to the [facility] SPORTS AND ORTHOPAEDIC WALK IN CLINIC at [facility]. Please see a copy of my visit note below.          SPORTS & ORTHOPEDIC WALK-IN FOLLOW-UP VISIT 2/16/2019    Interval History:     Follow up reason: follow up of left knee     Date of injury: NA    Date last seen: 12/13/18    Following Therapeutic Plan: Yes     Pain: Worsening    Function: Worsening    Interval History: Stairs, sitting to standing she notes locking      Medical History:    Any recent changes to your medical history? No    Any new medication prescribed since last visit? No    Review of Systems:    Do you have fever, chills, weight loss? No    Do you have any vision problems? No    Do you have any chest pain or edema? No    Do you have any shortness of breath or wheezing?  No    Do you have stomach problems? No    Do you have any numbness or focal weakness? No    Do you have diabetes? No    Do you have problems with bleeding or clotting? No    Do you have an rashes or other skin lesions? No             ESTABLISHED PATIENT FOLLOW-UP:  RECHECK (Follow up on left knee )       HISTORY OF PRESENT ILLNESS  Ms. Hernandez is a pleasant 54 year old year old female who presents to clinic today for follow-up of left knee pain    Date of injury: 5 months+  Date last seen: 12/09/18  Following Therapeutic Plan: yes  Pain: Persistent  Function: Interval decline in function, knee now locking.  Interval History: Patient states knee is locking, catching now. Typically occurs after standing from prolonged sitting.   Takes 10-15 steps to unlock.  Makes it clear that it is not just stiff.  Pain persists medial knee. No improvement from synvisc, corticosteroid injection.  brace obtained but does not help either.  Patient notes she is very immobile and  "cannot perform IADLs.    Additional medical/Social/Surgical histories reviewed in [facility] and updated as appropriate.    REVIEW OF SYSTEMS (2/17/2019)  CONSTITUTIONAL: Denies fever and weight loss  GASTROINTESTINAL: Denies abdominal pain, nausea, vomiting  MUSCULOSKELETAL: See HPI  SKIN: Denies any recent rash or lesion  NEUROLOGICAL: Denies numbness or focal weakness     PHYSICAL EXAM  Resp 16   Ht 1.651 m (5' 5\")   Wt 80.3 kg (177 lb)   BMI 29.45 kg/m         General  - normal appearance, in no obvious distress  CV  - normal popliteal pulse  Pulm  - normal respiratory pattern, non-labored  Musculoskeletal - knee  - stance: antalgic gait favoring the left lower extremity, obvious leg length discrepancy  - inspection: normal bone and joint alignment, no obvious deformity. Mild generalized swelling, mild left knee effusion  - palpation: no joint line tenderness, patella and patellar tendon non-tender, lateral and medial joint line tenderness, fullness at the lateral aspect of the popliteal fossa with tenderness  - ROM: Pain 120 degrees flexion, 0 degrees extension,   - strength: 5/5 in flexion, 5/5 in extension  - special tests:  (-) Lachman  (-) anterior drawer  (-) posterior drawer  (-) Roldan  (-) varus at 0 and 30 degrees flexion  (-) valgus at 0 and 30 degrees flexion  Neuro  - no sensory or motor deficit, grossly normal coordination, normal muscle tone  Skin  - no ecchymosis, erythema, warmth, or induration, no obvious rash  Psych  - interactive, appropriate, normal mood and affect    ASSESSMENT & PLAN  Ms. Hernandez is a 54 year old year old female who presents to clinic today to discuss her ongoing left knee pain and lack of improvement with interventions to date.  Now experiencing locking/catching symptoms on a daily basis.  MRI without clear meniscal tear or loose body. At this point we discussed remaining options which are likely surgical.   I will reach out to one of our sports/cartilage restoration " surgeons to discuss any minimally invasive options, but otherwise she may need one of our knee orthopedic surgeons to discuss remaining options.    -Continue  bracing  -Analgesics  -Activity modifications discussed  -Ice therapy  -Follow up PRN    It was a pleasure seeing Jocelin.    Again, thank you for allowing me to participate in the care of your patient.      Sincerely,    Singh Jones, DO       Azelaic Acid Pregnancy And Lactation Text: This medication is considered safe during pregnancy and breast feeding.

## 2023-05-17 ENCOUNTER — LAB REQUISITION (OUTPATIENT)
Dept: LAB | Facility: CLINIC | Age: 58
End: 2023-05-17
Payer: COMMERCIAL

## 2023-05-17 DIAGNOSIS — Z12.4 ENCOUNTER FOR SCREENING FOR MALIGNANT NEOPLASM OF CERVIX: ICD-10-CM

## 2023-05-17 PROCEDURE — 87624 HPV HI-RISK TYP POOLED RSLT: CPT | Mod: ORL | Performed by: OBSTETRICS & GYNECOLOGY

## 2023-05-17 PROCEDURE — G0145 SCR C/V CYTO,THINLAYER,RESCR: HCPCS | Mod: ORL | Performed by: OBSTETRICS & GYNECOLOGY

## 2023-05-19 LAB
BKR LAB AP GYN ADEQUACY: NORMAL
BKR LAB AP GYN INTERPRETATION: NORMAL
BKR LAB AP HPV REFLEX: NORMAL
BKR LAB AP LMP: NORMAL
BKR LAB AP PREVIOUS ABNL DX: NORMAL
BKR LAB AP PREVIOUS ABNORMAL: NORMAL
PATH REPORT.COMMENTS IMP SPEC: NORMAL
PATH REPORT.COMMENTS IMP SPEC: NORMAL
PATH REPORT.RELEVANT HX SPEC: NORMAL

## 2023-05-22 LAB
HUMAN PAPILLOMA VIRUS 16 DNA: NEGATIVE
HUMAN PAPILLOMA VIRUS 18 DNA: NEGATIVE
HUMAN PAPILLOMA VIRUS FINAL DIAGNOSIS: NORMAL
HUMAN PAPILLOMA VIRUS OTHER HR: NEGATIVE

## 2023-07-30 ENCOUNTER — HEALTH MAINTENANCE LETTER (OUTPATIENT)
Age: 58
End: 2023-07-30

## 2024-02-01 ENCOUNTER — TRANSFERRED RECORDS (OUTPATIENT)
Dept: HEALTH INFORMATION MANAGEMENT | Facility: CLINIC | Age: 59
End: 2024-02-01
Payer: COMMERCIAL

## 2024-02-07 ENCOUNTER — TRANSFERRED RECORDS (OUTPATIENT)
Dept: HEALTH INFORMATION MANAGEMENT | Facility: CLINIC | Age: 59
End: 2024-02-07
Payer: COMMERCIAL

## 2024-04-03 ENCOUNTER — TRANSFERRED RECORDS (OUTPATIENT)
Dept: HEALTH INFORMATION MANAGEMENT | Facility: CLINIC | Age: 59
End: 2024-04-03
Payer: COMMERCIAL

## 2024-04-15 ENCOUNTER — TRANSFERRED RECORDS (OUTPATIENT)
Dept: HEALTH INFORMATION MANAGEMENT | Facility: CLINIC | Age: 59
End: 2024-04-15
Payer: COMMERCIAL

## 2024-04-30 ENCOUNTER — TRANSFERRED RECORDS (OUTPATIENT)
Dept: HEALTH INFORMATION MANAGEMENT | Facility: CLINIC | Age: 59
End: 2024-04-30
Payer: COMMERCIAL

## 2024-05-07 ENCOUNTER — TRANSFERRED RECORDS (OUTPATIENT)
Dept: HEALTH INFORMATION MANAGEMENT | Facility: CLINIC | Age: 59
End: 2024-05-07
Payer: COMMERCIAL

## 2024-07-02 ENCOUNTER — LAB REQUISITION (OUTPATIENT)
Dept: LAB | Facility: CLINIC | Age: 59
End: 2024-07-02
Payer: COMMERCIAL

## 2024-07-02 DIAGNOSIS — Z13.220 ENCOUNTER FOR SCREENING FOR LIPOID DISORDERS: ICD-10-CM

## 2024-07-02 DIAGNOSIS — E83.51 HYPOCALCEMIA: ICD-10-CM

## 2024-07-02 DIAGNOSIS — Z13.1 ENCOUNTER FOR SCREENING FOR DIABETES MELLITUS: ICD-10-CM

## 2024-07-02 DIAGNOSIS — Z12.4 ENCOUNTER FOR SCREENING FOR MALIGNANT NEOPLASM OF CERVIX: ICD-10-CM

## 2024-07-02 DIAGNOSIS — R30.0 DYSURIA: ICD-10-CM

## 2024-07-02 LAB
ANION GAP SERPL CALCULATED.3IONS-SCNC: 8 MMOL/L (ref 7–15)
BUN SERPL-MCNC: 16.7 MG/DL (ref 8–23)
CALCIUM SERPL-MCNC: 9.1 MG/DL (ref 8.6–10)
CHLORIDE SERPL-SCNC: 104 MMOL/L (ref 98–107)
CHOLEST SERPL-MCNC: 188 MG/DL
CREAT SERPL-MCNC: 0.72 MG/DL (ref 0.51–0.95)
DEPRECATED HCO3 PLAS-SCNC: 28 MMOL/L (ref 22–29)
EGFRCR SERPLBLD CKD-EPI 2021: >90 ML/MIN/1.73M2
FASTING STATUS PATIENT QL REPORTED: NO
FASTING STATUS PATIENT QL REPORTED: NO
GLUCOSE SERPL-MCNC: 105 MG/DL (ref 70–99)
HBA1C MFR BLD: 5 %
HDLC SERPL-MCNC: 40 MG/DL
LDLC SERPL CALC-MCNC: 114 MG/DL
NONHDLC SERPL-MCNC: 148 MG/DL
POTASSIUM SERPL-SCNC: 4 MMOL/L (ref 3.4–5.3)
SODIUM SERPL-SCNC: 140 MMOL/L (ref 135–145)
TRIGL SERPL-MCNC: 168 MG/DL

## 2024-07-02 PROCEDURE — 87186 SC STD MICRODIL/AGAR DIL: CPT | Mod: ORL | Performed by: OBSTETRICS & GYNECOLOGY

## 2024-07-02 PROCEDURE — 87624 HPV HI-RISK TYP POOLED RSLT: CPT | Mod: ORL | Performed by: OBSTETRICS & GYNECOLOGY

## 2024-07-02 PROCEDURE — 80061 LIPID PANEL: CPT | Mod: ORL | Performed by: OBSTETRICS & GYNECOLOGY

## 2024-07-02 PROCEDURE — G0145 SCR C/V CYTO,THINLAYER,RESCR: HCPCS | Mod: ORL | Performed by: OBSTETRICS & GYNECOLOGY

## 2024-07-02 PROCEDURE — 80048 BASIC METABOLIC PNL TOTAL CA: CPT | Mod: ORL | Performed by: OBSTETRICS & GYNECOLOGY

## 2024-07-02 PROCEDURE — 83036 HEMOGLOBIN GLYCOSYLATED A1C: CPT | Mod: ORL | Performed by: OBSTETRICS & GYNECOLOGY

## 2024-07-02 PROCEDURE — 87086 URINE CULTURE/COLONY COUNT: CPT | Mod: ORL | Performed by: OBSTETRICS & GYNECOLOGY

## 2024-07-03 LAB
HPV HR 12 DNA CVX QL NAA+PROBE: NEGATIVE
HPV16 DNA CVX QL NAA+PROBE: NEGATIVE
HPV18 DNA CVX QL NAA+PROBE: NEGATIVE
HUMAN PAPILLOMA VIRUS FINAL DIAGNOSIS: NORMAL

## 2024-07-04 LAB — BACTERIA UR CULT: ABNORMAL

## 2024-07-09 ENCOUNTER — TRANSFERRED RECORDS (OUTPATIENT)
Dept: HEALTH INFORMATION MANAGEMENT | Facility: CLINIC | Age: 59
End: 2024-07-09
Payer: COMMERCIAL

## 2024-07-09 LAB
BKR LAB AP GYN ADEQUACY: NORMAL
BKR LAB AP GYN INTERPRETATION: NORMAL
PATH REPORT.COMMENTS IMP SPEC: NORMAL
PATH REPORT.COMMENTS IMP SPEC: NORMAL

## 2024-07-14 ENCOUNTER — HEALTH MAINTENANCE LETTER (OUTPATIENT)
Age: 59
End: 2024-07-14

## 2024-07-24 ENCOUNTER — LAB REQUISITION (OUTPATIENT)
Dept: LAB | Facility: CLINIC | Age: 59
End: 2024-07-24
Payer: COMMERCIAL

## 2024-07-24 DIAGNOSIS — N39.0 URINARY TRACT INFECTION, SITE NOT SPECIFIED: ICD-10-CM

## 2024-07-24 PROCEDURE — 87086 URINE CULTURE/COLONY COUNT: CPT | Mod: ORL | Performed by: OBSTETRICS & GYNECOLOGY

## 2024-07-25 LAB — BACTERIA UR CULT: NORMAL

## 2024-12-19 ENCOUNTER — PATIENT OUTREACH (OUTPATIENT)
Dept: CARE COORDINATION | Facility: CLINIC | Age: 59
End: 2024-12-19
Payer: COMMERCIAL

## 2025-07-10 ENCOUNTER — TRANSFERRED RECORDS (OUTPATIENT)
Dept: HEALTH INFORMATION MANAGEMENT | Facility: CLINIC | Age: 60
End: 2025-07-10
Payer: COMMERCIAL

## (undated) RX ORDER — LIDOCAINE HYDROCHLORIDE 10 MG/ML
INJECTION, SOLUTION INFILTRATION; PERINEURAL
Status: DISPENSED
Start: 2018-11-02

## (undated) RX ORDER — LIDOCAINE HYDROCHLORIDE 10 MG/ML
INJECTION, SOLUTION EPIDURAL; INFILTRATION; INTRACAUDAL; PERINEURAL
Status: DISPENSED
Start: 2018-05-25

## (undated) RX ORDER — TRIAMCINOLONE ACETONIDE 40 MG/ML
INJECTION, SUSPENSION INTRA-ARTICULAR; INTRAMUSCULAR
Status: DISPENSED
Start: 2018-05-25

## (undated) RX ORDER — TRIAMCINOLONE ACETONIDE 40 MG/ML
INJECTION, SUSPENSION INTRA-ARTICULAR; INTRAMUSCULAR
Status: DISPENSED
Start: 2018-11-02

## (undated) RX ORDER — LIDOCAINE HYDROCHLORIDE 10 MG/ML
INJECTION, SOLUTION EPIDURAL; INFILTRATION; INTRACAUDAL; PERINEURAL
Status: DISPENSED
Start: 2018-12-13